# Patient Record
Sex: FEMALE | Race: WHITE | NOT HISPANIC OR LATINO | Employment: FULL TIME | URBAN - METROPOLITAN AREA
[De-identification: names, ages, dates, MRNs, and addresses within clinical notes are randomized per-mention and may not be internally consistent; named-entity substitution may affect disease eponyms.]

---

## 2017-07-20 ENCOUNTER — RADIANT APPOINTMENT (OUTPATIENT)
Dept: MAMMOGRAPHY | Facility: CLINIC | Age: 52
End: 2017-07-20
Attending: PHYSICIAN ASSISTANT
Payer: COMMERCIAL

## 2017-07-20 DIAGNOSIS — Z12.31 VISIT FOR SCREENING MAMMOGRAM: ICD-10-CM

## 2017-07-20 PROCEDURE — 77063 BREAST TOMOSYNTHESIS BI: CPT | Mod: TC

## 2017-07-20 PROCEDURE — G0202 SCR MAMMO BI INCL CAD: HCPCS | Mod: TC

## 2017-08-04 ENCOUNTER — HOSPITAL ENCOUNTER (OUTPATIENT)
Facility: CLINIC | Age: 52
Setting detail: SPECIMEN
Discharge: HOME | End: 2017-08-04
Attending: PHYSICIAN ASSISTANT | Admitting: PHYSICIAN ASSISTANT
Payer: COMMERCIAL

## 2017-08-04 ENCOUNTER — OFFICE VISIT (OUTPATIENT)
Dept: FAMILY MEDICINE | Facility: CLINIC | Age: 52
End: 2017-08-04
Payer: COMMERCIAL

## 2017-08-04 VITALS
WEIGHT: 216.4 LBS | HEIGHT: 63 IN | DIASTOLIC BLOOD PRESSURE: 74 MMHG | SYSTOLIC BLOOD PRESSURE: 109 MMHG | BODY MASS INDEX: 38.34 KG/M2 | TEMPERATURE: 99 F | OXYGEN SATURATION: 98 % | HEART RATE: 93 BPM

## 2017-08-04 DIAGNOSIS — Z11.51 SCREENING FOR HUMAN PAPILLOMAVIRUS: ICD-10-CM

## 2017-08-04 DIAGNOSIS — Z00.00 ENCOUNTER FOR ROUTINE ADULT HEALTH EXAMINATION WITHOUT ABNORMAL FINDINGS: Primary | ICD-10-CM

## 2017-08-04 DIAGNOSIS — Z83.49 FAMILY HISTORY OF HYPOTHYROIDISM: ICD-10-CM

## 2017-08-04 DIAGNOSIS — L29.9 ITCHING: ICD-10-CM

## 2017-08-04 DIAGNOSIS — Z11.59 ENCOUNTER FOR HEPATITIS C SCREENING TEST FOR LOW RISK PATIENT: ICD-10-CM

## 2017-08-04 DIAGNOSIS — Z12.4 SCREENING FOR MALIGNANT NEOPLASM OF CERVIX: ICD-10-CM

## 2017-08-04 LAB
ALBUMIN SERPL-MCNC: 4.1 G/DL (ref 3.4–5)
ALP SERPL-CCNC: 56 U/L (ref 40–150)
ALT SERPL W P-5'-P-CCNC: 31 U/L (ref 0–50)
ANION GAP SERPL CALCULATED.3IONS-SCNC: 10 MMOL/L (ref 3–14)
AST SERPL W P-5'-P-CCNC: 22 U/L (ref 0–45)
BASOPHILS # BLD AUTO: 0.1 10E9/L (ref 0–0.2)
BASOPHILS NFR BLD AUTO: 1.1 %
BILIRUB SERPL-MCNC: 0.3 MG/DL (ref 0.2–1.3)
BUN SERPL-MCNC: 23 MG/DL (ref 7–30)
CALCIUM SERPL-MCNC: 9.3 MG/DL (ref 8.5–10.1)
CHLORIDE SERPL-SCNC: 104 MMOL/L (ref 94–109)
CO2 SERPL-SCNC: 25 MMOL/L (ref 20–32)
CREAT SERPL-MCNC: 1 MG/DL (ref 0.52–1.04)
DIFFERENTIAL METHOD BLD: NORMAL
EOSINOPHIL # BLD AUTO: 0.2 10E9/L (ref 0–0.7)
EOSINOPHIL NFR BLD AUTO: 2.6 %
ERYTHROCYTE [DISTWIDTH] IN BLOOD BY AUTOMATED COUNT: 13.1 % (ref 10–15)
GFR SERPL CREATININE-BSD FRML MDRD: 58 ML/MIN/1.7M2
GLUCOSE SERPL-MCNC: 94 MG/DL (ref 70–99)
HCT VFR BLD AUTO: 42.7 % (ref 35–47)
HGB BLD-MCNC: 14.3 G/DL (ref 11.7–15.7)
LIPASE SERPL-CCNC: 225 U/L (ref 73–393)
LYMPHOCYTES # BLD AUTO: 2.5 10E9/L (ref 0.8–5.3)
LYMPHOCYTES NFR BLD AUTO: 30.9 %
MCH RBC QN AUTO: 31.3 PG (ref 26.5–33)
MCHC RBC AUTO-ENTMCNC: 33.5 G/DL (ref 31.5–36.5)
MCV RBC AUTO: 93 FL (ref 78–100)
MONOCYTES # BLD AUTO: 0.7 10E9/L (ref 0–1.3)
MONOCYTES NFR BLD AUTO: 9.1 %
NEUTROPHILS # BLD AUTO: 4.5 10E9/L (ref 1.6–8.3)
NEUTROPHILS NFR BLD AUTO: 56.3 %
PLATELET # BLD AUTO: 267 10E9/L (ref 150–450)
POTASSIUM SERPL-SCNC: 3.9 MMOL/L (ref 3.4–5.3)
PROT SERPL-MCNC: 7.8 G/DL (ref 6.8–8.8)
RBC # BLD AUTO: 4.57 10E12/L (ref 3.8–5.2)
SODIUM SERPL-SCNC: 139 MMOL/L (ref 133–144)
TSH SERPL DL<=0.005 MIU/L-ACNC: 2.07 MU/L (ref 0.4–4)
WBC # BLD AUTO: 7.9 10E9/L (ref 4–11)

## 2017-08-04 PROCEDURE — 87624 HPV HI-RISK TYP POOLED RSLT: CPT | Performed by: PHYSICIAN ASSISTANT

## 2017-08-04 PROCEDURE — G0145 SCR C/V CYTO,THINLAYER,RESCR: HCPCS | Performed by: PHYSICIAN ASSISTANT

## 2017-08-04 PROCEDURE — 99396 PREV VISIT EST AGE 40-64: CPT | Performed by: PHYSICIAN ASSISTANT

## 2017-08-04 PROCEDURE — 83690 ASSAY OF LIPASE: CPT | Performed by: PHYSICIAN ASSISTANT

## 2017-08-04 PROCEDURE — 99212 OFFICE O/P EST SF 10 MIN: CPT | Mod: 25 | Performed by: PHYSICIAN ASSISTANT

## 2017-08-04 PROCEDURE — 86803 HEPATITIS C AB TEST: CPT | Performed by: PHYSICIAN ASSISTANT

## 2017-08-04 PROCEDURE — 80050 GENERAL HEALTH PANEL: CPT | Performed by: PHYSICIAN ASSISTANT

## 2017-08-04 PROCEDURE — 82785 ASSAY OF IGE: CPT | Performed by: PHYSICIAN ASSISTANT

## 2017-08-04 PROCEDURE — 36415 COLL VENOUS BLD VENIPUNCTURE: CPT | Performed by: PHYSICIAN ASSISTANT

## 2017-08-04 NOTE — MR AVS SNAPSHOT
After Visit Summary   8/4/2017    Paulette Flores    MRN: [id number]           Patient Information     Date Of Birth          1965        Visit Information        Provider Department      8/4/2017 12:00 PM Laura Sarah PA-C [facility]        Today's Diagnoses     Screening for malignant neoplasm of cervix    -  1    Screening for human papillomavirus        Encounter for hepatitis C screening test for low risk patient        Family history of hypothyroidism        Itching          Care Instructions      Preventive Health Recommendations  Female Ages 50 - 64    Yearly exam: See your health care provider every year in order to  o Review health changes.   o Discuss preventive care.    o Review your medicines if your doctor has prescribed any.      Get a Pap test every three years (unless you have an abnormal result and your provider advises testing more often).    If you get Pap tests with HPV test, you only need to test every 5 years, unless you have an abnormal result.     You do not need a Pap test if your uterus was removed (hysterectomy) and you have not had cancer.    You should be tested each year for STDs (sexually transmitted diseases) if you're at risk.     Have a mammogram every 1 to 2 years.    Have a colonoscopy at age 50, or have a yearly FIT test (stool test). These exams screen for colon cancer.      Have a cholesterol test every 5 years, or more often if advised.    Have a diabetes test (fasting glucose) every three years. If you are at risk for diabetes, you should have this test more often.     If you are at risk for osteoporosis (brittle bone disease), think about having a bone density scan (DEXA).    Shots: Get a flu shot each year. Get a tetanus shot every 10 years.    Nutrition:     Eat at least 5 servings of fruits and vegetables each day.    Eat whole-grain bread, whole-wheat pasta and brown rice instead of white grains and rice.    Talk to your provider  "about Calcium and Vitamin D.     Lifestyle    Exercise at least 150 minutes a week (30 minutes a day, 5 days a week). This will help you control your weight and prevent disease.    Limit alcohol to one drink per day.    No smoking.     Wear sunscreen to prevent skin cancer.     See your dentist every six months for an exam and cleaning.    See your eye doctor every 1 to 2 years.            Follow-ups after your visit        Who to contact     Normal or non-critical lab and imaging results will be communicated to you by Roamzt, letter or phone within 4 business days after the clinic has received the results. If you do not hear from us within 7 days, please contact the clinic through Roamzt or phone. If you have a critical or abnormal lab result, we will notify you by phone as soon as possible.  Submit refill requests through uBeam or call your pharmacy and they will forward the refill request to us. Please allow 3 business days for your refill to be completed.          If you need to speak with a  for additional information , please call: [phone]             Additional Information About Your Visit        uBeam Information     uBeam lets you send messages to your doctor, view your test results, renew your prescriptions, schedule appointments and more. To sign up, go to [email or web address] . Click on \"Log in\" on the left side of the screen, which will take you to the Welcome page. Then click on \"Sign up Now\" on the right side of the page.     You will be asked to enter the access code listed below, as well as some personal information. Please follow the directions to create your username and password.     Your access code is: [email or web address]  Expires: 2017 12:28 PM     Your access code will  in 90 days. If you need help or a new code, please call your [place] clinic or [phone].        Care EveryWhere ID     This is your Care EveryWhere ID. This could be used by other " "organizations to access your [place] medical records  [id number]        Your Vitals Were     Pulse Temperature Height Pulse Oximetry BMI (Body Mass Index)       93 99  F (37.2  C) (Oral) 5' 3\" (1.6 m) 98% 38.33 kg/m2        Blood Pressure from Last 3 Encounters:   08/04/17 109/74   09/08/16 (!) 142/92   09/06/16 135/72    Weight from Last 3 Encounters:   08/04/17 216 lb 6.4 oz (98.2 kg)   09/08/16 190 lb (86.2 kg)   09/06/16 190 lb (86.2 kg)              We Performed the Following     CBC with platelets differential     Comprehensive metabolic panel     Hepatitis C Screen Reflex to HCV RNA Quant and Genotype     HPV High Risk Types DNA Cervical     IgE     Lipase     Pap imaged thin layer screen with HPV - recommended age 30 - 65 years (select HPV order below)     TSH with free T4 reflex        Primary Care Provider Office Phone # Fax #    Laura Sarah PA-C [phone]       [facility] [address] [facility] [address]        Equal Access to Services     [facility] : Hadii aad ku hadasho Soomaali, waaxda luqadaha, qaybta kaalmada adenorahyada, erma alvarez . So [facility] [phone].    ATENCIÓN: Si habla español, tiene a garcia disposición servicios gratuitos de asistencia lingüística. [facility] [phone].    We comply with applicable federal civil rights laws and [place] laws. We do not discriminate on the basis of race, color, national origin, age, disability sex, sexual orientation or gender identity.            Thank you!     Thank you for choosing [facility]  for your care. Our goal is always to provide you with excellent care. Hearing back from our patients is one way we can continue to improve our services. Please take a few minutes to complete the written survey that you may receive in the mail after your visit with us. Thank you!             Your Updated Medication List - Protect others around you: Learn how to safely use, store and " throw away your medicines at www.disposemymeds.org.      Notice  As of 8/4/2017 12:28 PM    You have not been prescribed any medications.

## 2017-08-04 NOTE — PROGRESS NOTES
SUBJECTIVE:   CC: Paulette Flores is an 52 year old woman who presents for preventive health visit.     Healthy Habits:    Do you get at least three servings of calcium containing foods daily (dairy, green leafy vegetables, etc.)? yes    Amount of exercise or daily activities, outside of work: daily     Problems taking medications regularly No    Medication side effects: No    Have you had an eye exam in the past two years? yes    Do you see a dentist twice per year? yes    Do you have sleep apnea, excessive snoring or daytime drowsiness?no      Other concerns:  Itching  All over   j2lliok off and on   No rash  Had this previously  Claritin has helped    Patient informed that anything we discuss that is not related to preventative medicine, may be billed for; patient verbalizes understanding.      Abuse: Current or Past(Physical, Sexual or Emotional)- No  Do you feel safe in your environment - Yes  Social History   Substance Use Topics     Smoking status: Current Some Day Smoker     Packs/day: 0.50     Years: 15.00     Types: Cigarettes     Smokeless tobacco: Never Used     Alcohol use No     The patient does not drink >3 drinks per day nor >7 drinks per week.    Reviewed orders with patient.  Reviewed health maintenance and updated orders accordingly - Yes  Labs reviewed in [facility]    Patient over age 50, mutual decision to screen reflected in health maintenance.      Pertinent mammograms are reviewed under the imaging tab.  History of abnormal Pap smear: NO - age 30- 65 PAP every 3 years recommended  Last 3 Pap Results: No results found for: PAP    Reviewed and updated as needed this visit by clinical staff  Allergies  Meds         Reviewed and updated as needed this visit by Provider        No past medical history on file.     ROS:  Other than what is noted in the HPI and PMH a complete review of systems is otherwise negative including: Constitutional, HEENT, endocrine, cardiovascular, respiratory, GI/,  "musculoskeletal, neuro, and psychiatric.     OBJECTIVE:   /74  Pulse 93  Temp 99  F (37.2  C) (Oral)  Ht 5' 3\" (1.6 m)  Wt 216 lb 6.4 oz (98.2 kg)  SpO2 98%  BMI 38.33 kg/m2  EXAM:  GENERAL: healthy, alert and no distress  EYES: Eyes grossly normal to inspection, PERRL and conjunctivae and sclerae normal  HENT: ear canals and TM's normal, nose and mouth without ulcers or lesions  NECK: no adenopathy, no asymmetry, masses, or scars and thyroid normal to palpation  RESP: lungs clear to auscultation - no rales, rhonchi or wheezes  BREAST: normal without masses, tenderness or nipple discharge and no palpable axillary masses or adenopathy  CV: regular rate and rhythm, normal S1 S2, no S3 or S4, no murmur, click or rub, no peripheral edema and peripheral pulses strong  ABDOMEN: soft, nontender, no hepatosplenomegaly, no masses and bowel sounds normal   (female): normal female external genitalia, normal urethral meatus, vaginal mucosa pink, moist, well rugated, and normal cervix  MS: no gross musculoskeletal defects noted, no edema  SKIN: no suspicious lesions or rashes  NEURO: Normal strength and tone, mentation intact and speech normal  PSYCH: mentation appears normal, affect normal/bright    ASSESSMENT/PLAN:       ICD-10-CM    1. Encounter for routine adult health examination without abnormal findings Z00.00    2. Screening for malignant neoplasm of cervix Z12.4 Pap imaged thin layer screen with HPV - recommended age 30 - 65 years (select HPV order below)   3. Screening for human papillomavirus Z11.51 HPV High Risk Types DNA Cervical   4. Encounter for hepatitis C screening test for low risk patient Z11.59 Hepatitis C Screen Reflex to HCV RNA Quant and Genotype   5. Family history of hypothyroidism Z83.49 TSH with free T4 reflex   6. Itching L29.9 CBC with platelets differential     Comprehensive metabolic panel     Lipase     IgE       Pap obtained today. Screening measures discussed.    Will obtain labs " "for her itching. No rash present today. Hepatic issue? Allergy? She will continue the antihistamine for now, could take this twice daily as well.      COUNSELING:   Reviewed preventive health counseling, as reflected in patient instructions       reports that she has been smoking Cigarettes.  She has a 7.50 pack-year smoking history. She has never used smokeless tobacco.    Estimated body mass index is 38.33 kg/(m^2) as calculated from the following:    Height as of this encounter: 5' 3\" (1.6 m).    Weight as of this encounter: 216 lb 6.4 oz (98.2 kg).       Counseling Resources:  ATP IV Guidelines  Pooled Cohorts Equation Calculator  Breast Cancer Risk Calculator  FRAX Risk Assessment  ICSI Preventive Guidelines  Dietary Guidelines for Americans, 2010  USDA's MyPlate  ASA Prophylaxis  Lung CA Screening    Laura Sarah PA-C  [facility] [place]  "

## 2017-08-04 NOTE — NURSING NOTE
"Chief Complaint   Patient presents with     Physical       Initial /74  Pulse 93  Temp 99  F (37.2  C) (Oral)  Ht 5' 3\" (1.6 m)  Wt 216 lb 6.4 oz (98.2 kg)  SpO2 98%  BMI 38.33 kg/m2 Estimated body mass index is 38.33 kg/(m^2) as calculated from the following:    Height as of this encounter: 5' 3\" (1.6 m).    Weight as of this encounter: 216 lb 6.4 oz (98.2 kg).  Medication Reconciliation: complete   Jennifer Carr MA      "

## 2017-08-07 LAB
HCV AB SERPL QL IA: NORMAL
IGE SERPL-ACNC: 6 KIU/L (ref 0–114)

## 2017-08-08 PROBLEM — R94.4 DECREASED GFR: Status: ACTIVE | Noted: 2017-08-08

## 2017-08-09 LAB
COPATH REPORT: NORMAL
PAP: NORMAL

## 2017-08-10 LAB
FINAL DIAGNOSIS: NORMAL
HPV HR 12 DNA CVX QL NAA+PROBE: NEGATIVE
HPV16 DNA SPEC QL NAA+PROBE: NEGATIVE
HPV18 DNA SPEC QL NAA+PROBE: NEGATIVE
SPECIMEN DESCRIPTION: NORMAL

## 2017-08-18 PROBLEM — Z12.4 CERVICAL CANCER SCREENING: Status: ACTIVE | Noted: 2017-08-18

## 2017-11-08 DIAGNOSIS — M25.579 PAIN IN JOINT, ANKLE AND FOOT, UNSPECIFIED LATERALITY: Primary | ICD-10-CM

## 2017-12-30 ENCOUNTER — TRANSFERRED RECORDS (OUTPATIENT)
Dept: HEALTH INFORMATION MANAGEMENT | Facility: CLINIC | Age: 52
End: 2017-12-30

## 2018-01-02 ENCOUNTER — TRANSFERRED RECORDS (OUTPATIENT)
Dept: HEALTH INFORMATION MANAGEMENT | Facility: CLINIC | Age: 53
End: 2018-01-02

## 2018-01-28 ENCOUNTER — OFFICE VISIT (OUTPATIENT)
Dept: URGENT CARE | Facility: URGENT CARE | Age: 53
End: 2018-01-28
Payer: COMMERCIAL

## 2018-01-28 VITALS
SYSTOLIC BLOOD PRESSURE: 127 MMHG | DIASTOLIC BLOOD PRESSURE: 77 MMHG | TEMPERATURE: 99.7 F | OXYGEN SATURATION: 97 % | HEART RATE: 97 BPM | WEIGHT: 217.5 LBS | BODY MASS INDEX: 38.53 KG/M2

## 2018-01-28 DIAGNOSIS — R05.9 COUGH: ICD-10-CM

## 2018-01-28 DIAGNOSIS — J01.90 ACUTE SINUSITIS WITH SYMPTOMS > 10 DAYS: Primary | ICD-10-CM

## 2018-01-28 PROCEDURE — 99214 OFFICE O/P EST MOD 30 MIN: CPT | Performed by: PHYSICIAN ASSISTANT

## 2018-01-28 RX ORDER — BENZONATATE 100 MG/1
100 CAPSULE ORAL 3 TIMES DAILY PRN
Qty: 18 CAPSULE | Refills: 0 | Status: SHIPPED | OUTPATIENT
Start: 2018-01-28 | End: 2018-02-14

## 2018-01-28 RX ORDER — FLUTICASONE PROPIONATE 50 MCG
2 SPRAY, SUSPENSION (ML) NASAL DAILY
Qty: 1 BOTTLE | Refills: 0 | Status: SHIPPED | OUTPATIENT
Start: 2018-01-28 | End: 2018-02-14

## 2018-01-28 RX ORDER — DOXYCYCLINE 100 MG/1
100 TABLET ORAL 2 TIMES DAILY
Qty: 20 TABLET | Refills: 0 | Status: SHIPPED | OUTPATIENT
Start: 2018-01-28 | End: 2018-02-07

## 2018-01-28 NOTE — PROGRESS NOTES
ASSESSMENT/PLAN:    ICD-10-CM    1. Acute sinusitis with symptoms > 10 days J01.90 doxycycline Monohydrate 100 MG TABS     fluticasone (FLONASE) 50 MCG/ACT spray   2. Cough R05 benzonatate (TESSALON) 100 MG capsule           Medical Decision Making:    Differential Diagnosis: recurrent sinusitis, sinusitis failing prior treatment, acute bronchitis, viral URI    Serious Comorbid Conditions: none    Symptoms consistent with bacterial sinusitis failing prior treatment with Augmentin at beginning of January. No signs of pneumonia or bronchitis on lung exam. Highly suspect cough is due to postnasal drip from sinusitis. We will trial treatment with doxycycline today. Also prescribed Flonase and recommended Mucinex BID.    At the end of the encounter, I discussed all available results, as well as the diagnosis and any associated medications. I discussed red flags for immediate return to clinic/ER, as well as indications for follow up. Refer to patient instructions below, which were all addressed with patient. Patient understood and agreed to plan. Patient was appropriate for discharge.      Patient Instructions   Sinusitis  You have a sinus infection.    Take Doxycycline twice daily with food x 10 days.    Take a probiotic while taking the antibiotic.    Take Mucinex twice daily. Use sinus rinses or Neti-pot, steamy showers, humidifiers.    Try Flonase 2 sprays in each nostril nightly at bedtime.    Take ibuprofen or Tylenol for sinus pain.    May take Tessalon Perles every 8 hours for cough.    Follow up with primary care provider if no improvement in 2 weeks, sooner if worsening or developing any new symptoms.                                    SUBJECTIVE:   Paulette Flores is a 52 year old female presenting with a chief complaint of   Chief Complaint   Patient presents with     Sick     cough, sinus pain and pressure x since end of december. was given augmentin January 2nd.      She started having sinus congestion 1  month ago. She was seen at [facility] and was sent home with supportive cares. She had a course of Augmentin beginning Jan 2. Symptoms never completely resolved and she continued to have post-nasal drip.  Then she developed a cough a week ago. She was seen at [facility] and again was prescribed supportive cares.  She describes cough as dry. It is worse when laying down. She does still have sinus pressure and left ear pain. She denies sore throat, fever. She does have chills, fatigue. No shortness of breath, chest pain, hemoptysis.   Many ill contacts with flu, etc.  She has been using Mucinex without any improvement.    ROS  See HPI, otherwise negative    No past medical history on file.  Current Outpatient Prescriptions   Medication Sig Dispense Refill     doxycycline Monohydrate 100 MG TABS Take 100 mg by mouth 2 times daily for 10 days 20 tablet 0     fluticasone (FLONASE) 50 MCG/ACT spray Spray 2 sprays into both nostrils daily 1 Bottle 0     benzonatate (TESSALON) 100 MG capsule Take 1 capsule (100 mg) by mouth 3 times daily as needed for cough 18 capsule 0     Social History   Substance Use Topics     Smoking status: Current Some Day Smoker     Packs/day: 0.50     Years: 15.00     Types: Cigarettes     Smokeless tobacco: Never Used     Alcohol use No        Smoking: yes  Travel: none      OBJECTIVE  /77  Pulse 97  Temp 99.7  F (37.6  C) (Tympanic)  Wt 217 lb 8 oz (98.7 kg)  SpO2 97%  BMI 38.53 kg/m2    General: alert, appears comfortable seated, NAD. Afebrile.  Skin: no suspicious lesions or rashes.  HEENT: Normocephalic.   Eyes: conjunctiva clear.   Ears: TMs pearly, translucent bilaterally.   Nose: No polyps seen. Mild erythema and edema of nasal mucosa. No rhinorrhea. ++ left maxillary sinus TTP.  Oropharynx: MMM.  + posterior pharyngeal erythema. + uvular erythema and 1 papule, without ulceration and no uvular edema. No tonsillar hypertrophy. Uvula midline.    Neck: supple, no  lymphadenopathy.  Respiratory: No distress. Equal inspiration to bilateral bases. No crackles wheeze, rhonchi, rales.   Cardiovascular: RRR. No murmurs, clicks, gallups, or rub.

## 2018-01-28 NOTE — MR AVS SNAPSHOT
After Visit Summary   1/28/2018    Paulette Flores    MRN: [id number]           Patient Information     Date Of Birth          1965        Visit Information        Provider Department      1/28/2018 9:05 AM Arleen Parra PA-C [facility]        Today's Diagnoses     Acute sinusitis with symptoms > 10 days    -  1    Cough          Care Instructions    Sinusitis  You have a sinus infection.    Take Doxycycline twice daily with food x 10 days.    Take a probiotic while taking the antibiotic.    Take Mucinex twice daily. Use sinus rinses or Neti-pot, steamy showers, humidifiers.    Try Flonase 2 sprays in each nostril nightly at bedtime.    Take ibuprofen or Tylenol for sinus pain.    May take Tessalon Perles every 8 hours for cough.    Follow up with primary care provider if no improvement in 2 weeks, sooner if worsening or developing any new symptoms.                Follow-ups after your visit        Follow-up notes from your care team     Return if symptoms worsen or fail to improve.      Who to contact     If you have questions or need follow up information about today's clinic visit or your schedule please contact [facility] directly at [phone].  Normal or non-critical lab and imaging results will be communicated to you by Transceptahart, letter or phone within 4 business days after the clinic has received the results. If you do not hear from us within 7 days, please contact the clinic through Transceptahart or phone. If you have a critical or abnormal lab result, we will notify you by phone as soon as possible.  Submit refill requests through Roomish or call your pharmacy and they will forward the refill request to us. Please allow 3 business days for your refill to be completed.          Additional Information About Your Visit        MyChart Information     Roomish gives you secure access to your electronic health record. If you see a primary care provider, you can  also send messages to your care team and make appointments. If you have questions, please call your primary care clinic.  If you do not have a primary care provider, please call [phone] and they will assist you.        Care EveryWhere ID     This is your Care EveryWhere ID. This could be used by other organizations to access your [place] medical records  [id number]        Your Vitals Were     Pulse Temperature Pulse Oximetry BMI (Body Mass Index)          97 99.7  F (37.6  C) (Tympanic) 97% 38.53 kg/m2         Blood Pressure from Last 3 Encounters:   01/28/18 127/77   08/04/17 109/74   09/08/16 (!) 142/92    Weight from Last 3 Encounters:   01/28/18 217 lb 8 oz (98.7 kg)   08/04/17 216 lb 6.4 oz (98.2 kg)   09/08/16 190 lb (86.2 kg)              Today, you had the following     No orders found for display         Today's Medication Changes          These changes are accurate as of 1/28/18  9:49 AM.  If you have any questions, ask your nurse or doctor.               Start taking these medicines.        Dose/Directions    benzonatate 100 MG capsule   Commonly known as:  TESSALON   Used for:  Cough   Started by:  Arleen Parra PA-C        Dose:  100 mg   Take 1 capsule (100 mg) by mouth 3 times daily as needed for cough   Quantity:  18 capsule   Refills:  0       doxycycline Monohydrate 100 MG Tabs   Used for:  Acute sinusitis with symptoms > 10 days   Started by:  Arleen Parra PA-C        Dose:  100 mg   Take 100 mg by mouth 2 times daily for 10 days   Quantity:  20 tablet   Refills:  0       fluticasone 50 MCG/ACT spray   Commonly known as:  FLONASE   Used for:  Acute sinusitis with symptoms > 10 days   Started by:  Arleen Parra PA-C        Dose:  2 spray   Spray 2 sprays into both nostrils daily   Quantity:  1 Bottle   Refills:  0            Where to get your medicines      These medications were sent to [facility]/pharmacy #[id number] - [place], [place] - [address] [place] [address] AT INTERSECTION  [address]  [address] [place] [address] [place], [place] [address]     Phone:  [phone]     benzonatate 100 MG capsule    doxycycline Monohydrate 100 MG Tabs    fluticasone 50 MCG/ACT spray                Primary Care Provider Office Phone # Fax #    Laura Sarah PA-C [phone]       [address]  [place] [address]        Equal Access to Services     [facility]: Hadii aad ku hadasho Soomaali, waaxda luqadaha, qaybta kaalmada adeegyada, waxay idiin hayaan adeeg kharash la'aan . So [facility] [phone].    ATENCIÓN: Si sharila espjohanne, tiene a garcia disposición servicios gratuitos de asistencia lingüística. [facility] [phone].    We comply with applicable federal civil rights laws and [place] laws. We do not discriminate on the basis of race, color, national origin, age, disability, sex, sexual orientation, or gender identity.            Thank you!     Thank you for [facility]  [facility]. Our goal is always to provide you with excellent care. Hearing back from our patients is one way we can continue to improve our services. Please take a few minutes to complete the written survey that you may receive in the mail after your visit with us. Thank you!             Your Updated Medication List - Protect others around you: Learn how to safely use, store and throw away your medicines at www.disposemymeds.org.          This list is accurate as of 1/28/18  9:49 AM.  Always use your most recent med list.                   Brand Name Dispense Instructions for use Diagnosis    benzonatate 100 MG capsule    TESSALON    18 capsule    Take 1 capsule (100 mg) by mouth 3 times daily as needed for cough    Cough       doxycycline Monohydrate 100 MG Tabs     20 tablet    Take 100 mg by mouth 2 times daily for 10 days    Acute sinusitis with symptoms > 10 days       fluticasone 50 MCG/ACT spray    FLONASE    1 Bottle    Spray 2 sprays into both nostrils daily    Acute sinusitis with  symptoms > 10 days

## 2018-01-28 NOTE — PATIENT INSTRUCTIONS
Sinusitis  You have a sinus infection.    Take Doxycycline twice daily with food x 10 days.    Take a probiotic while taking the antibiotic.    Take Mucinex twice daily. Use sinus rinses or Neti-pot, steamy showers, humidifiers.    Try Flonase 2 sprays in each nostril nightly at bedtime.    Take ibuprofen or Tylenol for sinus pain.    May take Tessalon Perles every 8 hours for cough.    Follow up with primary care provider if no improvement in 2 weeks, sooner if worsening or developing any new symptoms.

## 2018-01-30 ENCOUNTER — TELEPHONE (OUTPATIENT)
Dept: FAMILY MEDICINE | Facility: CLINIC | Age: 53
End: 2018-01-30

## 2018-01-30 NOTE — TELEPHONE ENCOUNTER
Spoke with patient she was started on Doxycyline by  on 1/28/18. (augmentin previously)  Rash started on Monday, redness and a little puffiness in left arm pit, no itch but area is sore. No fever  No change in size of rash with continued use of abx.  She has had 3 episodes of diarrhea today, stool was loose yesterday. Tinny taste in mouth  Does eat yogurt daily.  Patient asking if should stop meds? Try a different med?  She had been given Augmentin previous by [facility],   She is not sure if she should keep using the Flonase either?  Patient not sure what are the next steps she needs to be taking?  Ruth Gong RN

## 2018-01-30 NOTE — TELEPHONE ENCOUNTER
Sxs in her axilla sound more consistent with an early boil or infection. Doesn't sounds like an allergic reaction.  If she can tolerate the doxycycline I'd continue it for at least 7 days. A probiotic may help with GI symptoms. If not, we could switch to Augmentin.  Flonase best for chronic sxs rather than acute symptoms such as sinusitis. May not be of benefit for an acute sinus infection

## 2018-01-30 NOTE — TELEPHONE ENCOUNTER
Patient states she was put on doxycylciine and developed a rash and diarrhea and wonders if it is from that and if she should keep taking it.  Please call.    Thank you.

## 2018-01-30 NOTE — TELEPHONE ENCOUNTER
Patient notified and voiced understanding and agreement.  She will continue the doxycycline for now.  She knows to call if any questions or concerns.  Ruth Gong RN

## 2018-02-06 NOTE — PROGRESS NOTES
"  SUBJECTIVE:  Paulette Flores is a 52 year old female who presents with the following concerns;              Symptoms: cc Present Absent Comment   Fever/Chills   x    Fatigue  x  intermittent   Muscle Aches  x  Chest sore from coughing   Eye Irritation   x    Sneezing   x    Nasal Tony/Drg   x    Sinus Pressure/Pain  x     Loss of smell   x    Dental pain   x    Sore Throat   x    Swollen Glands   x    Ear Pain/Fullness  x  fullness   Cough  x     Wheeze   x    Chest Pain  x  tightness   Shortness of breath   x    Rash   x    Other         Symptom duration:  x2 months   Sympom severity:  moderate   Treatments tried:  Augmentin, benzonatate, mucinex, sudafed, doxycycline    Contacts:  none       Medications updated and reviewed.  Past, family and surgical history is updated and reviewed in the record.    ROS:  Other than noted above, general, HEENT, respiratory, cardiac and gastrointestinal systems are negative.    OBJECTIVE:  /57  Pulse 83  Temp 98.6  F (37  C) (Oral)  Ht 5' 3\" (1.6 m)  Wt 218 lb (98.9 kg)  SpO2 98%  BMI 38.62 kg/m2  GENERAL: Pleasant and interactive. No acute distress.  HEENT: Mild injection of conjunctiva. TMs clear.  Oropharynx moist and clear.   NECK: mild-mod TTP over the left mastoid  SKIN:  Only benign skin findings. No unusual rashes or suspicious skin lesions noted. Nails appear normal.    Assessment:    Encounter Diagnoses   Name Primary?     Chronic sinusitis, unspecified location Yes     Mastoiditis of left side      Plan:   Orders Placed This Encounter     CT Maxillofacial w/o Contrast     levofloxacin (LEVAQUIN) 750 MG tablet       Will start levofloxacin. She has an appt with ENT in 1 week. If still symptomatic by Monday she'll have a follow up sinus CT as well.    Supportive therapy also discussed. Follow up if symptoms fail to improve or worsen.      The patient was in agreement with the plan today and had no questions or concerns prior to leaving the clinic.     Laura " Hermelinda Sarah PA-C

## 2018-02-07 ENCOUNTER — OFFICE VISIT (OUTPATIENT)
Dept: FAMILY MEDICINE | Facility: CLINIC | Age: 53
End: 2018-02-07
Payer: COMMERCIAL

## 2018-02-07 VITALS
WEIGHT: 218 LBS | TEMPERATURE: 98.6 F | HEART RATE: 83 BPM | HEIGHT: 63 IN | DIASTOLIC BLOOD PRESSURE: 57 MMHG | OXYGEN SATURATION: 98 % | SYSTOLIC BLOOD PRESSURE: 116 MMHG | BODY MASS INDEX: 38.62 KG/M2

## 2018-02-07 DIAGNOSIS — H70.92 MASTOIDITIS OF LEFT SIDE: ICD-10-CM

## 2018-02-07 DIAGNOSIS — J32.9 CHRONIC SINUSITIS, UNSPECIFIED LOCATION: Primary | ICD-10-CM

## 2018-02-07 PROCEDURE — 99213 OFFICE O/P EST LOW 20 MIN: CPT | Performed by: PHYSICIAN ASSISTANT

## 2018-02-07 RX ORDER — LEVOFLOXACIN 750 MG/1
750 TABLET, FILM COATED ORAL DAILY
Qty: 10 TABLET | Refills: 0 | Status: SHIPPED | OUTPATIENT
Start: 2018-02-07 | End: 2018-02-17

## 2018-02-07 NOTE — MR AVS SNAPSHOT
After Visit Summary   2/7/2018    Pauletet Flores    MRN: [id number]           Patient Information     Date Of Birth          1965        Visit Information        Provider Department      2/7/2018 8:20 AM Laura Sarah PA-C [facility]        Today's Diagnoses     Chronic sinusitis, unspecified location    -  1       Follow-ups after your visit        Your next 10 appointments already scheduled     Feb 14, 2018  1:00 PM CST   Return Visit with Cynthia [facility]erjee MD   [facility]    [address] [address]   [phone]              Future tests that were ordered for you today     Open Future Orders        Priority Expected Expires Ordered    CT Maxillofacial w/o Contrast Routine 2/12/2018 2/7/2019 2/7/2018            Who to contact     Normal or non-critical lab and imaging results will be communicated to you by [employer], letter or phone within 4 business days after the clinic has received the results. If you do not hear from us within 7 days, please contact the clinic through [employer] or phone. If you have a critical or abnormal lab result, we will notify you by phone as soon as possible.  Submit refill requests through [employer] or call your pharmacy and they will forward the refill request to us. Please allow 3 business days for your refill to be completed.          If you need to speak with a  for additional information , please call: [phone]             Additional Information About Your Visit        [employer] Information     [employer] gives you secure access to your electronic health record. If you see a primary care provider, you can also send messages to your care team and make appointments. If you have questions, please call your primary care clinic.  If you do not have a primary care provider, please call [phone] and they will assist you.        Care EveryWhere ID     This is your Care  "EveryWhere ID. This could be used by other organizations to access your [facility] medical records  [id number]        Your Vitals Were     Pulse Temperature Height Pulse Oximetry BMI (Body Mass Index)       83 98.6  F (37  C) (Oral) 5' 3\" (1.6 m) 98% 38.62 kg/m2        Blood Pressure from Last 3 Encounters:   02/07/18 116/57   01/28/18 127/77   08/04/17 109/74    Weight from Last 3 Encounters:   02/07/18 218 lb (98.9 kg)   01/28/18 217 lb 8 oz (98.7 kg)   08/04/17 216 lb 6.4 oz (98.2 kg)                 Today's Medication Changes          These changes are accurate as of 2/7/18  8:47 AM.  If you have any questions, ask your nurse or doctor.               Start taking these medicines.        Dose/Directions    levofloxacin 750 MG tablet   Commonly known as:  LEVAQUIN   Used for:  Chronic sinusitis, unspecified location   Started by:  Laura Sarah PA-C        Dose:  750 mg   Take 1 tablet (750 mg) by mouth daily for 10 days   Quantity:  10 tablet   Refills:  0            Where to get your medicines      These medications were sent to [facility] [place] [address] [facility] [address] [facility] [place]  [address] [address]     Phone:  [phone]     levofloxacin 750 MG tablet                Primary Care Provider Office Phone # Fax #    Laura Sarah PA-C [phone] [address]  [place] [address]        Equal Access to Services     [facility]: Hadii bessy mccullough hadasho Soshoali, waaxda luqadaha, qaybta kaalmada adeegyada, erma hanna. So [facility] [phone].    ATENCIÓN: Si habla español, tiene a garcia disposición servicios gratuitos de asistencia lingüística. Llame al [phone].    We comply with applicable federal civil rights laws and [place] laws. We do not discriminate on the basis of race, color, national origin, age, disability, sex, sexual orientation, or gender identity.            Thank you!     Thank you for choosing " [facility] [place]  for your care. Our goal is always to provide you with excellent care. Hearing back from our patients is one way we can continue to improve our services. Please take a few minutes to complete the written survey that you may receive in the mail after your visit with us. Thank you!             Your Updated Medication List - Protect others around you: Learn how to safely use, store and throw away your medicines at www.disposemymeds.org.          This list is accurate as of 2/7/18  8:47 AM.  Always use your most recent med list.                   Brand Name Dispense Instructions for use Diagnosis    benzonatate 100 MG capsule    TESSALON    18 capsule    Take 1 capsule (100 mg) by mouth 3 times daily as needed for cough    Cough       doxycycline Monohydrate 100 MG Tabs     20 tablet    Take 100 mg by mouth 2 times daily for 10 days    Acute sinusitis with symptoms > 10 days       fluticasone 50 MCG/ACT spray    FLONASE    1 Bottle    Spray 2 sprays into both nostrils daily    Acute sinusitis with symptoms > 10 days       levofloxacin 750 MG tablet    LEVAQUIN    10 tablet    Take 1 tablet (750 mg) by mouth daily for 10 days    Chronic sinusitis, unspecified location

## 2018-02-12 ENCOUNTER — RADIANT APPOINTMENT (OUTPATIENT)
Dept: CT IMAGING | Facility: CLINIC | Age: 53
End: 2018-02-12
Attending: PHYSICIAN ASSISTANT
Payer: COMMERCIAL

## 2018-02-12 DIAGNOSIS — J32.9 CHRONIC SINUSITIS, UNSPECIFIED LOCATION: ICD-10-CM

## 2018-02-12 PROCEDURE — 70486 CT MAXILLOFACIAL W/O DYE: CPT | Mod: TC

## 2018-02-14 ENCOUNTER — OFFICE VISIT (OUTPATIENT)
Dept: OTOLARYNGOLOGY | Facility: CLINIC | Age: 53
End: 2018-02-14
Payer: COMMERCIAL

## 2018-02-14 VITALS
HEART RATE: 76 BPM | SYSTOLIC BLOOD PRESSURE: 111 MMHG | BODY MASS INDEX: 38.62 KG/M2 | DIASTOLIC BLOOD PRESSURE: 66 MMHG | WEIGHT: 218 LBS | TEMPERATURE: 97.7 F

## 2018-02-14 DIAGNOSIS — J32.0 CHRONIC MAXILLARY SINUSITIS: Primary | ICD-10-CM

## 2018-02-14 DIAGNOSIS — H69.92 DYSFUNCTION OF LEFT EUSTACHIAN TUBE: ICD-10-CM

## 2018-02-14 PROCEDURE — 99213 OFFICE O/P EST LOW 20 MIN: CPT | Performed by: OTOLARYNGOLOGY

## 2018-02-14 ASSESSMENT — PAIN SCALES - GENERAL: PAINLEVEL: NO PAIN (0)

## 2018-02-14 NOTE — NURSING NOTE
"Initial /66 (BP Location: Right arm, Patient Position: Chair, Cuff Size: Adult Large)  Pulse 76  Temp 97.7  F (36.5  C) (Oral)  Wt 98.9 kg (218 lb)  BMI 38.62 kg/m2 Estimated body mass index is 38.62 kg/(m^2) as calculated from the following:    Height as of 2/7/18: 1.6 m (5' 3\").    Weight as of this encounter: 98.9 kg (218 lb). .    Asuncion Bello LPN    "

## 2018-02-14 NOTE — LETTER
2/14/2018         RE: Paulette Flores  [address]  [place] [address]        Dear Colleague,    Thank you for referring your patient, Paulette Flores, to the [facility]. Please see a copy of my visit note below.    History of Present Illness - Paulette Flores is a 52 year old female I have seen in the past in follow-up of her left ear.  She had multiple otologic surgeries on the left by Dr. Mendoza.  Subsequently, her hearing has been poor.    Recently, she had a sinus infection.  She was recently treated with Levaquin and underwent a CT scan of the sinuses.  She does have a history of sinus surgery by Dr. Eisenberg several years ago.  She currently feels that her sinus symptoms are much improved.  She did have a similar episode about 6 weeks ago but did have an interval in between these 2 episodes where she felt healthy.  Today, she denies any sinus symptoms.  She does feel that her left ear is rather full and heavy.    Past Medical History -   Patient Active Problem List   Diagnosis     Conductive hearing loss in left ear     DDD (degenerative disc disease), lumbar     Decreased GFR     Cervical cancer screening       Current Medications -   Current Outpatient Prescriptions:      levofloxacin (LEVAQUIN) 750 MG tablet, Take 1 tablet (750 mg) by mouth daily for 10 days, Disp: 10 tablet, Rfl: 0    Allergies -   Allergies   Allergen Reactions     Percocet [Oxycodone-Acetaminophen] Difficulty breathing     Throat closed     Vicodin [Hydrocodone-Acetaminophen] Nausea and Vomiting       Social History -   Social History     Social History     Marital status:      Spouse name: N/A     Number of children: N/A     Years of education: N/A     Social History Main Topics     Smoking status: Current Some Day Smoker     Packs/day: 0.10     Years: 15.00     Types: Cigarettes     Smokeless tobacco: Never Used     Alcohol use No     Drug use: No     Sexual activity: Not Asked     Other Topics  Concern     None     Social History Narrative       Family History -   Family History   Problem Relation Age of Onset     Hypertension Mother      Thyroid Disease Mother      Hypertension Father      Thyroid Disease Sister        Review of Systems - As per HPI and PMHx, otherwise 7 system review of the head and neck negative. 10+ system review negative.    Exam:  /66 (BP Location: Right arm, Patient Position: Chair, Cuff Size: Adult Large)  Pulse 76  Temp 97.7  F (36.5  C) (Oral)  Wt 98.9 kg (218 lb)  BMI 38.62 kg/m2  General - The patient is well nourished and well developed, and appears to have good nutritional status.  Alert and oriented to person and place, answers questions and cooperates with examination appropriately.   Head and Face - Normocephalic and atraumatic, with no gross asymmetry noted of the contour of the facial features.  The facial nerve is intact, with strong symmetric movements.  Eyes - Extraocular movements intact.  Sclera were not icteric or injected, conjunctiva were pink and moist.  Ear- examination of the left ear demonstrates left middle ear effusion.  She is able to auto insufflate her ear which elevates tympanic membrane away from the incudostapedial joint.  She felt that her ear symptoms improved following this procedure.    Nose-nasal airway is patent bilaterally.  No thick mucus.  No polyps.    CT scan of sinus performed February 12, 2018 demonstrates pansinusitis with mucosal thickening but widely patent maxillary sinuses.  There is some evidence of left middle ear effusion.      A/P - Paulette Flores is a 52 year old female with improved symptoms of sinusitis.  Given her improvement and the lack of significant evidence of surgical disease on her CT of the sinuses, I recommended against pursuing revision sinus surgery.  She did have some worsening eustachian tube dysfunction on the left.  This was helped by auto insufflation.  Recommended she continue to perform this as  needed.  She will follow-up with me in approximately 9 months to continue to follow her ear.  She should return sooner if she has recurrent symptoms.  She was also advised to return promptly if there is any decrease in hearing on her right ear as this is her best hearing ear.      Dr. Cynthia Banerjee MD  Otolaryngology  [facility]      Again, thank you for allowing me to participate in the care of your patient.        Sincerely,        Cynthia Banerjee MD

## 2018-02-14 NOTE — PROGRESS NOTES
History of Present Illness - Paulette Flores is a 52 year old female I have seen in the past in follow-up of her left ear.  She had multiple otologic surgeries on the left by Dr. Mendoza.  Subsequently, her hearing has been poor.    Recently, she had a sinus infection.  She was recently treated with Levaquin and underwent a CT scan of the sinuses.  She does have a history of sinus surgery by Dr. Eisenberg several years ago.  She currently feels that her sinus symptoms are much improved.  She did have a similar episode about 6 weeks ago but did have an interval in between these 2 episodes where she felt healthy.  Today, she denies any sinus symptoms.  She does feel that her left ear is rather full and heavy.    Past Medical History -   Patient Active Problem List   Diagnosis     Conductive hearing loss in left ear     DDD (degenerative disc disease), lumbar     Decreased GFR     Cervical cancer screening       Current Medications -   Current Outpatient Prescriptions:      levofloxacin (LEVAQUIN) 750 MG tablet, Take 1 tablet (750 mg) by mouth daily for 10 days, Disp: 10 tablet, Rfl: 0    Allergies -   Allergies   Allergen Reactions     Percocet [Oxycodone-Acetaminophen] Difficulty breathing     Throat closed     Vicodin [Hydrocodone-Acetaminophen] Nausea and Vomiting       Social History -   Social History     Social History     Marital status:      Spouse name: N/A     Number of children: N/A     Years of education: N/A     Social History Main Topics     Smoking status: Current Some Day Smoker     Packs/day: 0.10     Years: 15.00     Types: Cigarettes     Smokeless tobacco: Never Used     Alcohol use No     Drug use: No     Sexual activity: Not Asked     Other Topics Concern     None     Social History Narrative       Family History -   Family History   Problem Relation Age of Onset     Hypertension Mother      Thyroid Disease Mother      Hypertension Father      Thyroid Disease Sister        Review of Systems  - As per HPI and PMHx, otherwise 7 system review of the head and neck negative. 10+ system review negative.    Exam:  /66 (BP Location: Right arm, Patient Position: Chair, Cuff Size: Adult Large)  Pulse 76  Temp 97.7  F (36.5  C) (Oral)  Wt 98.9 kg (218 lb)  BMI 38.62 kg/m2  General - The patient is well nourished and well developed, and appears to have good nutritional status.  Alert and oriented to person and place, answers questions and cooperates with examination appropriately.   Head and Face - Normocephalic and atraumatic, with no gross asymmetry noted of the contour of the facial features.  The facial nerve is intact, with strong symmetric movements.  Eyes - Extraocular movements intact.  Sclera were not icteric or injected, conjunctiva were pink and moist.  Ear- examination of the left ear demonstrates left middle ear effusion.  She is able to auto insufflate her ear which elevates tympanic membrane away from the incudostapedial joint.  She felt that her ear symptoms improved following this procedure.    Nose-nasal airway is patent bilaterally.  No thick mucus.  No polyps.    CT scan of sinus performed February 12, 2018 demonstrates pansinusitis with mucosal thickening but widely patent maxillary sinuses.  There is some evidence of left middle ear effusion.      A/P - Paulette Flores is a 52 year old female with improved symptoms of sinusitis.  Given her improvement and the lack of significant evidence of surgical disease on her CT of the sinuses, I recommended against pursuing revision sinus surgery.  She did have some worsening eustachian tube dysfunction on the left.  This was helped by auto insufflation.  Recommended she continue to perform this as needed.  She will follow-up with me in approximately 9 months to continue to follow her ear.  She should return sooner if she has recurrent symptoms.  She was also advised to return promptly if there is any decrease in hearing on her right ear as  this is her best hearing ear.      Dr. Cynthia Banerjee MD  Otolaryngology  [facility]

## 2018-02-14 NOTE — MR AVS SNAPSHOT
After Visit Summary   2/14/2018    Paulette Flores    MRN: [id number]           Patient Information     Date Of Birth          1965        Visit Information        Provider Department      2/14/2018 1:00 PM Cynthia Banerjee MD [facility]        Today's Diagnoses     Chronic maxillary sinusitis    -  1    Dysfunction of left eustachian tube          Care Instructions    Per physician's instructions            Follow-ups after your visit        Who to contact     If you have questions or need follow up information about today's clinic visit or your schedule please contact [facility] directly at [phone].  Normal or non-critical lab and imaging results will be communicated to you by MyChart, letter or phone within 4 business days after the clinic has received the results. If you do not hear from us within 7 days, please contact the clinic through [employer] or phone. If you have a critical or abnormal lab result, we will notify you by phone as soon as possible.  Submit refill requests through [employer] or call your pharmacy and they will forward the refill request to us. Please allow 3 business days for your refill to be completed.          Additional Information About Your Visit        MyChart Information     [employer] gives you secure access to your electronic health record. If you see a primary care provider, you can also send messages to your care team and make appointments. If you have questions, please call your primary care clinic.  If you do not have a primary care provider, please call [phone] and they will assist you.        Care EveryWhere ID     This is your Care EveryWhere ID. This could be used by other organizations to access your [place] medical records  [id number]        Your Vitals Were     Pulse Temperature BMI (Body Mass Index)             76 97.7  F (36.5  C) (Oral) 38.62 kg/m2          Blood Pressure from Last 3 Encounters:   02/14/18 111/66    02/07/18 116/57   01/28/18 127/77    Weight from Last 3 Encounters:   02/14/18 98.9 kg (218 lb)   02/07/18 98.9 kg (218 lb)   01/28/18 98.7 kg (217 lb 8 oz)              Today, you had the following     No orders found for display       Primary Care Provider Office Phone # Fax #    Laura Sarah PA-C [phone]       [address] [place] [address]        Equal Access to Services     [facility]: Hadii aad ku hadasho Soomaali, waaxda luqadaha, qaybta kaalmada adeegyada, waxay karishma hayteri alvarez . So [facility] [phone].    ATENCIÓN: Si habla español, tiene a garcia disposición servicios gratuitos de asistencia lingüística. [facility] [phone].    We comply with applicable federal civil rights laws and [place] laws. We do not discriminate on the basis of race, color, national origin, age, disability, sex, sexual orientation, or gender identity.            Thank you!     Thank you for choosing [facility]  for your care. Our goal is always to provide you with excellent care. Hearing back from our patients is one way we can continue to improve our services. Please take a few minutes to complete the written survey that you may receive in the mail after your visit with us. Thank you!             Your Updated Medication List - Protect others around you: Learn how to safely use, store and throw away your medicines at www.disposemymeds.org.          This list is accurate as of 2/14/18  4:59 PM.  Always use your most recent med list.                   Brand Name Dispense Instructions for use Diagnosis    levofloxacin 750 MG tablet    LEVAQUIN    10 tablet    Take 1 tablet (750 mg) by mouth daily for 10 days    Chronic sinusitis, unspecified location

## 2018-05-22 ENCOUNTER — OFFICE VISIT (OUTPATIENT)
Dept: OTOLARYNGOLOGY | Facility: CLINIC | Age: 53
End: 2018-05-22
Payer: COMMERCIAL

## 2018-05-22 VITALS
HEART RATE: 84 BPM | DIASTOLIC BLOOD PRESSURE: 82 MMHG | TEMPERATURE: 99.4 F | HEIGHT: 63 IN | BODY MASS INDEX: 38.45 KG/M2 | SYSTOLIC BLOOD PRESSURE: 142 MMHG | WEIGHT: 217 LBS

## 2018-05-22 DIAGNOSIS — J01.01 ACUTE RECURRENT MAXILLARY SINUSITIS: Primary | ICD-10-CM

## 2018-05-22 PROCEDURE — 99213 OFFICE O/P EST LOW 20 MIN: CPT | Performed by: OTOLARYNGOLOGY

## 2018-05-22 ASSESSMENT — PAIN SCALES - GENERAL: PAINLEVEL: MODERATE PAIN (4)

## 2018-05-22 NOTE — MR AVS SNAPSHOT
"              After Visit Summary   5/22/2018    Paulette Flores    MRN: [id number]           Patient Information     Date Of Birth          1965        Visit Information        Provider Department      5/22/2018 2:00 PM Cynthia Banerjee MD [facility]        Today's Diagnoses     Acute recurrent maxillary sinusitis    -  1      Care Instructions    Per Physician's instructions            Follow-ups after your visit        Who to contact     If you have questions or need follow up information about today's clinic visit or your schedule please contact [facility] directly at [phone].  Normal or non-critical lab and imaging results will be communicated to you by [employer], letter or phone within 4 business days after the clinic has received the results. If you do not hear from us within 7 days, please contact the clinic through [employer] or phone. If you have a critical or abnormal lab result, we will notify you by phone as soon as possible.  Submit refill requests through [employer] or call your pharmacy and they will forward the refill request to us. Please allow 3 business days for your refill to be completed.          Additional Information About Your Visit        MyChart Information     [employer] gives you secure access to your electronic health record. If you see a primary care provider, you can also send messages to your care team and make appointments. If you have questions, please call your primary care clinic.  If you do not have a primary care provider, please call [phone] and they will assist you.        Care EveryWhere ID     This is your Care EveryWhere ID. This could be used by other organizations to access your [place] medical records  [id number]        Your Vitals Were     Pulse Temperature Height BMI (Body Mass Index)          84 99.4  F (37.4  C) (Oral) 1.6 m (5' 3\") 38.44 kg/m2         Blood Pressure from Last 3 Encounters:   05/22/18 142/82   02/14/18 111/66 "   02/07/18 116/57    Weight from Last 3 Encounters:   05/22/18 98.4 kg (217 lb)   02/14/18 98.9 kg (218 lb)   02/07/18 98.9 kg (218 lb)              Today, you had the following     No orders found for display       Primary Care Provider Office Phone # Fax #    Laura Sarah PA-C [phone]       [address]  [place] [address]        Equal Access to Services     RENAE SALDANA : Hadii aad ku hadasho Soomaali, waaxda luqadaha, qaybta kaalmada adeegyada, waxay idiin hayaan adenorah kharagretchen alvarez . So [facility] [phone].    ATENCIÓN: Si habla español, tiene a garcia disposición servicios gratuitos de asistencia lingüística. [facility] [phone].    We comply with applicable federal civil rights laws and [place] laws. We do not discriminate on the basis of race, color, national origin, age, disability, sex, sexual orientation, or gender identity.            Thank you!     Thank you for choosing [facility]  for your care. Our goal is always to provide you with excellent care. Hearing back from our patients is one way we can continue to improve our services. Please take a few minutes to complete the written survey that you may receive in the mail after your visit with us. Thank you!             Your Updated Medication List - Protect others around you: Learn how to safely use, store and throw away your medicines at www.disposemymeds.org.      Notice  As of 5/22/2018  6:57 PM    You have not been prescribed any medications.

## 2018-05-22 NOTE — NURSING NOTE
"Initial /82 (BP Location: Right arm, Patient Position: Sitting, Cuff Size: Adult Regular)  Pulse 84  Temp 99.4  F (37.4  C) (Oral)  Ht 1.6 m (5' 3\")  Wt 98.4 kg (217 lb)  BMI 38.44 kg/m2 Estimated body mass index is 38.44 kg/(m^2) as calculated from the following:    Height as of this encounter: 1.6 m (5' 3\").    Weight as of this encounter: 98.4 kg (217 lb). .    Reina Christine CMA    "

## 2018-05-22 NOTE — PROGRESS NOTES
"History of Present Illness - Paulette Flores is a 53 year old female who presents today with ongoing concerns about sinus problems. Recent CT Sinus 2/12/18 demonstrated pansinusitis with mucosal thickening but widely patent maxillary sinuses.     Today she presents after 2 days of facial pressure, face discomfort, and left ear fullness.  She also feels that she has had a fever during this current episode.      Past Medical History -   Patient Active Problem List   Diagnosis     Conductive hearing loss in left ear     DDD (degenerative disc disease), lumbar     Decreased GFR     Cervical cancer screening       Current Medications -   None      Allergies -   Allergies   Allergen Reactions     Percocet [Oxycodone-Acetaminophen] Difficulty breathing     Throat closed     Vicodin [Hydrocodone-Acetaminophen] Nausea and Vomiting       Social History -   Social History     Social History     Marital status:      Spouse name: N/A     Number of children: N/A     Years of education: N/A     Social History Main Topics     Smoking status: Current Some Day Smoker     Packs/day: 0.10     Years: 15.00     Types: Cigarettes     Smokeless tobacco: Never Used     Alcohol use No     Drug use: No     Sexual activity: Not on file     Other Topics Concern     Not on file     Social History Narrative       Family History -   Family History   Problem Relation Age of Onset     Hypertension Mother      Thyroid Disease Mother      Hypertension Father      Thyroid Disease Sister        Review of Systems - As per HPI and PMHx, otherwise 7 system review of the head and neck negative. 10+ system review negative.    Exam:  /82 (BP Location: Right arm, Patient Position: Sitting, Cuff Size: Adult Regular)  Pulse 84  Temp 99.4  F (37.4  C) (Oral)  Ht 1.6 m (5' 3\")  Wt 98.4 kg (217 lb)  BMI 38.44 kg/m2  General - The patient is well nourished and well developed, and appears to have good nutritional status.  Alert and oriented to " person and place, answers questions and cooperates with examination appropriately.   Head and Face - Normocephalic and atraumatic, with no gross asymmetry noted of the contour of the facial features.  The facial nerve is intact, with strong symmetric movements.  Eyes - Extraocular movements intact.  Sclera were not icteric or injected, conjunctiva were pink and moist.  Nose-intranasal examination demonstrates normal-appearing nasal mucosa.  No nasal polyps.      A/P - Paulette Flores is a 53 year old female with a history of chronic sinusitis with current acute sinusitis episode.  I reassured her that this is likely viral at this stage.  I recommended treatment with nasal saline irrigations as well as Afrin.  He was also instructed on taking nonsteroidal anti-inflammatory drugs for inflammation control.  She was advised to drink plenty of fluids and attempt to get more than enough rest.  I advised her to call in 1 week if she continues to have symptoms and I would then start her on antibiotic therapy and then follow-up for endoscopic examination.      Dr. Cynthia Banerjee MD  Otolaryngology  [facility]

## 2018-05-22 NOTE — LETTER
5/22/2018         RE: Paulette Flores  [address]  [facility] [place] [address]        Dear Colleague,    Thank you for referring your patient, Paulette Flores, to the [facility]. Please see a copy of my visit note below.    History of Present Illness - Paulette Flores is a 53 year old female who presents today with ongoing concerns about sinus problems. Recent CT Sinus 2/12/18 demonstrated pansinusitis with mucosal thickening but widely patent maxillary sinuses.     Today she presents after 2 days of facial pressure, face discomfort, and left ear fullness.  She also feels that she has had a fever during this current episode.      Past Medical History -   Patient Active Problem List   Diagnosis     Conductive hearing loss in left ear     DDD (degenerative disc disease), lumbar     Decreased GFR     Cervical cancer screening       Current Medications -   None      Allergies -   Allergies   Allergen Reactions     Percocet [Oxycodone-Acetaminophen] Difficulty breathing     Throat closed     Vicodin [Hydrocodone-Acetaminophen] Nausea and Vomiting       Social History -   Social History     Social History     Marital status:      Spouse name: N/A     Number of children: N/A     Years of education: N/A     Social History Main Topics     Smoking status: Current Some Day Smoker     Packs/day: 0.10     Years: 15.00     Types: Cigarettes     Smokeless tobacco: Never Used     Alcohol use No     Drug use: No     Sexual activity: Not on file     Other Topics Concern     Not on file     Social History Narrative       Family History -   Family History   Problem Relation Age of Onset     Hypertension Mother      Thyroid Disease Mother      Hypertension Father      Thyroid Disease Sister        Review of Systems - As per HPI and PMHx, otherwise 7 system review of the head and neck negative. 10+ system review negative.    Exam:  /82 (BP Location: Right arm, Patient Position: Sitting, Cuff Size: Adult  "Regular)  Pulse 84  Temp 99.4  F (37.4  C) (Oral)  Ht 1.6 m (5' 3\")  Wt 98.4 kg (217 lb)  BMI 38.44 kg/m2  General - The patient is well nourished and well developed, and appears to have good nutritional status.  Alert and oriented to person and place, answers questions and cooperates with examination appropriately.   Head and Face - Normocephalic and atraumatic, with no gross asymmetry noted of the contour of the facial features.  The facial nerve is intact, with strong symmetric movements.  Eyes - Extraocular movements intact.  Sclera were not icteric or injected, conjunctiva were pink and moist.  Nose-intranasal examination demonstrates normal-appearing nasal mucosa.  No nasal polyps.      A/P - Paulette Flores is a 53 year old female with a history of chronic sinusitis with current acute sinusitis episode.  I reassured her that this is likely viral at this stage.  I recommended treatment with nasal saline irrigations as well as Afrin.  He was also instructed on taking nonsteroidal anti-inflammatory drugs for inflammation control.  She was advised to drink plenty of fluids and attempt to get more than enough rest.  I advised her to call in 1 week if she continues to have symptoms and I would then start her on antibiotic therapy and then follow-up for endoscopic examination.      Dr. Cynthia Banerjee MD  Otolaryngology  [facility]      Again, thank you for allowing me to participate in the care of your patient.        Sincerely,        Cynthia Banerjee MD    "

## 2018-07-23 ENCOUNTER — RADIANT APPOINTMENT (OUTPATIENT)
Dept: MAMMOGRAPHY | Facility: CLINIC | Age: 53
End: 2018-07-23
Attending: PHYSICIAN ASSISTANT
Payer: COMMERCIAL

## 2018-07-23 DIAGNOSIS — Z12.31 VISIT FOR SCREENING MAMMOGRAM: ICD-10-CM

## 2018-07-23 PROCEDURE — 77067 SCR MAMMO BI INCL CAD: CPT | Mod: TC

## 2018-07-23 PROCEDURE — 77063 BREAST TOMOSYNTHESIS BI: CPT | Mod: TC

## 2018-10-02 ENCOUNTER — RADIANT APPOINTMENT (OUTPATIENT)
Dept: GENERAL RADIOLOGY | Facility: CLINIC | Age: 53
End: 2018-10-02
Attending: INTERNAL MEDICINE
Payer: COMMERCIAL

## 2018-10-02 ENCOUNTER — OFFICE VISIT (OUTPATIENT)
Dept: INTERNAL MEDICINE | Facility: CLINIC | Age: 53
End: 2018-10-02
Payer: COMMERCIAL

## 2018-10-02 VITALS
SYSTOLIC BLOOD PRESSURE: 119 MMHG | OXYGEN SATURATION: 97 % | HEART RATE: 74 BPM | DIASTOLIC BLOOD PRESSURE: 79 MMHG | TEMPERATURE: 97.7 F | RESPIRATION RATE: 16 BRPM | BODY MASS INDEX: 35.07 KG/M2 | WEIGHT: 198 LBS

## 2018-10-02 DIAGNOSIS — Z23 NEED FOR VACCINATION: Primary | ICD-10-CM

## 2018-10-02 DIAGNOSIS — M54.6 LOWER THORACIC BACK PAIN: ICD-10-CM

## 2018-10-02 DIAGNOSIS — Z23 NEED FOR PROPHYLACTIC VACCINATION WITH TETANUS-DIPHTHERIA (TD): ICD-10-CM

## 2018-10-02 DIAGNOSIS — Z11.4 SCREENING FOR HIV (HUMAN IMMUNODEFICIENCY VIRUS): ICD-10-CM

## 2018-10-02 PROCEDURE — 90715 TDAP VACCINE 7 YRS/> IM: CPT | Performed by: INTERNAL MEDICINE

## 2018-10-02 PROCEDURE — 72120 X-RAY BEND ONLY L-S SPINE: CPT | Mod: FY

## 2018-10-02 PROCEDURE — 99214 OFFICE O/P EST MOD 30 MIN: CPT | Mod: 25 | Performed by: INTERNAL MEDICINE

## 2018-10-02 PROCEDURE — 90471 IMMUNIZATION ADMIN: CPT | Performed by: INTERNAL MEDICINE

## 2018-10-02 PROCEDURE — 72080 X-RAY EXAM THORACOLMB 2/> VW: CPT | Mod: FY

## 2018-10-02 RX ORDER — MULTIPLE VITAMINS W/ MINERALS TAB 9MG-400MCG
1 TAB ORAL DAILY
COMMUNITY
End: 2021-10-06

## 2018-10-02 NOTE — PROGRESS NOTES
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28    Grandchildren           Back Pain       Duration: x 3 months        Specific cause: none -- fell into a pothole in a parking lot 3 weeks ago but no other clear memory -- landed on right hip and left palm    Description:   Location of pain: middle of back center on spine and right hip  Character of pain: fullness -- feels like a binder around the spine, midline to right lower thoracic region -- couldn't wear a bra after rib/muscle injury 3/2018, reports 10 week recovery period  Pain radiation:none  New numbness or weakness in legs, not attributed to pain:  no     Intensity: pressure  In back, pain in hip    History:   Pain interferes with job: YES  History of back problems: injury in 8th grade, fell down concrete stairs, hurt rib March 2018 now quiescent  Any previous MRI or X-rays: Yes- at [place].  Date 2016  Sees a specialist for back pain:  No  Therapies tried without relief: chiropractor and Physical Therapy --     Alleviating factors:   Improved by: none      Precipitating factors:  Worsened by: Lifting, Bending, Standing (improves but worsens her hip), Sitting (worse), Lying Flat, Walking, Coughing and pressure in back always there    Accompanying Signs & Symptoms:  Risk of Fracture:  None  Risk of Cauda Equina:  None  Risk of Infection:  None  Risk of Cancer:  None  Risk of Ankylosing Spondylitis:  Onset at age <35, male, AND morning back stiffness. no     1. Need for vaccination    2. Screening for HIV (human immunodeficiency virus)    3. Need for prophylactic vaccination with tetanus-diphtheria (Td)        PMH: Updated and/or reviewed in chart.    PSH: Updated and/or reviewed in chart.    Family History: Updated and/or reviewed in chart.     ROS:  Constitutional, neuro, endocrine,  pulmonary, cardiac, gastrointestinal, genitourinary, musculoskeletal, integument and psychiatric systems are otherwise negative.    OBJECTIVE:                                                    /79  Pulse 74  Temp 97.7  F (36.5  C) (Tympanic)  Resp 16  Wt 198 lb (89.8 kg)  SpO2 97%  BMI 35.07 kg/m2    GEN: No acute distress  EYES: No conjunctival injection or icterus, EOMI grossly intact  RESP: Unlabored, regular  BACK: thoracic range of motion grossly within normal limits but not fully examined; significant lower thoracic paraspinous tension without tenderness to palpation; no gross abnormalities otherwise; only mild tenderness to palpation without bony prominence or compression tenderness  NEURO: Normal gait, MAEx4, lower extremity strength 5/5, light touch sensation grossly intact, DTRs 2+/symmetrical at patellae and biceps tendons  PSYCH: Normal mood and affect     ASSESSMENT/PLAN:                                                    Lower thoracic back pain  Likely musculoskeletal -- has significant tenderness and tension at posterior rib margin.  Discussed diagnostic uncertainty and obtaining films to rule-out compression fracture or gross malalignment that would benefit from specialty referral prior.  Taking PRN ibuprofen for pain.  Offered muscle relaxant, patient declined.  If worsening or lack of improvement in 6-8 weeks, consider thoracolumbar MRI.  - XR thoracolumbar and lumbar flex/ext  - PHYSICAL THERAPY REFERRAL; Future    1. Screening for HIV (human immunodeficiency virus)  - HIV Screening    2. Need for prophylactic vaccination with tetanus-diphtheria (Td)  -      ADMIN VACCINE, FIRST  - TDAP VACCINE (ADACEL) [66427.002]     Orders Placed This Encounter          ADMIN VACCINE, FIRST     TDAP VACCINE (ADACEL) [48629.002]     HIV Screening     multivitamin, therapeutic with minerals (MULTI-VITAMIN) TABS tablet          Rafi Morley MD

## 2018-10-02 NOTE — NURSING NOTE
Screening Questionnaire for Adult Immunization    Are you sick today?   No   Do you have allergies to medications, food, a vaccine component or latex?   No   Have you ever had a serious reaction after receiving a vaccination?   No   Do you have a long-term health problem with heart disease, lung disease, asthma, kidney disease, metabolic disease (e.g. diabetes), anemia, or other blood disorder?   No   Do you have cancer, leukemia, HIV/AIDS, or any other immune system problem?   No   In the past 3 months, have you taken medications that affect  your immune system, such as prednisone, other steroids, or anticancer drugs; drugs for the treatment of rheumatoid arthritis, Crohn s disease, or psoriasis; or have you had radiation treatments?   No   Have you had a seizure, or a brain or other nervous system problem?   No   During the past year, have you received a transfusion of blood or blood     products, or been given immune (gamma) globulin or antiviral drug?   No   For women: Are you pregnant or is there a chance you could become        pregnant during the next month?   No   Have you received any vaccinations in the past 4 weeks?   No     Immunization questionnaire answers were all negative.        Per orders of Dr. Morley, injection of TDAP given by Flakita Mayo. Patient instructed to remain in clinic for 15 minutes afterwards, and to report any adverse reaction to me immediately.       Screening performed by Flakita Mayo on 10/2/2018 at 1:26 PM.

## 2018-10-02 NOTE — MR AVS SNAPSHOT
After Visit Summary   10/2/2018    Paulette Flores    MRN: [id number]           Patient Information     Date Of Birth          1965        Visit Information        Provider Department      10/2/2018 12:30 PM Rafi Morley MD [facility] [place]        Today's Diagnoses     Need for vaccination    -  1    Screening for HIV (human immunodeficiency virus)        Need for prophylactic vaccination with tetanus-diphtheria (Td)        Lower thoracic back pain           Follow-ups after your visit        Additional Services     PHYSICAL THERAPY REFERRAL       If you have not heard from the scheduling office within 2 business days, please call [phone] for all locations, with the exception of [place], please call [phone] and [place], please call [phone].    Please be aware that coverage of these services is subject to the terms and limitations of your health insurance plan.  Call member services at your health plan with any benefit or coverage questions.                  Future tests that were ordered for you today     Open Future Orders        Priority Expected Expires Ordered    XR Lumbar Spine G/E 4 Views Routine 10/2/2018 10/2/2019 10/2/2018    XR Thoracic Spine G/E 4 Views Routine 10/2/2018 10/2/2019 10/2/2018    PHYSICAL THERAPY REFERRAL Routine  10/2/2019 10/2/2018            Who to contact     Normal or non-critical lab and imaging results will be communicated to you by MyChart, letter or phone within 4 business days after the clinic has received the results. If you do not hear from us within 7 days, please contact the clinic through MyChart or phone. If you have a critical or abnormal lab result, we will notify you by phone as soon as possible.  Submit refill requests through [employer] or call your pharmacy and they will forward the refill request to us. Please allow 3 business days for your refill to be completed.          If you need to speak with a  for  additional information , please call: [phone]             Additional Information About Your Visit        MyChart Information     [employer] gives you secure access to your electronic health record. If you see a primary care provider, you can also send messages to your care team and make appointments. If you have questions, please call your primary care clinic.  If you do not have a primary care provider, please call [phone] and they will assist you.        Care EveryWhere ID     This is your Care EveryWhere ID. This could be used by other organizations to access your [facility] medical records  [id number]        Your Vitals Were     Pulse Temperature Respirations Pulse Oximetry BMI (Body Mass Index)       74 97.7  F (36.5  C) (Tympanic) 16 97% 35.07 kg/m2        Blood Pressure from Last 3 Encounters:   10/02/18 119/79   05/22/18 142/82   02/14/18 111/66    Weight from Last 3 Encounters:   10/02/18 198 lb (89.8 kg)   05/22/18 217 lb (98.4 kg)   02/14/18 218 lb (98.9 kg)              We Performed the Following          ADMIN VACCINE, FIRST     HIV Screening     TDAP VACCINE (ADACEL) [85796.002]        Primary Care Provider Office Phone # Fax #    Laura Sarah PA-C [phone]       [address] [place] [address]        Equal Access to Services     [facility]: Hadii aad ku hadasho Soomaali, waaxda luqadaha, qaybta kaalmada adeegyada, erma alvarez . So [facility] [phone].    ATENCIÓN: Si habla español, tiene a garcia disposición servicios gratuitos de asistencia lingüística. Jefe al [phone].    We comply with applicable federal civil rights laws and [place] laws. We do not discriminate on the basis of race, color, national origin, age, disability, sex, sexual orientation, or gender identity.            Thank you!     Thank you for choosing [facility]  for your care. Our goal is always to provide you with excellent care. Hearing back from  our patients is one way we can continue to improve our services. Please take a few minutes to complete the written survey that you may receive in the mail after your visit with us. Thank you!             Your Updated Medication List - Protect others around you: Learn how to safely use, store and throw away your medicines at www.disposemymeds.org.          This list is accurate as of 10/2/18  1:13 PM.  Always use your most recent med list.                   Brand Name Dispense Instructions for use Diagnosis    Multi-vitamin Tabs tablet      Take 1 tablet by mouth daily

## 2018-10-04 ENCOUNTER — THERAPY VISIT (OUTPATIENT)
Dept: PHYSICAL THERAPY | Facility: CLINIC | Age: 53
End: 2018-10-04
Payer: COMMERCIAL

## 2018-10-04 DIAGNOSIS — M54.6 LOWER THORACIC BACK PAIN: ICD-10-CM

## 2018-10-04 PROCEDURE — 97110 THERAPEUTIC EXERCISES: CPT | Mod: GP | Performed by: PHYSICAL THERAPIST

## 2018-10-04 PROCEDURE — 97035 APP MDLTY 1+ULTRASOUND EA 15: CPT | Mod: GP | Performed by: PHYSICAL THERAPIST

## 2018-10-04 PROCEDURE — 97161 PT EVAL LOW COMPLEX 20 MIN: CPT | Mod: GP | Performed by: PHYSICAL THERAPIST

## 2018-10-04 NOTE — MR AVS SNAPSHOT
After Visit Summary   10/4/2018    Paulette Flores    MRN: [id number]           Patient Information     Date Of Birth          1965        Visit Information        Provider Department      10/4/2018 11:00 AM Tay Roman, PT [facility] [place]        Today's Diagnoses     Lower thoracic back pain           Follow-ups after your visit        Your next 10 appointments already scheduled     Oct 11, 2018 11:40 AM CDT   [facility] Spine with Tay Roman PT   [facility] [place] [facility] ([facility] [place])    [address]  [place] [address]   [phone]            Oct 18, 2018  8:20 AM CDT   [facility] Spine with Tay Roman PT   [facility] [place] [facility] ([facility] [place])    [address]  [place] [address] [phone]              Who to contact     If you have questions or need follow up information about today's clinic visit or your schedule please contact [facility] directly at [phone].  Normal or non-critical lab and imaging results will be communicated to you by Kashhart, letter or phone within 4 business days after the clinic has received the results. If you do not hear from us within 7 days, please contact the clinic through MyLorryt or phone. If you have a critical or abnormal lab result, we will notify you by phone as soon as possible.  Submit refill requests through [employer] or call your pharmacy and they will forward the refill request to us. Please allow 3 business days for your refill to be completed.          Additional Information About Your Visit        MyChart Information     [employer] gives you secure access to your electronic health record. If you see a primary care provider, you can also send messages to your care team and make appointments. If you have questions, please call your primary care clinic.  If you do not have a primary care provider,  please call [phone] and they will assist you.        Care EveryWhere ID     This is your Care EveryWhere ID. This could be used by other organizations to access your [place] medical records  [id number]         Blood Pressure from Last 3 Encounters:   10/02/18 119/79   05/22/18 142/82   02/14/18 111/66    Weight from Last 3 Encounters:   10/02/18 89.8 kg (198 lb)   05/22/18 98.4 kg (217 lb)   02/14/18 98.9 kg (218 lb)              We Performed the Following     HC PT EVAL, LOW COMPLEXITY     [facility] INITIAL EVAL REPORT     PHYSICAL THERAPY REFERRAL     THERAPEUTIC EXERCISES     ULTRASOUND THERAPY        Primary Care Provider Office Phone # Fax #    Laura Sarah PA-C [phone]       [address] [place] [address]        Equal Access to Services     [facility]: Hadii aad ku hadasho Soomaali, waaxda luqadaha, qaybta kaalmada adeegyada, waxay idiin hayaan blaine kharagretchen alvarez . So [facility] [phone].    ATENCIÓN: Si habla español, tiene a garcia disposición servicios gratuitos de asistencia lingüística. Llame al [phone].    We comply with applicable federal civil rights laws and [place] laws. We do not discriminate on the basis of race, color, national origin, age, disability, sex, sexual orientation, or gender identity.            Thank you!     Thank you for choosing [facility]  for your care. Our goal is always to provide you with excellent care. Hearing back from our patients is one way we can continue to improve our services. Please take a few minutes to complete the written survey that you may receive in the mail after your visit with us. Thank you!             Your Updated Medication List - Protect others around you: Learn how to safely use, store and throw away your medicines at www.disposemymeds.org.          This list is accurate as of 10/4/18 12:04 PM.  Always use your most recent med list.                   Brand Name Dispense  Instructions for use Diagnosis    Multi-vitamin Tabs tablet      Take 1 tablet by mouth daily

## 2018-10-04 NOTE — PROGRESS NOTES
"[facility] Initial Evaluation  Subjective:  Patient is a 53 year old female presenting with rehab back hpi.   Paulette Flores is a 53 year old female with a lumbar and thoracic condition.  Condition occurred with:  Insidious onset.  Condition occurred: at home.  This is a new and recurrent condition  PT ordered 10-4-18 for lower thoracic back pain..    Patient reports pain:  Lower thoracic spine.  Radiates to:  No radiation.  Pain is described as aching and is constant and reported as 6/10.  Associated symptoms:  Loss of strength. Pain is the same all the time.  Symptoms are exacerbated by bending, certain positions and sitting and relieved by activity/movement.  Since onset symptoms are unchanged.        General health as reported by patient is excellent.  Pertinent medical history includes:  Overweight and smoking.  Medical allergies: \"med's pain\"  Other surgeries include:  Other (, cholecystectomy).  Current medications:  None as reported by the patient.  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        Barriers include:  None as reported by the patient.    Red flags:  None as reported by the patient.                        Objective:        LUMBAR:    Posture: no gross asymmetries    Neurological:    Motor Deficit:  Myotomes L R   L1-2 (hip flexion) 4/5 4/5   L3 (knee extension) 5/5 5/5   L4 (ankle DF) 5/5 5/5   L5 (g. toe ext)     S1 (ankle PF or knee flex) 5/5 5/5   MMT hip abd 4+/5 bilaterally hip ext: 4/5 bilaterally    Sensory Deficit, Reflexes: normal light touch sensation    Dural Signs:   L R   Slump neg neg   SLR neg neg   Other:     AROM: (Major, Moderate, Minimal or Nil loss)  Movement Loss Earl Mod Min Nil Pain   Flexion    x +   Extension   x  +   Side Gliding L    x No pain   Side Gliding R    x No pain     Repeated movement testing:   (During: produces, abolishes, increases, decreases, no effect, centralizing, peripheralizing; After: better, worse, no " better, no worse, no effect, centralized, peripheralized)    Pre-test Symptoms Standing:    Symptoms During Symptoms After ROM increased ROM decreased No Effect   FIS x       Rep FIS x    No change   EIS x       Rep EIS x  x  better     If required Pre-test Symptoms:    Symptoms During Symptoms After ROM increased ROM decreased No Effect   SGIS - L     x   Rep SGIS - L     x   SGIS - R     x   Rep SGIS - R     x     Static Tests: spring testing negative  Other Tests: soft tissue tightness in B thoracolumbar erector spinae        System    Physical Exam    General     ROS    Assessment/Plan:    Patient is a 53 year old female with thoracic and lumbar complaints.    Patient has the following significant findings with corresponding treatment plan.                Diagnosis 1:  Thoraco-lumbar pain  Pain -  hot/cold therapy, US, electric stimulation, manual therapy, education and home program  Decreased ROM/flexibility - manual therapy, therapeutic exercise, therapeutic activity and home program  Decreased strength - therapeutic exercise, therapeutic activities and home program    Therapy Evaluation Codes:   1) History comprised of:   Personal factors that impact the plan of care:      None.    Comorbidity factors that impact the plan of care are:      None.     Medications impacting care: None.  2) Examination of Body Systems comprised of:   Body structures and functions that impact the plan of care:      Lumbar spine and Thoracic Spine.   Activity limitations that impact the plan of care are:      Sitting.  3) Clinical presentation characteristics are:   Stable/Uncomplicated.  4) Decision-Making    Low complexity using standardized patient assessment instrument and/or measureable assessment of functional outcome.  Cumulative Therapy Evaluation is: Low complexity.    Previous and current functional limitations:  (See Goal Flow Sheet for this information)    Short term and Long term goals: (See Goal Flow Sheet for this  information)     Communication ability:  Patient appears to be able to clearly communicate and understand verbal and written communication and follow directions correctly.  Treatment Explanation - The following has been discussed with the patient:   RX ordered/plan of care  Anticipated outcomes  Possible risks and side effects  This patient would benefit from PT intervention to resume normal activities.   Rehab potential is good.    Frequency:  1 X week, once daily  Duration:  for 8 weeks  Discharge Plan:  Achieve all LTG.  Independent in home treatment program.  Reach maximal therapeutic benefit.    Please refer to the daily flowsheet for treatment today, total treatment time and time spent performing 1:1 timed codes.

## 2018-11-05 DIAGNOSIS — M77.42 METATARSALGIA OF LEFT FOOT: Primary | ICD-10-CM

## 2018-12-04 ENCOUNTER — MEDICAL CORRESPONDENCE (OUTPATIENT)
Dept: HEALTH INFORMATION MANAGEMENT | Facility: CLINIC | Age: 53
End: 2018-12-04

## 2018-12-04 ENCOUNTER — OFFICE VISIT (OUTPATIENT)
Dept: OTOLARYNGOLOGY | Facility: CLINIC | Age: 53
End: 2018-12-04
Payer: COMMERCIAL

## 2018-12-04 VITALS
DIASTOLIC BLOOD PRESSURE: 68 MMHG | WEIGHT: 180 LBS | HEART RATE: 71 BPM | SYSTOLIC BLOOD PRESSURE: 103 MMHG | TEMPERATURE: 97.8 F | HEIGHT: 63 IN | BODY MASS INDEX: 31.89 KG/M2

## 2018-12-04 DIAGNOSIS — H61.23 BILATERAL IMPACTED CERUMEN: Primary | ICD-10-CM

## 2018-12-04 DIAGNOSIS — H90.3 BILATERAL SENSORINEURAL HEARING LOSS: ICD-10-CM

## 2018-12-04 DIAGNOSIS — H69.02 PATULOUS EUSTACHIAN TUBE OF LEFT EAR: ICD-10-CM

## 2018-12-04 PROCEDURE — 69210 REMOVE IMPACTED EAR WAX UNI: CPT | Performed by: OTOLARYNGOLOGY

## 2018-12-04 PROCEDURE — 99203 OFFICE O/P NEW LOW 30 MIN: CPT | Mod: 25 | Performed by: OTOLARYNGOLOGY

## 2018-12-04 ASSESSMENT — PAIN SCALES - GENERAL: PAINLEVEL: NO PAIN (0)

## 2018-12-04 NOTE — PROGRESS NOTES
History of Present Illness - Paulette Flores is a 53 year old female for ear cleaning. Patients reports a history of hearing loss on the left side and feels it is full and hearing is diminished on the left side currently.     Patient states she has lost weight recently with diet changes and at home exercising.      Patient has been using a Neti pot for nasal congestion that was talked about at a previous appointment. She states it has improved her nasal congestion greatly.    Past Medical History -   Patient Active Problem List   Diagnosis     Conductive hearing loss in left ear     DDD (degenerative disc disease), lumbar     Decreased GFR     Cervical cancer screening     Lower thoracic back pain       Current Medications -   Current Outpatient Prescriptions:      multivitamin, therapeutic with minerals (MULTI-VITAMIN) TABS tablet, Take 1 tablet by mouth daily, Disp: , Rfl:     Allergies -   Allergies   Allergen Reactions     Percocet [Oxycodone-Acetaminophen] Difficulty breathing     Throat closed     Vicodin [Hydrocodone-Acetaminophen] Nausea and Vomiting       Social History -   Social History     Social History     Marital status:      Spouse name: N/A     Number of children: N/A     Years of education: N/A     Social History Main Topics     Smoking status: Current Some Day Smoker     Packs/day: 0.10     Years: 15.00     Types: Cigarettes     Smokeless tobacco: Never Used      Comment: trying to quit/cut down     Alcohol use No     Drug use: No     Sexual activity: Not on file     Other Topics Concern     Not on file     Social History Narrative    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 10+  "system review negative.    Physical Exam  /68 (BP Location: Right arm, Patient Position: Sitting, Cuff Size: Adult Regular)  Pulse 71  Temp 97.8  F (36.6  C) (Oral)  Ht 1.6 m (5' 3\")  Wt 81.6 kg (180 lb)  BMI 31.89 kg/m2  General - The patient is well nourished and well developed, and appears to have good nutritional status.  Alert and oriented to person and place, answers questions and cooperates with examination appropriately.   Head and Face - Normocephalic and atraumatic, with no gross asymmetry noted of the contour of the facial features.  The facial nerve is intact, with strong symmetric movements.  Voice and Breathing - The patient was breathing comfortably without the use of accessory muscles. There was no wheezing, stridor, or stertor.  The patients voice was clear and strong, and had appropriate pitch and quality.  Ears - Bilateral pinna and EACs with normal appearing overlying skin. Tympanic membrane intact with good mobility on pneumatic otoscopy bilaterally. Bony landmarks of the ossicular chain are normal. The tympanic membranes are normal in appearance. No perforation, or masses. No fluid or purulence was seen in the external canal or the middle ear. Left ear canal is clear, and tympanic membrane is intact. Ear drum is mobile on auto insufflate. There is Left side posterior superior quadrant retraction.    Eyes - Extraocular movements intact.  Sclera were not icteric or injected, conjunctiva were pink and moist.  Mouth - Examination of the oral cavity showed pink, healthy oral mucosa. No lesions or ulcerations noted.  The tongue was mobile and midline, and the dentition were in good condition.    Throat - The walls of the oropharynx were smooth, pink, moist, symmetric, and had no lesions or ulcerations.  The tonsillar pillars and soft palate were symmetric.  The uvula was midline on elevation.  Neck - Normal midline excursion of the laryngotracheal complex during swallowing.  Full range of " motion on passive movement.  Palpation of the occipital, submental, submandibular, internal jugular chain, and supraclavicular nodes did not demonstrate any abnormal lymph nodes or masses.  The carotid pulse was palpable bilaterally.  Palpation of the thyroid was soft and smooth, with no nodules or goiter appreciated.  The trachea was mobile and midline.  Nose - External contour is symmetric, no gross deflection or scars.  Nasal mucosa is pink and moist with no abnormal mucus.  The septum was midline and non-obstructive, turbinates of normal size and position.  No polyps, masses, or purulence noted on examination.    Procedure: Cerumen Disimpaction  Diagnosis: Cerumen Impaction    Procedure and Findings  Ears - On examination of the ears, I found that the  ears were impacted with dense cerumen obscuring visualization of the right and left TM.  Therefore, I positioned the patient and I used the binocular surgical microscope to assist in visualization of the affected ear(s).  I began by using a cerumen loop to gently lift the edges of the cerumen mass away from the walls of the external canal.  Once I did this, I was able to suction away fragments of wax and debris using suction.  Once the mass was loose enough, the entire plug was pulled from the canal with microforceps.  The tympanic membrane was intact without sign of perforation or middle ear effusion and minimal ear canal trauma.           Assessment - Paulette Flores is a 53 year old female with patulous left eustachian tube. Otherwise her ears appear normal. I do not recommend PE tube placement for this. I recommend calling to schedule a hearing test to assess for any significant change in her hearing in the past few years. Cerumen disimpaction in clinic today. Follow up as needed.     I also educated the patient on BMI calculations and her weight loss goals.    I advised the patient to keep using the Neti pot as needed for nasal congestion.     Dr. Marie  MD Lavonne  Otolaryngology  [facility]    This document serves as a record of the services and decisions personally performed and made by Dr. Cynthia Banerjee MD. It was created on his behalf by Shabana Sanchez, a trained medical scribe. The creation of this document is based the provider's statements to the medical scribe.    Mitzi Sanchez 11:08 AM 12/4/2018     The information in this document, created by a scribe for me, accurately reflects the services I personally performed and the decisions made by me. I have reviewed and approved this document for accuracy.

## 2018-12-04 NOTE — NURSING NOTE
"Initial /68 (BP Location: Right arm, Patient Position: Sitting, Cuff Size: Adult Regular)  Pulse 71  Temp 97.8  F (36.6  C) (Oral)  Ht 1.6 m (5' 3\")  Wt 81.6 kg (180 lb)  BMI 31.89 kg/m2 Estimated body mass index is 31.89 kg/(m^2) as calculated from the following:    Height as of this encounter: 1.6 m (5' 3\").    Weight as of this encounter: 81.6 kg (180 lb). .    Reina Christine CMA    "

## 2018-12-04 NOTE — MR AVS SNAPSHOT
After Visit Summary   12/4/2018    Paulette Flores    MRN: [id number]           Patient Information     Date Of Birth          1965        Visit Information        Provider Department      12/4/2018 11:00 AM Cynthia Banerjee MD [facility]        Today's Diagnoses     Bilateral impacted cerumen    -  1    Bilateral sensorineural hearing loss        Patulous eustachian tube of left ear          Care Instructions    Per Physician's instructions            Follow-ups after your visit        Additional Services     AUDIOLOGY ADULT REFERRAL       Your provider has referred you to: [facility]: [facility] [phone]   [email or web address]    Treatment:  Evaluation & Treatment  Specialty Testing:  Audiogram w/Tymps and Reflexes    Please be aware that coverage of these services is subject to the terms and limitations of your health insurance plan.  Call member services at your health plan with any benefit or coverage questions.      Please bring the following to your appointment:  >>   Any x-rays, CTs or MRIs which have been performed.  Contact the facility where they were done to arrange for  prior to your scheduled appointment.  Any new CT, MRI or other procedures ordered by your specialist must be performed at a [facility] facility or coordinated by your clinic's referral office.   >>   List of current medications  >>   This referral request   >>   Any documents/labs given to you for this referral                  Who to contact     If you have questions or need follow up information about today's clinic visit or your schedule please contact [facility] directly at [phone].  Normal or non-critical lab and imaging results will be communicated to you by MyChart, letter or phone within 4 business days after the clinic has received the results. If you do not hear from us within 7 days, please contact the clinic through [employer] or  "phone. If you have a critical or abnormal lab result, we will notify you by phone as soon as possible.  Submit refill requests through [employer] or call your pharmacy and they will forward the refill request to us. Please allow 3 business days for your refill to be completed.          Additional Information About Your Visit        [employer] Information     [employer] gives you secure access to your electronic health record. If you see a primary care provider, you can also send messages to your care team and make appointments. If you have questions, please call your primary care clinic.  If you do not have a primary care provider, please call [phone] and they will assist you.        Care EveryWhere ID     This is your Care EveryWhere ID. This could be used by other organizations to access your [facility] medical records  [id number]        Your Vitals Were     Pulse Temperature Height BMI (Body Mass Index)          71 97.8  F (36.6  C) (Oral) 1.6 m (5' 3\") 31.89 kg/m2         Blood Pressure from Last 3 Encounters:   12/04/18 103/68   10/02/18 119/79   05/22/18 142/82    Weight from Last 3 Encounters:   12/04/18 81.6 kg (180 lb)   10/02/18 89.8 kg (198 lb)   05/22/18 98.4 kg (217 lb)              We Performed the Following     AUDIOLOGY ADULT REFERRAL     Remove Cortney        Primary Care Provider Office Phone # Fax #    Laura Sarah PA-C [phone]       [id number]  [place] [address]        Equal Access to Services     [facility] AH: Hadii aad ku hadasho Soomaali, waaxda luqadaha, qaybta kaalmada adeegyada, waxay idiin hayteri alvarez . So [facility] [phone].    ATENCIÓN: Si habla español, tiene a garcia disposición servicios gratuitos de asistencia lingüística. Llame al [phone].    We comply with applicable federal civil rights laws and [place] laws. We do not discriminate on the basis of race, color, national origin, age, disability, sex, sexual orientation, or gender " identity.            Thank you!     Thank you for choosing [facility]  for your care. Our goal is always to provide you with excellent care. Hearing back from our patients is one way we can continue to improve our services. Please take a few minutes to complete the written survey that you may receive in the mail after your visit with us. Thank you!             Your Updated Medication List - Protect others around you: Learn how to safely use, store and throw away your medicines at www.disposemymeds.org.          This list is accurate as of 12/4/18  2:31 PM.  Always use your most recent med list.                   Brand Name Dispense Instructions for use Diagnosis    Multi-vitamin tablet      Take 1 tablet by mouth daily

## 2018-12-04 NOTE — LETTER
12/4/2018         RE: Paulette Flores  [address]  [place] [address]        Dear Colleague,    Thank you for referring your patient, Paulette Flores, to the [facility]. Please see a copy of my visit note below.        History of Present Illness - Paulette Flores is a 53 year old female for ear cleaning. Patients reports a history of hearing loss on the left side and feels it is full and hearing is diminished on the left side currently.     Patient states she has lost weight recently with diet changes and at home exercising.      Patient has been using a Neti pot for nasal congestion that was talked about at a previous appointment. She states it has improved her nasal congestion greatly.    Past Medical History -   Patient Active Problem List   Diagnosis     Conductive hearing loss in left ear     DDD (degenerative disc disease), lumbar     Decreased GFR     Cervical cancer screening     Lower thoracic back pain       Current Medications -   Current Outpatient Prescriptions:      multivitamin, therapeutic with minerals (MULTI-VITAMIN) TABS tablet, Take 1 tablet by mouth daily, Disp: , Rfl:     Allergies -   Allergies   Allergen Reactions     Percocet [Oxycodone-Acetaminophen] Difficulty breathing     Throat closed     Vicodin [Hydrocodone-Acetaminophen] Nausea and Vomiting       Social History -   Social History     Social History     Marital status:      Spouse name: N/A     Number of children: N/A     Years of education: N/A     Social History Main Topics     Smoking status: Current Some Day Smoker     Packs/day: 0.10     Years: 15.00     Types: Cigarettes     Smokeless tobacco: Never Used      Comment: trying to quit/cut down     Alcohol use No     Drug use: No     Sexual activity: Not on file     Other Topics Concern     Not on file     Social History Narrative    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 10+ system review negative.    Physical Exam  /68 (BP Location: Right arm, Patient Position: Sitting, Cuff Size: Adult Regular)  Pulse 71  Temp 97.8  F (36.6  C) (Oral)  Ht 1.6 m (5' 3\")  Wt 81.6 kg (180 lb)  BMI 31.89 kg/m2  General - The patient is well nourished and well developed, and appears to have good nutritional status.  Alert and oriented to person and place, answers questions and cooperates with examination appropriately.   Head and Face - Normocephalic and atraumatic, with no gross asymmetry noted of the contour of the facial features.  The facial nerve is intact, with strong symmetric movements.  Voice and Breathing - The patient was breathing comfortably without the use of accessory muscles. There was no wheezing, stridor, or stertor.  The patients voice was clear and strong, and had appropriate pitch and quality.  Ears - Bilateral pinna and EACs with normal appearing overlying skin. Tympanic membrane intact with good mobility on pneumatic otoscopy bilaterally. Bony landmarks of the ossicular chain are normal. The tympanic membranes are normal in appearance. No perforation, or masses. No fluid or purulence was seen in the external canal or the middle ear. Left ear canal is clear, and tympanic membrane is intact. Ear drum is mobile on auto insufflate. There is Left side posterior superior quadrant retraction.    Eyes - Extraocular movements intact.  Sclera were not icteric or injected, conjunctiva were pink and moist.  Mouth - Examination of the oral cavity showed pink, healthy oral mucosa. No lesions or ulcerations noted.  The tongue was mobile and midline, and the dentition were in good condition.    Throat - The walls of the oropharynx were smooth, " pink, moist, symmetric, and had no lesions or ulcerations.  The tonsillar pillars and soft palate were symmetric.  The uvula was midline on elevation.  Neck - Normal midline excursion of the laryngotracheal complex during swallowing.  Full range of motion on passive movement.  Palpation of the occipital, submental, submandibular, internal jugular chain, and supraclavicular nodes did not demonstrate any abnormal lymph nodes or masses.  The carotid pulse was palpable bilaterally.  Palpation of the thyroid was soft and smooth, with no nodules or goiter appreciated.  The trachea was mobile and midline.  Nose - External contour is symmetric, no gross deflection or scars.  Nasal mucosa is pink and moist with no abnormal mucus.  The septum was midline and non-obstructive, turbinates of normal size and position.  No polyps, masses, or purulence noted on examination.    Procedure: Cerumen Disimpaction  Diagnosis: Cerumen Impaction    Procedure and Findings  Ears - On examination of the ears, I found that the  ears were impacted with dense cerumen obscuring visualization of the right and left TM.  Therefore, I positioned the patient and I used the binocular surgical microscope to assist in visualization of the affected ear(s).  I began by using a cerumen loop to gently lift the edges of the cerumen mass away from the walls of the external canal.  Once I did this, I was able to suction away fragments of wax and debris using suction.  Once the mass was loose enough, the entire plug was pulled from the canal with microforceps.  The tympanic membrane was intact without sign of perforation or middle ear effusion and minimal ear canal trauma.           Assessment - Paulette Flores is a 53 year old female with patulous left eustachian tube. Otherwise her ears appear normal. I do not recommend PE tube placement for this. I recommend calling to schedule a hearing test to assess for any significant change in her hearing in the past few  years. Cerumen disimpaction in clinic today. Follow up as needed.     I also educated the patient on BMI calculations and her weight loss goals.    I advised the patient to keep using the Neti pot as needed for nasal congestion.     Dr. Cynthia Banerjee MD  Otolaryngology  [facility]    This document serves as a record of the services and decisions personally performed and made by Dr. Cynthia Banerjee MD. It was created on his behalf by Shabana Sanchez, a trained medical scribe. The creation of this document is based the provider's statements to the medical scribe.    Mitzi Sanchez 11:08 AM 12/4/2018     The information in this document, created by a scribe for me, accurately reflects the services I personally performed and the decisions made by me. I have reviewed and approved this document for accuracy.        Again, thank you for allowing me to participate in the care of your patient.        Sincerely,        Cynthia Banerjee MD

## 2018-12-10 ENCOUNTER — TELEPHONE (OUTPATIENT)
Dept: ORTHOPEDICS | Facility: CLINIC | Age: 53
End: 2018-12-10

## 2018-12-10 NOTE — TELEPHONE ENCOUNTER
[facility] Call Center    Phone Message    May a detailed message be left on voicemail: yes    Reason for Call: Order(s): Other:   Reason for requested: Confirmation of order - Orthotics. Re-sending from Marco A with correct diagnosis code. Dr. Carlisle to sign and fax back.  Date needed: soon as possible  Provider name: Dr. Carlisle      Action Taken: Message routed to:  [facility] ([facility] Ortho

## 2018-12-12 ENCOUNTER — MEDICAL CORRESPONDENCE (OUTPATIENT)
Dept: HEALTH INFORMATION MANAGEMENT | Facility: CLINIC | Age: 53
End: 2018-12-12

## 2018-12-14 NOTE — TELEPHONE ENCOUNTER
RN called and left voice message with [facility]UV[facility] ORthotics.  I am not sure of what other diagnosis you are wanting for the Orthotics.  Metatarsalgia is the diagnosis.  Please either call with more information on this or fax us something.

## 2018-12-19 ENCOUNTER — TELEPHONE (OUTPATIENT)
Dept: FAMILY MEDICINE | Facility: CLINIC | Age: 53
End: 2018-12-19

## 2018-12-19 DIAGNOSIS — M21.619 BUNION: Primary | ICD-10-CM

## 2018-12-19 NOTE — TELEPHONE ENCOUNTER
Consent & CAR in chart. Cytarabine and patient verified at bedside by two chemo certified RNs, Corey & Malia. Positive blood noted to left chest port. Cytarabine 1940mg in sodium chloride 0.9% 250 mL IVPB administered to left chest port  over 2 hours at 125ml/hr. Chemotherapy precautions maintained & patient educated. Frequent vital signs done as ordered. Will continue to monitor.    Patient is calling see specialist for foot at [facility] and would like recommendations to seeing someone else to look at foot. Please call to advise. Thank you.

## 2018-12-20 ENCOUNTER — TELEPHONE (OUTPATIENT)
Dept: ORTHOPEDICS | Facility: CLINIC | Age: 53
End: 2018-12-20

## 2018-12-20 NOTE — TELEPHONE ENCOUNTER
Patient confirmed PCP has never seen patient for this.  Patient does not want to drive to [place] anymore to see Ortho MD she has been seeing for years. Patient has Tailors Bunion and would like to finally have surgery.     RN advised to call Ortho MD (Dr. Carlisle) and have them referl a colleague closer to home for patient.     Patient asking if PCP knows of any foot specialist in the [facility]?    Ruth Stewart RN, BSN, PHN

## 2018-12-20 NOTE — TELEPHONE ENCOUNTER
Called and notified patient.    Phone number given for ortho .  Patient verbalized understanding and agrees with plan.       Laxmi uQiroz, RN, BSN

## 2018-12-20 NOTE — TELEPHONE ENCOUNTER
RN called and left a voice message for Paulette.  Dr. Carlisle is currently out of the office, he will return on 01-08-19.  I am not sure of someone in the [place] area that he would refer you to.  So I will check with him upon his return and get back with you.  Feel free make an appointment with him to discuss surgery also.

## 2018-12-20 NOTE — TELEPHONE ENCOUNTER
[facility] Call Center    Phone Message    May a detailed message be left on voicemail: yes    Reason for Call: Other: Previous pt of Dr Carlisle from about 8 years ago, pt is tired of getting inserts every year and is wanting to discuss foot surgery, pt would like to speak to Dr Carlisle to recommend someone near the [place] area, please call to discuss     Action Taken: Message routed to:  [facility] Ortho Clinic

## 2018-12-20 NOTE — TELEPHONE ENCOUNTER
I'm not sure what she's talking about. No foot diagnoses on her problem list and don't think I've seen her for a foot problem?

## 2018-12-20 NOTE — TELEPHONE ENCOUNTER
Called patient and notified of Laura's recommendation.    Patient stating she wants to see an Orthopedic doctor, not a podiatrist.    PCP please advise.    Laxmi Quiroz, RN, BSN

## 2019-01-08 ENCOUNTER — TELEPHONE (OUTPATIENT)
Dept: ORTHOPEDICS | Facility: CLINIC | Age: 54
End: 2019-01-08

## 2019-01-09 NOTE — TELEPHONE ENCOUNTER
RN called and spoke with Paulette  She is asking for a recommendation in the [facility] area.   Dr. Carlisle has no recommendations. Patient states that she has already made an appt with Dr. Carlisle for  02-5-19, We will see her then.

## 2019-08-08 NOTE — TELEPHONE ENCOUNTER
RECORDS RECEIVED FROM: Bunions on both feet, per pt. Last seen in 2014,    DATE RECEIVED: 09/10/19   NOTES STATUS DETAILS   OFFICE NOTE from referring provider n/a    OFFICE NOTE from other specialist Internal 10/07/14 Dr. Carlisle   DISCHARGE SUMMARY from hospital n/a    DISCHARGE REPORT from the ER n/a    OPERATIVE REPORT n/a    MEDICATION LIST n/a    IMPLANT RECORD/STICKER n/a    LABS     CBC/DIFF n/a 2017   CULTURES n/a    INJECTIONS DONE IN RADIOLOGY n/a    MRI n/a    CT SCAN n/a    XRAYS (IMAGES & REPORTS) Internal 2014   TUMOR     PATHOLOGY  Slides & report n/a

## 2019-08-09 ENCOUNTER — HOSPITAL ENCOUNTER (OUTPATIENT)
Dept: MAMMOGRAPHY | Facility: CLINIC | Age: 54
Discharge: HOME | End: 2019-08-09
Attending: PHYSICIAN ASSISTANT | Admitting: PHYSICIAN ASSISTANT
Payer: COMMERCIAL

## 2019-08-09 DIAGNOSIS — Z12.31 VISIT FOR SCREENING MAMMOGRAM: ICD-10-CM

## 2019-08-09 PROCEDURE — 77067 SCR MAMMO BI INCL CAD: CPT

## 2019-08-20 PROBLEM — M54.6 LOWER THORACIC BACK PAIN: Status: RESOLVED | Noted: 2018-10-04 | Resolved: 2019-08-20

## 2019-08-20 NOTE — PROGRESS NOTES
Pt was seen for initial evaluation and treatment session for low back pain on 10/4/18 and did not return to PT for further followup appointments. Current status is unknown and plan to discharge PT services.

## 2019-09-06 DIAGNOSIS — M79.672 BILATERAL FOOT PAIN: Primary | ICD-10-CM

## 2019-09-06 DIAGNOSIS — M79.671 BILATERAL FOOT PAIN: Primary | ICD-10-CM

## 2019-09-10 ENCOUNTER — ANCILLARY PROCEDURE (OUTPATIENT)
Dept: GENERAL RADIOLOGY | Facility: CLINIC | Age: 54
End: 2019-09-10
Attending: ORTHOPAEDIC SURGERY
Payer: COMMERCIAL

## 2019-09-10 ENCOUNTER — PRE VISIT (OUTPATIENT)
Dept: ORTHOPEDICS | Facility: CLINIC | Age: 54
End: 2019-09-10

## 2019-09-10 ENCOUNTER — OFFICE VISIT (OUTPATIENT)
Dept: ORTHOPEDICS | Facility: CLINIC | Age: 54
End: 2019-09-10
Payer: COMMERCIAL

## 2019-09-10 VITALS — WEIGHT: 170 LBS | HEIGHT: 62 IN | BODY MASS INDEX: 31.28 KG/M2

## 2019-09-10 DIAGNOSIS — M79.671 BILATERAL FOOT PAIN: ICD-10-CM

## 2019-09-10 DIAGNOSIS — M77.42 METATARSALGIA OF LEFT FOOT: Primary | ICD-10-CM

## 2019-09-10 DIAGNOSIS — M77.41 METATARSALGIA OF BOTH FEET: ICD-10-CM

## 2019-09-10 DIAGNOSIS — M77.42 METATARSALGIA OF BOTH FEET: ICD-10-CM

## 2019-09-10 DIAGNOSIS — M79.672 BILATERAL FOOT PAIN: ICD-10-CM

## 2019-09-10 ASSESSMENT — MIFFLIN-ST. JEOR: SCORE: 1318.33

## 2019-09-10 NOTE — PROGRESS NOTES
CHIEF COMPLAINT:  Bilateral foot metatarsalgia, left worse than right.      HISTORY OF PRESENT ILLNESS:  Ms. Flores presents today for further followup.  The last time she was evaluated was 10/07/2014.  The patient reports to present today to understand if there is any advancement that we have done from a surgical point of view with regards to dealing with metatarsalgia.  Continues using custom orthotics on a yearly basis.  Unfortunately, she continues having a callus on the plantar aspect of the fourth metatarsal head on the left foot which is quite painful to her.      On today's visit, we reviewed her past medical and surgical history, current medications and drug allergies.        PHYSICAL EXAMINATION:  On today's visit, she presents as a very pleasant female in no apparent distress with a height of 5 feet 2 inches and a weight of 170 pounds.  Denies to have any constitutional symptoms.      On today's visit, she presents with full range of motion of bilateral ankles, hindfoot and midfoot joints.  CMS intact.  Skin intact.  Presents with minimum pain with palpation of the lesser metatarsal heads on the right foot.  There is a painful callus located along the plantar aspect of the fourth metatarsal head on the left foot.  There is minimal pain with palpation of the second and third metatarsals.      ASSESSMENT:  Left foot metatarsalgia.      PLAN:  I discussed with the patient that at this point my recommendation is to proceed with a new updated custom orthotics and maybe to change orthotists as I believe that if she continues having a callus that she is not getting enough correction.      We discussed also that we do not have any updates with regards to how to deal with this pathology from a surgical point of view.      All questions were answered.  The patient was pleased with the discussion.  A new prescription for custom orthotics was given to the patient with a metatarsal pad bilaterally and she will proceed  also with an evaluation by one of our best orthotists at [facility] in [place].      The patient will follow up on p.r.n. basis.  All questions were answered.      TT 30 minutes, CT 20 minutes.

## 2019-09-10 NOTE — LETTER
9/10/2019       RE: Paulette Flores  [address]  [place] [id number]     Dear Colleague,    Thank you for referring your patient, Paulette Flores, to the [facility] at [facility]. Please see a copy of my visit note below.    CHIEF COMPLAINT:  Bilateral foot metatarsalgia, left worse than right.      HISTORY OF PRESENT ILLNESS:  Ms. Flores presents today for further followup.  The last time she was evaluated was 10/07/2014.  The patient reports to present today to understand if there is any advancement that we have done from a surgical point of view with regards to dealing with metatarsalgia.  Continues using custom orthotics on a yearly basis.  Unfortunately, she continues having a callus on the plantar aspect of the fourth metatarsal head on the left foot which is quite painful to her.      On today's visit, we reviewed her past medical and surgical history, current medications and drug allergies.        PHYSICAL EXAMINATION:  On today's visit, she presents as a very pleasant female in no apparent distress with a height of 5 feet 2 inches and a weight of 170 pounds.  Denies to have any constitutional symptoms.      On today's visit, she presents with full range of motion of bilateral ankles, hindfoot and midfoot joints.  CMS intact.  Skin intact.  Presents with minimum pain with palpation of the lesser metatarsal heads on the right foot.  There is a painful callus located along the plantar aspect of the fourth metatarsal head on the left foot.  There is minimal pain with palpation of the second and third metatarsals.      ASSESSMENT:  Left foot metatarsalgia.      PLAN:  I discussed with the patient that at this point my recommendation is to proceed with a new updated custom orthotics and maybe to change orthotists as I believe that if she continues having a callus that she is not getting enough correction.      We discussed also that we do not have  any updates with regards to how to deal with this pathology from a surgical point of view.      All questions were answered.  The patient was pleased with the discussion.  A new prescription for custom orthotics was given to the patient with a metatarsal pad bilaterally and she will proceed also with an evaluation by one of our best orthotists at [facility] in [place].      The patient will follow up on p.r.n. basis.  All questions were answered.      TT 30 minutes, CT 20 minutes.         Again, thank you for allowing me to participate in the care of your patient.      Sincerely,    Roni Carlisle MD

## 2019-09-10 NOTE — NURSING NOTE
"Reason For Visit:   Chief Complaint   Patient presents with     Consult     Left foot metarsalgia diagnosed 10/7/2014. Left foot pain would like an update on surgery process        Ht 1.581 m (5' 2.25\")   Wt 77.1 kg (170 lb)   BMI 30.84 kg/m      Pain Assessment  Patient Currently in Pain: Yes  0-10 Pain Scale: 6  Primary Pain Location: Foot    Sneha Alfaro ATC    "

## 2019-11-04 ENCOUNTER — HEALTH MAINTENANCE LETTER (OUTPATIENT)
Age: 54
End: 2019-11-04

## 2019-11-19 DIAGNOSIS — Z13.220 LIPID SCREENING: ICD-10-CM

## 2019-11-19 DIAGNOSIS — Z11.4 ENCOUNTER FOR SCREENING FOR HIV: ICD-10-CM

## 2019-11-19 DIAGNOSIS — Z13.1 SCREENING FOR DIABETES MELLITUS: ICD-10-CM

## 2019-11-19 LAB
CHOLEST SERPL-MCNC: 188 MG/DL
GLUCOSE SERPL-MCNC: 93 MG/DL (ref 70–99)
HDLC SERPL-MCNC: 65 MG/DL
LDLC SERPL CALC-MCNC: 114 MG/DL
NONHDLC SERPL-MCNC: 123 MG/DL
TRIGL SERPL-MCNC: 45 MG/DL

## 2019-11-19 PROCEDURE — 82947 ASSAY GLUCOSE BLOOD QUANT: CPT | Performed by: PHYSICIAN ASSISTANT

## 2019-11-19 PROCEDURE — 80061 LIPID PANEL: CPT | Performed by: PHYSICIAN ASSISTANT

## 2019-11-19 PROCEDURE — 87389 HIV-1 AG W/HIV-1&-2 AB AG IA: CPT | Performed by: PHYSICIAN ASSISTANT

## 2019-11-19 PROCEDURE — 36415 COLL VENOUS BLD VENIPUNCTURE: CPT | Performed by: PHYSICIAN ASSISTANT

## 2019-11-20 LAB — HIV 1+2 AB+HIV1 P24 AG SERPL QL IA: NONREACTIVE

## 2019-11-25 ENCOUNTER — OFFICE VISIT (OUTPATIENT)
Dept: FAMILY MEDICINE | Facility: CLINIC | Age: 54
End: 2019-11-25
Payer: COMMERCIAL

## 2019-11-25 VITALS
BODY MASS INDEX: 32.01 KG/M2 | SYSTOLIC BLOOD PRESSURE: 114 MMHG | HEART RATE: 75 BPM | WEIGHT: 176.4 LBS | TEMPERATURE: 97.1 F | DIASTOLIC BLOOD PRESSURE: 74 MMHG | OXYGEN SATURATION: 95 % | RESPIRATION RATE: 24 BRPM

## 2019-11-25 DIAGNOSIS — Z00.00 ENCOUNTER FOR ROUTINE ADULT HEALTH EXAMINATION WITHOUT ABNORMAL FINDINGS: Primary | ICD-10-CM

## 2019-11-25 PROBLEM — Z12.4 CERVICAL CANCER SCREENING: Status: RESOLVED | Noted: 2017-08-18 | Resolved: 2019-11-25

## 2019-11-25 PROCEDURE — 99396 PREV VISIT EST AGE 40-64: CPT | Performed by: PHYSICIAN ASSISTANT

## 2019-11-25 NOTE — PROGRESS NOTES
SUBJECTIVE:   CC: Paulette Flores is an 54 year old woman who presents for preventive health visit.     Healthy Habits:    Do you get at least three servings of calcium containing foods daily (dairy, green leafy vegetables, etc.)? yes    Amount of exercise or daily activities, outside of work: 7 day(s) per week    Problems taking medications regularly No    Medication side effects: No    Have you had an eye exam in the past two years? yes    Do you see a dentist twice per year? yes    Do you have sleep apnea, excessive snoring or daytime drowsiness?no      PROBLEMS TO ADD ON...  Patient informed that anything we discuss that is not related to preventative medicine, may be billed for; patient verbalizes understanding.    Fasting labs done 11/19/2019  -------------------------------------    Today's PHQ-2 Score:   PHQ-2 ( 1999 [employer]) 11/25/2019 9/10/2019   Q1: Little interest or pleasure in doing things 0 0   Q2: Feeling down, depressed or hopeless 0 0   PHQ-2 Score 0 0   Q1: Little interest or pleasure in doing things - Not at all   Q2: Feeling down, depressed or hopeless - Not at all   PHQ-2 Score - 0       Abuse: Current or Past(Physical, Sexual or Emotional)- No  Do you feel safe in your environment? Yes    Have you ever done Advance Care Planning? (For example, a Health Directive, POLST, or a discussion with a medical provider or your loved ones about your wishes): No, advance care planning information given to patient to review.  Patient plans to discuss their wishes with loved ones or provider.      Social History     Tobacco Use     Smoking status: Current Some Day Smoker     Packs/day: 0.10     Years: 16.00     Pack years: 1.60     Types: Cigarettes     Smokeless tobacco: Never Used     Tobacco comment: trying to quit/cut down   Substance Use Topics     Alcohol use: No     Alcohol/week: 0.0 standard drinks     If you drink alcohol do you typically have >3 drinks per day or >7 drinks per week? No                      Reviewed orders with patient.  Reviewed health maintenance and updated orders accordingly - Yes  Labs reviewed in EPIC    Mammogram Screening: Patient over age 50, mutual decision to screen reflected in health maintenance.    Pertinent mammograms are reviewed under the imaging tab.  History of abnormal Pap smear: NO - age 30-65 PAP every 5 years with negative HPV co-testing recommended  PAP / HPV Latest Ref Rng & Units 8/4/2017   PAP - NIL   HPV 16 DNA NEG Negative   HPV 18 DNA NEG Negative   OTHER HR HPV NEG Negative     Reviewed and updated as needed this visit by clinical staff  Tobacco  Allergies  Meds         Reviewed and updated as needed this visit by Provider        No past medical history on file.     ROS:  Other than what is noted in the HPI and PMH a complete review of systems is otherwise negative including: Constitutional, HEENT, endocrine, cardiovascular, respiratory, GI/, musculoskeletal, neuro, and psychiatric.     OBJECTIVE:   /74   Pulse 75   Temp 97.1  F (36.2  C) (Tympanic)   Resp 24   Wt 80 kg (176 lb 6.4 oz)   SpO2 95%   BMI 32.01 kg/m    EXAM:  GENERAL: healthy, alert and no distress  EYES: Eyes grossly normal to inspection, PERRL and conjunctivae and sclerae normal  HENT: ear canals and TM's normal, nose and mouth without ulcers or lesions  NECK: no adenopathy, no asymmetry, masses, or scars and thyroid normal to palpation  RESP: lungs clear to auscultation - no rales, rhonchi or wheezes  BREAST: normal without masses, tenderness or nipple discharge and no palpable axillary masses or adenopathy  CV: regular rate and rhythm, normal S1 S2, no S3 or S4, no murmur, click or rub, no peripheral edema and peripheral pulses strong  ABDOMEN: soft, nontender, no hepatosplenomegaly, no masses and bowel sounds normal  MS: no gross musculoskeletal defects noted, no edema  SKIN: no suspicious lesions or rashes  NEURO: Normal strength and tone, mentation intact and speech  "normal  PSYCH: mentation appears normal, affect normal/bright    ASSESSMENT/PLAN:       ICD-10-CM    1. Encounter for routine adult health examination without abnormal findings Z00.00        Screenings discussed, labs reviewed.       COUNSELING:   Reviewed preventive health counseling, as reflected in patient instructions    Estimated body mass index is 32.01 kg/m  as calculated from the following:    Height as of 9/10/19: 1.581 m (5' 2.25\").    Weight as of this encounter: 80 kg (176 lb 6.4 oz).     reports that she has been smoking cigarettes. She has a 1.60 pack-year smoking history. She has never used smokeless tobacco.    Counseling Resources:  ATP IV Guidelines  Pooled Cohorts Equation Calculator  Breast Cancer Risk Calculator  FRAX Risk Assessment  ICSI Preventive Guidelines  Dietary Guidelines for Americans, 2010  USDA's MyPlate  ASA Prophylaxis  Lung CA Screening    Laura Sarah PA-C  [facility]  "

## 2020-05-21 ENCOUNTER — VIRTUAL VISIT (OUTPATIENT)
Dept: DERMATOLOGY | Facility: CLINIC | Age: 55
End: 2020-05-21
Payer: COMMERCIAL

## 2020-05-21 DIAGNOSIS — L71.9 ACNE ROSACEA: Primary | ICD-10-CM

## 2020-05-21 DIAGNOSIS — L98.9 SKIN LESION: ICD-10-CM

## 2020-05-21 PROCEDURE — 99214 OFFICE O/P EST MOD 30 MIN: CPT | Mod: GT | Performed by: PHYSICIAN ASSISTANT

## 2020-05-21 RX ORDER — DOXYCYCLINE 100 MG/1
CAPSULE ORAL
Qty: 60 CAPSULE | Refills: 1 | Status: SHIPPED | OUTPATIENT
Start: 2020-05-21 | End: 2020-06-24

## 2020-05-21 RX ORDER — METRONIDAZOLE 7.5 MG/G
GEL TOPICAL
Qty: 45 G | Refills: 11 | Status: SHIPPED | OUTPATIENT
Start: 2020-05-21 | End: 2020-06-24

## 2020-05-21 NOTE — NURSING NOTE
Patient having issues with Rosacea. Is currently having a flare up. Was taking Doxycycline in the past and a cream called Metronidizole. Both of these seemed to help and then she did not have any symptoms until recently. Has questions about new medications/treatments available.     Rozina Baez LPN.................5/21/2020

## 2020-05-21 NOTE — PROGRESS NOTES
"Paulette Flores is a 55 year old female who is being evaluated via a billable video visit.      The patient has been notified of following:     \"This video visit will be conducted via a call between you and your physician/provider. We have found that certain health care needs can be provided without the need for an in-person physical exam.  This service lets us provide the care you need with a video conversation.  If a prescription is necessary we can send it directly to your pharmacy.  If lab work is needed we can place an order for that and you can then stop by our lab to have the test done at a later time.    Video visits are billed at different rates depending on your insurance coverage.  Please reach out to your insurance provider with any questions.    If during the course of the call the physician/provider feels a video visit is not appropriate, you will not be charged for this service.\"    Patient has given verbal consent for Video visit? Yes    How would you like to obtain your AVS? [facility]    Patient would like the video invitation sent by: Send to e-mail at: [email or web address]    Will anyone else be joining your video visit? No        Patient has given verbal consent for Video or telephone visit? Yes    Paulette Flores is a 55 year old year old female patient here today for evaluation and treatment of possible rosacea .  Had a severe case of rosacea approx 10 years ago which irmpoved with metrogel and doxycycline. Has been completely clear, but is noticing the redness and itching starting to return which the past 2 months. Used her old metrogel, but this did not help.   Patient has no other skin complaints today.  Remainder of the HPI, Meds, PMH, Allergies, FH, and SH was reviewed in chart.    Pertinent Hx:   rosacea  History reviewed. No pertinent past medical history.    Past Surgical History:   Procedure Laterality Date     COLONOSCOPY N/A 9/8/2016    Procedure: COLONOSCOPY;  Surgeon: Drake Evans, " MD;  Location: [facility]     ENT SURGERY  2000    has had 6 ear surgeries and 1 nose surgery        Family History   Problem Relation Age of Onset     Hypertension Mother      Thyroid Disease Mother      Hypertension Father      Thyroid Disease Sister        Social History     Socioeconomic History     Marital status:      Spouse name: Not on file     Number of children: Not on file     Years of education: Not on file     Highest education level: Not on file   Occupational History     Not on file   Social Needs     Financial resource strain: Not on file     Food insecurity     Worry: Not on file     Inability: Not on file     Transportation needs     Medical: Not on file     Non-medical: Not on file   Tobacco Use     Smoking status: Current Some Day Smoker     Packs/day: 0.10     Years: 16.00     Pack years: 1.60     Types: Cigarettes     Smokeless tobacco: Never Used     Tobacco comment: trying to quit/cut down   Substance and Sexual Activity     Alcohol use: No     Alcohol/week: 0.0 standard drinks     Drug use: No     Sexual activity: Not on file   Lifestyle     Physical activity     Days per week: Not on file     Minutes per session: Not on file     Stress: Not on file   Relationships     Social connections     Talks on phone: Not on file     Gets together: Not on file     Attends Worship service: Not on file     Active member of club or organization: Not on file     Attends meetings of clubs or organizations: Not on file     Relationship status: Not on file     Intimate partner violence     Fear of current or ex partner: Not on file     Emotionally abused: Not on file     Physically abused: Not on file     Forced sexual activity: Not on file   Other Topics Concern     Parent/sibling w/ CABG, MI or angioplasty before 65F 55M? 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              Review Of Systems  Skin: As above  Eyes: negative  Ears/Nose/Throat: negative  Respiratory: No shortness of breath, dyspnea on exertion, cough, or hemoptysis  Cardiovascular: negative  Gastrointestinal: negative  Genitourinary: negative  Musculoskeletal: negative  Neurologic: negative  Psychiatric: negative  Hematologic/Lymphatic/Immunologic: negative  Endocrine: negative      O:   Alert & Orientedx3, Mood & Affect wnl,    General appearance normal   Alert, oriented and in no acute distress     Photos: Video: redness around temple and mouth   Brown colored papule on the right forehead      Pulm: Breathing Normal, talking in normal sentences, no shortness of breath during conversation    Neuro/Psych: Orientation:Alert and Orientedx3 ; Mood/Affect:normal ; no anxiety or depression       A/P:  1. Acne Rosacea - advised on diagnosis and treatment options. Discussed chronic condition of unknown etiology. Discussed anti-inflammatories, sunscreen, PO and topical medications.   --Start metrogel BID  --Start doxycycline 100 mg BID x 2 months. Advised to take with food. Discussed risk of GI upset, esophagitis and photosensitivity.  2. Skin lesions; too difficult to assess via video. Will see these in the office at follow up visit.     BENIGN LESIONS DISCUSSED WITH PATIENT:  I discussed the specifics of tumor, prognosis, and genetics of benign lesions.  I explained that treatment of these lesions would be purely cosmetic and not medically neccessary.  I discussed with patient different removal options including excision, cautery and /or laser.      Nature and genetics of benign skin lesions dicussed with patient.  Signs and Symptoms of skin cancer discussed with patient.  ABCDEs of melanoma reviewed with patient.  Patient  encouraged to perform monthly skin exams.  UV precautions reviewed with patient.  Skin care regimen reviewed with patient: Eliminate harsh soaps, i.e. Dial, zest, irsih spring; Mild soaps such as Cetaphil or Dove sensitive skin, avoid hot or cold showers, aggressive use of emollients including vanicream, cetaphil or cerave discussed with patient.    Risks of non-melanoma skin cancer discussed with patient   Return to clinic 4 weeks    Teledermatology information:  - Location of patient: home  - Location of teledermatologist: [place]   - Reason teledermatology is appropriate: of National Emergency Regarding Coronavirus disease (COVID 19) Outbreak  - The patient's condition can safely be assessed using telemedicine: yes  - Method of transmission: store and forward teledermatology  - Image quality and interpretability: limited   - Physician has received verbal consent for a Video/Photos Visit from the patient? Yes  - In-person dermatology visit recommendation: yes - for physician visit  - Date of images:   - Length of call: 17 minutes  - Date of report: 05/21/20

## 2020-06-18 ENCOUNTER — OFFICE VISIT (OUTPATIENT)
Dept: DERMATOLOGY | Facility: CLINIC | Age: 55
End: 2020-06-18
Payer: COMMERCIAL

## 2020-06-18 VITALS — DIASTOLIC BLOOD PRESSURE: 62 MMHG | SYSTOLIC BLOOD PRESSURE: 107 MMHG | OXYGEN SATURATION: 100 % | HEART RATE: 72 BPM

## 2020-06-18 DIAGNOSIS — I78.1 TELANGIECTASIA: ICD-10-CM

## 2020-06-18 DIAGNOSIS — D18.01 ANGIOMA OF SKIN: ICD-10-CM

## 2020-06-18 DIAGNOSIS — L71.9 ROSACEA: Primary | ICD-10-CM

## 2020-06-18 PROCEDURE — 99213 OFFICE O/P EST LOW 20 MIN: CPT | Performed by: PHYSICIAN ASSISTANT

## 2020-06-18 NOTE — PROGRESS NOTES
HPI:   Chief complaints: Paulette Flores is a 55 year old female who presents for recheck of rosacea. She notes much improvement with metrogel. She stopped the doxycycline due to sun sensitivity and it was too difficult for her to keep covered during the warm weather.   Additional concern: red spot on the upper cheek and forehead present for over 1 year. Not itchy nor painful.   Also would like to discuss treatment for thin lips.   Condition present for:  years.   Previous treatments include: as above    Review Of Systems  Eyes: negative  Ears/Nose/Throat: negative  Respiratory: No shortness of breath, dyspnea on exertion, cough, or hemoptysis  Cardiovascular: negative  Gastrointestinal: negative  Genitourinary: negative  Musculoskeletal: negative  Neurologic: negative  Psychiatric: negative  Skin: positive for lesions, rosacea      PHYSICAL EXAM:    /62 (BP Location: Right arm, Patient Position: Sitting, Cuff Size: Adult Large)   Pulse 72   SpO2 100%   Skin exam performed as follows: Type 2 skin. Mood appropriate  Alert and Oriented X 3. Well developed, well nourished in no distress.  General appearance: Normal  Head including face: Normal  Eyes: conjunctiva and lids: Normal  Mouth: Lips, teeth, gums: Normal  Neck: Normal  Chest-breast/axillae: Normal  Back: Normal  Spleen and liver: Normal  Cardiovascular: Exam of peripheral vascular system by observation for swelling, varicosities, edema: Normal  Genitalia: groin, buttocks: Normal  Extremities: digits/nails (clubbing): Normal  Eccrine and Apocrine glands: Normal  Right upper extremity: Normal  Left upper extremity: Normal  Right lower extremity: Normal  Left lower extremity: Normal  Skin: Scalp and body hair: See below    1. Skin clear  2. Vascular papule ont he right upper cheek 2 mm in size; fine telangiectasias on nose and cheeks      ASSESSMENT/PLAN:     1. Acne rosacea - doing well on metrogel only; stopped doxycycline due to sun sensitivity. Advised  to continue this indefinitely.   2. Angioma on the right upper cheek along with telangiectasias on the nose - discussed IPL laser for treatment if these. Advised that the angioma will likely need multiple treatments. Advised on cosmetic cost of $250 for treatment of angioma and nose; if the angioma needs subsequent treatments discussed cost of $100 per treatment.   3. Discussed dermal filler for lips; she is bothered that her lips are becoming thinner, both on the top and the bottom. Advised on need for 1-2 syringes at a cosmetic charge of $600-1200.         Follow-up: yearly/PRN sooner  CC:   Scribed By: Inocencia Davis, MS, PA-C

## 2020-06-18 NOTE — NURSING NOTE
"Initial /62 (BP Location: Right arm, Patient Position: Sitting, Cuff Size: Adult Large)   Pulse 72   SpO2 100%  Estimated body mass index is 32.01 kg/m  as calculated from the following:    Height as of 9/10/19: 1.581 m (5' 2.25\").    Weight as of 11/25/19: 80 kg (176 lb 6.4 oz). .      "

## 2020-06-18 NOTE — LETTER
6/18/2020         RE: Paulette Flores  [address]  [place] [address]        Dear Colleague,    Thank you for referring your patient, Paulette Flores, to the [facility]. Please see a copy of my visit note below.    HPI:   Chief complaints: Paulette Flores is a 55 year old female who presents for recheck of rosacea. She notes much improvement with metrogel. She stopped the doxycycline due to sun sensitivity and it was too difficult for her to keep covered during the warm weather.   Additional concern: red spot on the upper cheek and forehead present for over 1 year. Not itchy nor painful.   Also would like to discuss treatment for thin lips.   Condition present for:  years.   Previous treatments include: as above    Review Of Systems  Eyes: negative  Ears/Nose/Throat: negative  Respiratory: No shortness of breath, dyspnea on exertion, cough, or hemoptysis  Cardiovascular: negative  Gastrointestinal: negative  Genitourinary: negative  Musculoskeletal: negative  Neurologic: negative  Psychiatric: negative  Skin: positive for lesions, rosacea      PHYSICAL EXAM:    /62 (BP Location: Right arm, Patient Position: Sitting, Cuff Size: Adult Large)   Pulse 72   SpO2 100%   Skin exam performed as follows: Type 2 skin. Mood appropriate  Alert and Oriented X 3. Well developed, well nourished in no distress.  General appearance: Normal  Head including face: Normal  Eyes: conjunctiva and lids: Normal  Mouth: Lips, teeth, gums: Normal  Neck: Normal  Chest-breast/axillae: Normal  Back: Normal  Spleen and liver: Normal  Cardiovascular: Exam of peripheral vascular system by observation for swelling, varicosities, edema: Normal  Genitalia: groin, buttocks: Normal  Extremities: digits/nails (clubbing): Normal  Eccrine and Apocrine glands: Normal  Right upper extremity: Normal  Left upper extremity: Normal  Right lower extremity: Normal  Left lower extremity: Normal  Skin: Scalp and body hair: See  below    1. Skin clear  2. Vascular papule ont he right upper cheek 2 mm in size; fine telangiectasias on nose and cheeks      ASSESSMENT/PLAN:     1. Acne rosacea - doing well on metrogel only; stopped doxycycline due to sun sensitivity. Advised to continue this indefinitely.   2. Angioma on the right upper cheek along with telangiectasias on the nose - discussed IPL laser for treatment if these. Advised that the angioma will likely need multiple treatments. Advised on cosmetic cost of $250 for treatment of angioma and nose; if the angioma needs subsequent treatments discussed cost of $100 per treatment.   3. Discussed dermal filler for lips; she is bothered that her lips are becoming thinner, both on the top and the bottom. Advised on need for 1-2 syringes at a cosmetic charge of $600-1200.         Follow-up: yearly/PRN sooner  CC:   Scribed By: Inocencia Davis, MS, BEV        Again, thank you for allowing me to participate in the care of your patient.        Sincerely,        Inocencia Davis PA-C

## 2020-06-23 NOTE — PROGRESS NOTES
Chief Complaint   Patient presents with     Ent Problem     Check ears/hearing/check right nostril- issue when she had COVID testing done- ?swollen     History of Present Illness   Paulette Flores is a 55 year old female who presents for nose and sinus evaluation. The patient has a history of chronic sinusitis.  She is previously undergone endoscopic sinus surgery.  Overall her nose is been doing very well.  She denies any significant nasal obstruction, nasal congestion, rhinorrhea, postnasal drainage.  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  Initially when she was tested, they had trouble with the swab in the right nose and it was sore for a little while after the swab.  She feels like it slowly improving.  She not use any medications in her nose currently.    The patient underwent a sinus CT on 2/12/2018.  My review of the sinus CT shows pan-sinonasal mucosal thickening without air-fluid levels.  There is evidence of previous maxillary antrostomies and ethmoidectomies.  Patient has a persistent fairly significant rightward anterior superior septal deviation.    From an ear standpoint, the patient reports stable hearing in the left ear.  She is had multiple surgeries for what sounds like was patulous eustachian tube initially.  She did developed middle ear pathology and what sounds like retraction or tympanic membrane perforation.  She states has had 6 surgeries in her ear.  The most recent surgery was over 15 years ago.  Patient denies any otalgia, otorrhea, bloody otorrhea, dizziness, tinnitus, vertigo.      Past Medical History  Patient Active Problem List   Diagnosis     Conductive hearing loss in left ear     DDD (degenerative disc disease), lumbar     Decreased GFR     Current Medications     Current Outpatient Medications:      multivitamin, therapeutic with minerals (MULTI-VITAMIN) TABS tablet, Take 1 tablet by mouth daily, Disp: , Rfl:      Allergies  Allergies   Allergen Reactions     Percocet [Oxycodone-Acetaminophen] Difficulty breathing     Throat closed     Vicodin [Hydrocodone-Acetaminophen] Nausea and Vomiting       Social History   Social History     Socioeconomic History     Marital status:      Spouse name: Not on file     Number of children: Not on file     Years of education: Not on file     Highest education level: Not on file   Occupational History     Not on file   Social Needs     Financial resource strain: Not on file     Food insecurity     Worry: Not on file     Inability: Not on file     Transportation needs     Medical: Not on file     Non-medical: Not on file   Tobacco Use     Smoking status: Current Some Day Smoker     Packs/day: 0.10     Years: 16.00     Pack years: 1.60     Types: Cigarettes     Smokeless tobacco: Never Used     Tobacco comment: trying to quit/cut down   Substance and Sexual Activity     Alcohol use: No     Alcohol/week: 0.0 standard drinks     Drug use: No     Sexual activity: Not on file   Lifestyle     Physical activity     Days per week: Not on file     Minutes per session: Not on file     Stress: Not on file   Relationships     Social connections     Talks on phone: Not on file     Gets together: Not on file     Attends Denominational service: Not on file     Active member of club or organization: Not on file     Attends meetings of clubs or organizations: Not on file     Relationship status: Not on file     Intimate partner violence     Fear of current or ex partner: Not on file     Emotionally abused: Not on file     Physically abused: Not on file     Forced sexual activity: Not on file   Other Topics Concern     Parent/sibling w/ CABG, MI or angioplasty before 65F 55M? 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    Physical Exam  /80 (BP Location: Right arm, Patient Position: Sitting, Cuff Size: Adult Regular)   Pulse 64   Temp 97.5  F (36.4  C) (Tympanic)   Ht 1.581 m (5' 2.25\")   Wt 77.1 kg (170 lb)   BMI 30.84 kg/m    GENERAL: Patient is a pleasant, cooperative 55 year old female in no acute distress.  HEAD: Normocephalic, atraumatic.  Hair and scalp are normal.  EYES: Pupils are equal, round, reactive to light and accommodation.  Extraocular movements are intact.  The sclera nonicteric without injection.  The extraocular structures are normal.  EARS: See below.   NOSE: Nares are patent.  Nasal mucosa is pink and moist.  Very slight right anterior superior nasal septal deviation.  No nasal cavity mass, polyps, or mucopurulence on anterior rhinoscopy..  NEUROLOGIC: Cranial nerves II through XII are grossly intact.  Voice is strong.  Patient is House-Brackmann I/VI bilaterally.  CARDIOVASCULAR: Extremities are warm and well-perfused.  No significant peripheral edema.  RESPIRATORY: Patient has nonlabored breathing without cough, wheeze, stridor.  PSYCHIATRIC: Patient is alert and oriented.  Mood and affect appear normal.  SKIN: Warm and dry.  No scalp, face, or neck lesions noted.    Physical Exam and Procedure    EARS: Normal shape and symmetry.  No tenderness when palpating the mastoid or tragal areas bilaterally.  The ears were then examined under the otic binocular microscope to perform cerumen removal.  Otoscopic exam on the right reveals a moderate amount of cerumen.  The cerumen was removed with a curette and alligator forceps.  The right tympanic membrane was round, intact without evidence of effusion.  No retraction, granulation, drainage, or evidence of cholesteatoma.      Attention was turned to the left ear.  Otoscopic exam on the left reveals a moderate " amount of cerumen.  The cerumen was removed with a curette and alligator forceps.  The left tympanic membrane was round, intact without evidence of effusion.  There definitely is some tympanosclerosis and scarring of the tympanic membrane.  There is no significant retraction.  No granulation, drainage, or evidence of cholesteatoma.      Procedure: Flexible Nasal Endoscopy  Indication: History of endoscopic sinus surgery    To best visualize the sinonasal anatomy and due to the chief complaint and HPI, I proceeded with flexible fiberoptic nasal endoscopy.  The bilateral nasal cavities were anesthetized and decongested with a mixture of lidocaine and neosynephrine.  The bilateral nasal cavities were then examined using flexible fiberoptic nasal endoscope.  The right nasal cavity was without masses, polyps, or mucopurulence.  The patient has a widely patent right maxillary antrostomy with some slight mucoid drainage.  The right middle turbinate and middle meatus was normal in appearance.  The sphenoethmoid recess was clear.  The left nasal cavity was without masses, polyps, or mucopurulence.  The patient had a widely patent left maxillary antrostomy with a slight amount of mucoid drainage.  The left middle turbinate and middle meatus was normal in appearance.  The sphenoethmoid recess was clear.  The sinonasal mucosa appeared normal.  The nasal septum deviates slightly to the right anteriorly and superiorly.  The nasopharynx had a normal appearance with normal Eustachian tube openings and fossa of Rosenmuller bilaterally. Minimal adenoid tissue.  The scope was removed.  The patient tolerated the procedure well.    Audiogram  The patient underwent an audiogram performed today.  My review of the audiogram shows normal hearing in the right ear and mild sloping to severe sensorineural hearing loss in the left ear.  Pure-tone average is 11 dB on the right and 33 dB on the left.  Speech reception threshhold is 10 dB on the  right and 30 dB on the left.  The patient had 96% word recognition on the right and 92% word recognition on the left.  The patient had a type A tympanogram on the right and a type A tympanogram on the left.     Assessment and Plan     ICD-10-CM    1. Conductive hearing loss of left ear with unrestricted hearing of right ear  H90.12 NASAL ENDOSCOPY, DIAGNOSTIC     Microscopy, Binocular     AUDIOLOGY ADULT REFERRAL   2. History of ear surgery  Z98.890 NASAL ENDOSCOPY, DIAGNOSTIC     Microscopy, Binocular     AUDIOLOGY ADULT REFERRAL   3. History of endoscopic sinus surgery  Z98.890 NASAL ENDOSCOPY, DIAGNOSTIC     Microscopy, Binocular     AUDIOLOGY ADULT REFERRAL     It was my pleasure seeing Paulette Flores today in clinic.  The patient presents today for ear and sinus follow-up.  From an ear standpoint, her ear is very stable and her hearing is stable.  We would recommend observation.  We briefly discussed amplification for the left ear, for which she would be a candidate.  I would recommend we recheck her hearing in 1 year with a repeat ear examination.  She has problems with ear infection or ear drainage in the interim, she knows to contact me.    From a nose and sinus standpoint, overall her symptoms appear to be well controlled.  She has no significant signs of polyposis or sinus inflammation on endoscopic examination.  I think that the nasal swab injury has resolved as there is no signs of significant problem on endoscopic exam.  I would recommend observation at this point in time.  If she is having sinus issues, I be happy to see her back at any point time.    James Villanueva MD  Department of Otolarygology-Head and Neck Surgery  [facility]

## 2020-06-24 ENCOUNTER — OFFICE VISIT (OUTPATIENT)
Dept: OTOLARYNGOLOGY | Facility: CLINIC | Age: 55
End: 2020-06-24
Payer: COMMERCIAL

## 2020-06-24 ENCOUNTER — OFFICE VISIT (OUTPATIENT)
Dept: AUDIOLOGY | Facility: CLINIC | Age: 55
End: 2020-06-24
Payer: COMMERCIAL

## 2020-06-24 VITALS
HEART RATE: 64 BPM | HEIGHT: 62 IN | SYSTOLIC BLOOD PRESSURE: 134 MMHG | WEIGHT: 170 LBS | TEMPERATURE: 97.5 F | BODY MASS INDEX: 31.28 KG/M2 | DIASTOLIC BLOOD PRESSURE: 80 MMHG

## 2020-06-24 DIAGNOSIS — Z98.890 HISTORY OF EAR SURGERY: ICD-10-CM

## 2020-06-24 DIAGNOSIS — H90.12 CONDUCTIVE HEARING LOSS IN LEFT EAR: Primary | ICD-10-CM

## 2020-06-24 DIAGNOSIS — Z98.890 HISTORY OF ENDOSCOPIC SINUS SURGERY: ICD-10-CM

## 2020-06-24 DIAGNOSIS — H90.12 CONDUCTIVE HEARING LOSS OF LEFT EAR WITH UNRESTRICTED HEARING OF RIGHT EAR: Primary | ICD-10-CM

## 2020-06-24 PROCEDURE — 99213 OFFICE O/P EST LOW 20 MIN: CPT | Mod: 25 | Performed by: OTOLARYNGOLOGY

## 2020-06-24 PROCEDURE — 99207 ZZC NO CHARGE LOS: CPT | Performed by: AUDIOLOGIST

## 2020-06-24 PROCEDURE — 92504 EAR MICROSCOPY EXAMINATION: CPT | Performed by: OTOLARYNGOLOGY

## 2020-06-24 PROCEDURE — 92550 TYMPANOMETRY & REFLEX THRESH: CPT | Performed by: AUDIOLOGIST

## 2020-06-24 PROCEDURE — 92557 COMPREHENSIVE HEARING TEST: CPT | Performed by: AUDIOLOGIST

## 2020-06-24 PROCEDURE — 31231 NASAL ENDOSCOPY DX: CPT | Performed by: OTOLARYNGOLOGY

## 2020-06-24 ASSESSMENT — MIFFLIN-ST. JEOR: SCORE: 1313.33

## 2020-06-24 NOTE — PROGRESS NOTES
This laryngoscopy scope was used on this patient.    Flexible    [employer] Flexible #[id number] Adult  Reina Christine CMA

## 2020-06-24 NOTE — LETTER
6/24/2020         RE: Paulette Florse  [address]  [place] [address]        Dear Colleague,    Thank you for referring your patient, Paulette Flores, to the [facility]. Please see a copy of my visit note below.    Chief Complaint   Patient presents with     Ent Problem     Check ears/hearing/check right nostril- issue when she had COVID testing done- ?swollen     History of Present Illness   Paulette Flores is a 55 year old female who presents for nose and sinus evaluation. The patient has a history of chronic sinusitis.  She is previously undergone endoscopic sinus surgery.  Overall her nose is been doing very well.  She denies any significant nasal obstruction, nasal congestion, rhinorrhea, postnasal drainage.  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  Initially when she was tested, they had trouble with the swab in the right nose and it was sore for a little while after the swab.  She feels like it slowly improving.  She not use any medications in her nose currently.    The patient underwent a sinus CT on 2/12/2018.  My review of the sinus CT shows pan-sinonasal mucosal thickening without air-fluid levels.  There is evidence of previous maxillary antrostomies and ethmoidectomies.  Patient has a persistent fairly significant rightward anterior superior septal deviation.    From an ear standpoint, the patient reports stable hearing in the left ear.  She is had multiple surgeries for what sounds like was patulous eustachian tube initially.  She did developed middle ear pathology and what sounds like retraction or tympanic membrane perforation.  She states has had 6 surgeries in her ear.  The most recent surgery was over 15 years ago.  Patient denies any otalgia, otorrhea, bloody otorrhea, dizziness, tinnitus, vertigo.      Past Medical History  Patient Active Problem List   Diagnosis     Conductive hearing loss in left ear     DDD (degenerative  disc disease), lumbar     Decreased GFR     Current Medications     Current Outpatient Medications:      multivitamin, therapeutic with minerals (MULTI-VITAMIN) TABS tablet, Take 1 tablet by mouth daily, Disp: , Rfl:     Allergies  Allergies   Allergen Reactions     Percocet [Oxycodone-Acetaminophen] Difficulty breathing     Throat closed     Vicodin [Hydrocodone-Acetaminophen] Nausea and Vomiting       Social History   Social History     Socioeconomic History     Marital status:      Spouse name: Not on file     Number of children: Not on file     Years of education: Not on file     Highest education level: Not on file   Occupational History     Not on file   Social Needs     Financial resource strain: Not on file     Food insecurity     Worry: Not on file     Inability: Not on file     Transportation needs     Medical: Not on file     Non-medical: Not on file   Tobacco Use     Smoking status: Current Some Day Smoker     Packs/day: 0.10     Years: 16.00     Pack years: 1.60     Types: Cigarettes     Smokeless tobacco: Never Used     Tobacco comment: trying to quit/cut down   Substance and Sexual Activity     Alcohol use: No     Alcohol/week: 0.0 standard drinks     Drug use: No     Sexual activity: Not on file   Lifestyle     Physical activity     Days per week: Not on file     Minutes per session: Not on file     Stress: Not on file   Relationships     Social connections     Talks on phone: Not on file     Gets together: Not on file     Attends Spiritism service: Not on file     Active member of club or organization: Not on file     Attends meetings of clubs or organizations: Not on file     Relationship status: Not on file     Intimate partner violence     Fear of current or ex partner: Not on file     Emotionally abused: Not on file     Physically abused: Not on file     Forced sexual activity: Not on file   Other Topics Concern     Parent/sibling w/ CABG, MI or angioplasty before 65F 55M? 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    Physical Exam  /80 (BP Location: Right arm, Patient Position: Sitting, Cuff Size: Adult Regular)   Pulse 64   Temp 97.5  F (36.4  C) (Tympanic)   Ht 1.581 m (5' 2.25\")   Wt 77.1 kg (170 lb)   BMI 30.84 kg/m    GENERAL: Patient is a pleasant, cooperative 55 year old female in no acute distress.  HEAD: Normocephalic, atraumatic.  Hair and scalp are normal.  EYES: Pupils are equal, round, reactive to light and accommodation.  Extraocular movements are intact.  The sclera nonicteric without injection.  The extraocular structures are normal.  EARS: See below.   NOSE: Nares are patent.  Nasal mucosa is pink and moist.  Very slight right anterior superior nasal septal deviation.  No nasal cavity mass, polyps, or mucopurulence on anterior rhinoscopy..  NEUROLOGIC: Cranial nerves II through XII are grossly intact.  Voice is strong.  Patient is House-Brackmann I/VI bilaterally.  CARDIOVASCULAR: Extremities are warm and well-perfused.  No significant peripheral edema.  RESPIRATORY: Patient has nonlabored breathing without cough, wheeze, stridor.  PSYCHIATRIC: Patient is alert and oriented.  Mood and affect appear normal.  SKIN: Warm and dry.  No scalp, face, or neck lesions noted.    Physical Exam and Procedure    EARS: Normal shape and symmetry.  No tenderness when palpating the mastoid or tragal areas bilaterally.  The ears were then examined under the otic binocular microscope to perform cerumen removal.  Otoscopic exam on the right reveals a moderate amount of cerumen.  The cerumen was removed with a curette and alligator forceps.  The right " tympanic membrane was round, intact without evidence of effusion.  No retraction, granulation, drainage, or evidence of cholesteatoma.      Attention was turned to the left ear.  Otoscopic exam on the left reveals a moderate amount of cerumen.  The cerumen was removed with a curette and alligator forceps.  The left tympanic membrane was round, intact without evidence of effusion.  There definitely is some tympanosclerosis and scarring of the tympanic membrane.  There is no significant retraction.  No granulation, drainage, or evidence of cholesteatoma.      Procedure: Flexible Nasal Endoscopy  Indication: History of endoscopic sinus surgery    To best visualize the sinonasal anatomy and due to the chief complaint and HPI, I proceeded with flexible fiberoptic nasal endoscopy.  The bilateral nasal cavities were anesthetized and decongested with a mixture of lidocaine and neosynephrine.  The bilateral nasal cavities were then examined using flexible fiberoptic nasal endoscope.  The right nasal cavity was without masses, polyps, or mucopurulence.  The patient has a widely patent right maxillary antrostomy with some slight mucoid drainage.  The right middle turbinate and middle meatus was normal in appearance.  The sphenoethmoid recess was clear.  The left nasal cavity was without masses, polyps, or mucopurulence.  The patient had a widely patent left maxillary antrostomy with a slight amount of mucoid drainage.  The left middle turbinate and middle meatus was normal in appearance.  The sphenoethmoid recess was clear.  The sinonasal mucosa appeared normal.  The nasal septum deviates slightly to the right anteriorly and superiorly.  The nasopharynx had a normal appearance with normal Eustachian tube openings and fossa of Rosenmuller bilaterally. Minimal adenoid tissue.  The scope was removed.  The patient tolerated the procedure well.    Audiogram  The patient underwent an audiogram performed today.  My review of the  audiogram shows normal hearing in the right ear and mild sloping to severe sensorineural hearing loss in the left ear.  Pure-tone average is 11 dB on the right and 33 dB on the left.  Speech reception threshhold is 10 dB on the right and 30 dB on the left.  The patient had 96% word recognition on the right and 92% word recognition on the left.  The patient had a type A tympanogram on the right and a type A tympanogram on the left.     Assessment and Plan     ICD-10-CM    1. Conductive hearing loss of left ear with unrestricted hearing of right ear  H90.12 NASAL ENDOSCOPY, DIAGNOSTIC     Microscopy, Binocular     AUDIOLOGY ADULT REFERRAL   2. History of ear surgery  Z98.890 NASAL ENDOSCOPY, DIAGNOSTIC     Microscopy, Binocular     AUDIOLOGY ADULT REFERRAL   3. History of endoscopic sinus surgery  Z98.890 NASAL ENDOSCOPY, DIAGNOSTIC     Microscopy, Binocular     AUDIOLOGY ADULT REFERRAL     It was my pleasure seeing Paulette Flores today in clinic.  The patient presents today for ear and sinus follow-up.  From an ear standpoint, her ear is very stable and her hearing is stable.  We would recommend observation.  We briefly discussed amplification for the left ear, for which she would be a candidate.  I would recommend we recheck her hearing in 1 year with a repeat ear examination.  She has problems with ear infection or ear drainage in the interim, she knows to contact me.    From a nose and sinus standpoint, overall her symptoms appear to be well controlled.  She has no significant signs of polyposis or sinus inflammation on endoscopic examination.  I think that the nasal swab injury has resolved as there is no signs of significant problem on endoscopic exam.  I would recommend observation at this point in time.  If she is having sinus issues, I be happy to see her back at any point time.    James Villanueva MD  Department of Otolarygology-Head and Neck Surgery  [facility]       Again, thank you for allowing me to  participate in the care of your patient.        Sincerely,        James Villanueva MD

## 2020-06-24 NOTE — NURSING NOTE
"Initial /80 (BP Location: Right arm, Patient Position: Sitting, Cuff Size: Adult Regular)   Pulse 64   Temp 97.5  F (36.4  C) (Tympanic)   Ht 1.581 m (5' 2.25\")   Wt 77.1 kg (170 lb)   BMI 30.84 kg/m   Estimated body mass index is 30.84 kg/m  as calculated from the following:    Height as of this encounter: 1.581 m (5' 2.25\").    Weight as of this encounter: 77.1 kg (170 lb). .    Reina Christine CMA    "

## 2020-06-24 NOTE — PROGRESS NOTES
AUDIOLOGY REPORT    SUBJECTIVE:  Paulette Flores is a 55 year old female who was seen at [facility] for an audiologic evaluation, referred by Dr. Villanueva.  The patient has been seen previously in this clinic for assessment and results indicated a mild left conductive hearing loss. The patient reports she is here today for a routine ear and hearing evaluation. She has a history of numerous left otologic surgeries. She reports no noted problems with the left ear for many years. The patient denies bilateral otalgia and bilateral drainage.     OBJECTIVE:    Otoscopic exam indicates partial obstruction with cerumen in the right ear      Pure Tone Thresholds assessed using conventional audiometry with good  reliability from 250-8000 Hz bilaterally using insert earphones and circumaural headphones     RIGHT:  normal and mild high tone hearing loss    LEFT:    mild, moderate and severe conductive hearing loss    Tympanogram:    RIGHT: normal eardrum mobility    LEFT:   normal eardrum mobility    Reflexes (reported by stimulus ear 1000 Hz):     RIGHT: Ipsilateral is present    RIGHT: Contralateral is absent    LEFT: Ipsilateral is absent    LEFT: Contralateral is absent    Speech Reception Threshold:    RIGHT: 10 dB HL    LEFT:   30 dB HL  Word Recognition Score:     RIGHT: 96% at 50 dB HL using NU-6 recorded word list.    LEFT:   92% at 70 dB HL using NU-6 recorded word list.      ASSESSMENT:   Mild high tone dip in hearing (1171-8062 Hz) in the right ear with a mild to moderate to severe conductive hearing loss in the left ear.     Today s results were discussed with the patient in detail.     PLAN: It is recommended that the patient be seen by Dr. Villanueva for medical evaluation of their ears and hearing evaluation.  Patient was counseled regarding hearing loss and impact on communication.  Please call this clinic with questions regarding these results or recommendations.        Nidia Landers M.A.  F-AAA  Clinical audiologist [place] [facility] [id number]  6/24/2020

## 2020-06-24 NOTE — PATIENT INSTRUCTIONS
Per physician instructions      If you have questions or concerns on any instructions given to you by your provider today or if you need to schedule an appointment, you can reach us at [phone].

## 2020-07-13 ENCOUNTER — TELEPHONE (OUTPATIENT)
Dept: FAMILY MEDICINE | Facility: CLINIC | Age: 55
End: 2020-07-13

## 2020-07-13 NOTE — LETTER
[facility]  [address] [place] [address]  Phone: [phone]    July 14, 2020        Paulette Flores  [address]  [place] [address]          To whom it may concern:    RE: Paulette J Sandra    Patient has chronic pain and spasms of the right fifth digit related to a hyperabduction injury in 2008. Using an ergonomic mouse helps prevent/alleviate pain. Therefore, I recommend she continue using this type of mouse.     Please contact me for questions or concerns.      Sincerely,        Laura Sarah PA-C

## 2020-07-13 NOTE — TELEPHONE ENCOUNTER
Patient would like doctors note stating she needs an ergonomic mouse for work due to a work related injury 15 years ago. States her mouse went out and she is needing a note for a new mouse.     Please fax to her work at [phone]    Trina Sellers TC/Pt Rep

## 2020-07-13 NOTE — TELEPHONE ENCOUNTER
See 1/11/2008 OV from [facility] provider    DOI: 1/11/2008   Mechanism of Injury: bathroom stall door at work caught her right little finger causing a hyperabduction injury.  Dx: PAIN IN LIMB (primary encounter diagnosis)      Spoke with patient, states she types a lot at work and has spasms in right pinky finger. She uses ergonomic mouse to keep hand up higher which helps with the pain and spasms.

## 2020-07-14 NOTE — TELEPHONE ENCOUNTER
Patient requested letter to be faxed on Thursday as that is when she works again. Letter on TC desk.

## 2020-07-17 NOTE — TELEPHONE ENCOUNTER
Per patient, she did not receive fax. Please re-fax [phone].     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     Trina Sellers TC/Pt Rep

## 2020-08-06 ENCOUNTER — OFFICE VISIT (OUTPATIENT)
Dept: DERMATOLOGY | Facility: CLINIC | Age: 55
End: 2020-08-06
Payer: COMMERCIAL

## 2020-08-06 VITALS — DIASTOLIC BLOOD PRESSURE: 76 MMHG | HEART RATE: 76 BPM | SYSTOLIC BLOOD PRESSURE: 122 MMHG | OXYGEN SATURATION: 96 %

## 2020-08-06 DIAGNOSIS — Z41.1 ELECTIVE PROCEDURE FOR UNACCEPTABLE COSMETIC APPEARANCE: Primary | ICD-10-CM

## 2020-08-06 PROCEDURE — 96999 UNLISTED SPEC DERM SVC/PX: CPT | Performed by: PHYSICIAN ASSISTANT

## 2020-08-06 NOTE — NURSING NOTE
Chief Complaint   Patient presents with     Rosacea     ipl       Vitals:    08/06/20 1233   BP: 122/76   Pulse: 76   SpO2: 96%     Wt Readings from Last 1 Encounters:   06/24/20 77.1 kg (170 lb)       Rozina Baez LPN.................8/6/2020

## 2020-08-06 NOTE — PATIENT INSTRUCTIONS
You have been treated with IPL. The treated area is very delicate and should be treated gently. Many patients develop immediate redness and swelling of the treated area which feels like a sunburn. Occasionally, blisters and crusts may form. Please read and follow these instructions.     Quick warm showers are recommended. If areas are treated other than the facial area, hot baths are not advised for 24 hours.    Cold compresses (ice packs) should be applied immediately after treatment and may reduce postoperative pain and minimize swelling.    Wound care: wash the treated area with a mild soap twice daily. Emollients such as aquaphor, Aloe Vera gel or petrolatum ointments can be soothing when applied 2-3 times daily until healing is complete. If blistering occurs, a topical antibiotic ointment applied twice daily is helpful. A bandage is not required, however, applying a non-stick bandage or band-aid may help protect a particular area from being irritated by clothing or jewelry.    If crusts or scabs develop, be careful not to scratch or pick at the treated area. Allow them to fall off on their own.    Analgesics such as acetaminophen or ibuprofen may be taken if necessary to reduce discomfort. Additional application of ice or cool compresses in the first 24-36 hours may also be helpful.    Make-up may be applied on the next day unless blistering or crusts developed. Crusts usually disappear within 1 week. A good mineral, non-comedogenic makeup such as Ramandeep Iredale is recommended.    Any degree of suntan will make the laser treatment less effective and increase your chance of having adverse effects such as blisters and scarring. It is absolutely necessary that you protect the area with a sunscreen that blocks UVA and UVB rays (SPF 15-30) when going outdoors (especially if you require future treatments). This should be done 4 weeks before and after treatment.

## 2020-08-06 NOTE — PROGRESS NOTES
HPI:   Chief complaints: Paulette Flores is a 55 year old female who presents for treatment of nasal telangiectasias and angioma on the cheek with IPL      Review Of Systems  Eyes: negative  Ears/Nose/Throat: negative  Respiratory: No shortness of breath, dyspnea on exertion, cough, or hemoptysis  Cardiovascular: negative  Gastrointestinal: negative  Genitourinary: negative  Musculoskeletal: negative  Neurologic: negative  Psychiatric: negative  Skin: positive for vessels      PHYSICAL EXAM:    /76   Pulse 76   SpO2 96%   Skin exam performed as follows: Type 2 skin. Mood appropriate  Alert and Oriented X 3. Well developed, well nourished in no distress.  General appearance: Normal  Head including face: Normal  Eyes: conjunctiva and lids: Normal  Mouth: Lips, teeth, gums: Normal  Neck: Normal  Chest-breast/axillae: Normal  Back: Normal  Spleen and liver: Normal  Cardiovascular: Exam of peripheral vascular system by observation for swelling, varicosities, edema: Normal  Genitalia: groin, buttocks: Normal  Extremities: digits/nails (clubbing): Normal  Eccrine and Apocrine glands: Normal  Right upper extremity: Normal  Left upper extremity: Normal  Right lower extremity: Normal  Left lower extremity: Normal  Skin: Scalp and body hair: See below    1. Telangiectasias around ala bilaterally; angioma on the right cheek    ASSESSMENT/PLAN:     1. Facial telangiectasias and angioma - IPL laser performed to face. 560nm at 18-22 mJ.  Approximately 5 passes for angioma; 1-2 for nose. Patient tolerated well with no complications. Warned to be diligent with SPF for at least the next 2 months to avoid dyspigmentation. Allow 3-4 weeks for full effect; follow up in any residual areas. $250 cosmetic charge.   2. Paulette Flores to follow up with Primary Care provider regarding elevated blood pressure.        Follow-up: 1 month  CC:   Scribed By: Inocencia Davis, MS, PAREGAN

## 2020-11-22 ENCOUNTER — HEALTH MAINTENANCE LETTER (OUTPATIENT)
Age: 55
End: 2020-11-22

## 2020-12-21 ENCOUNTER — TELEPHONE (OUTPATIENT)
Dept: ORTHOPEDICS | Facility: CLINIC | Age: 55
End: 2020-12-21

## 2020-12-21 DIAGNOSIS — M77.41 METATARSALGIA OF BOTH FEET: Primary | ICD-10-CM

## 2020-12-21 DIAGNOSIS — M77.42 METATARSALGIA OF BOTH FEET: Primary | ICD-10-CM

## 2020-12-21 NOTE — TELEPHONE ENCOUNTER
Paulette was called back by RN and voicemail was left that her updated orthotic order has been entered, bilateral custom orthotics with metatarsal pads for bilateral metatarsalgia.  Awa Healy RN

## 2020-12-21 NOTE — TELEPHONE ENCOUNTER
[facility] Call [facility]    Phone Message    May a detailed message be left on voicemail: yes     Reason for Call: Order(s): Other:   Reason for requested: needs new orthotics  Date needed: today  Provider name: Dr. Carlisle    Orthotics dept told her she needs new order.      Action Taken: Message routed to:  [facility]o    Travel Screening: Not Applicable

## 2020-12-28 ENCOUNTER — OFFICE VISIT (OUTPATIENT)
Dept: FAMILY MEDICINE | Facility: CLINIC | Age: 55
End: 2020-12-28
Payer: COMMERCIAL

## 2020-12-28 ENCOUNTER — NURSE TRIAGE (OUTPATIENT)
Dept: NURSING | Facility: CLINIC | Age: 55
End: 2020-12-28

## 2020-12-28 ENCOUNTER — ANCILLARY PROCEDURE (OUTPATIENT)
Dept: GENERAL RADIOLOGY | Facility: CLINIC | Age: 55
End: 2020-12-28
Attending: PHYSICIAN ASSISTANT
Payer: COMMERCIAL

## 2020-12-28 VITALS
DIASTOLIC BLOOD PRESSURE: 74 MMHG | SYSTOLIC BLOOD PRESSURE: 129 MMHG | RESPIRATION RATE: 20 BRPM | HEART RATE: 74 BPM | BODY MASS INDEX: 36.47 KG/M2 | TEMPERATURE: 97.6 F | WEIGHT: 201 LBS | OXYGEN SATURATION: 99 %

## 2020-12-28 DIAGNOSIS — M54.50 ACUTE LEFT-SIDED LOW BACK PAIN WITHOUT SCIATICA: ICD-10-CM
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
## 2020-12-28 DIAGNOSIS — R10.9 LEFT FLANK PAIN: Primary | ICD-10-CM

## 2020-12-28 DIAGNOSIS — R10.9 LEFT FLANK PAIN: ICD-10-CM

## 2020-12-28 LAB
ALBUMIN UR-MCNC: NEGATIVE MG/DL
APPEARANCE UR: CLEAR
BILIRUB UR QL STRIP: NEGATIVE
COLOR UR AUTO: YELLOW
CREAT SERPL-MCNC: 0.96 MG/DL (ref 0.52–1.04)
GFR SERPL CREATININE-BSD FRML MDRD: 66 ML/MIN/{1.73_M2}
GLUCOSE UR STRIP-MCNC: NEGATIVE MG/DL
HGB BLD-MCNC: 14.4 G/DL (ref 11.7–15.7)
HGB UR QL STRIP: NEGATIVE
KETONES UR STRIP-MCNC: NEGATIVE MG/DL
LEUKOCYTE ESTERASE UR QL STRIP: NEGATIVE
NITRATE UR QL: NEGATIVE
PH UR STRIP: 5 PH (ref 5–7)
SOURCE: NORMAL
SP GR UR STRIP: 1.01 (ref 1–1.03)
UROBILINOGEN UR STRIP-ACNC: 0.2 EU/DL (ref 0.2–1)

## 2020-12-28 PROCEDURE — 71101 X-RAY EXAM UNILAT RIBS/CHEST: CPT | Mod: LT | Performed by: RADIOLOGY

## 2020-12-28 PROCEDURE — 81003 URINALYSIS AUTO W/O SCOPE: CPT | Performed by: PHYSICIAN ASSISTANT

## 2020-12-28 PROCEDURE — 73502 X-RAY EXAM HIP UNI 2-3 VIEWS: CPT | Performed by: RADIOLOGY

## 2020-12-28 PROCEDURE — 82565 ASSAY OF CREATININE: CPT | Performed by: PHYSICIAN ASSISTANT

## 2020-12-28 PROCEDURE — 36415 COLL VENOUS BLD VENIPUNCTURE: CPT | Performed by: PHYSICIAN ASSISTANT

## 2020-12-28 PROCEDURE — 85018 HEMOGLOBIN: CPT | Performed by: PHYSICIAN ASSISTANT

## 2020-12-28 PROCEDURE — 99213 OFFICE O/P EST LOW 20 MIN: CPT | Performed by: PHYSICIAN ASSISTANT

## 2020-12-28 NOTE — PROGRESS NOTES
Subjective     Paulette Flores is a 55 year old female who presents to clinic today for the following health issues:    HPI         Back Pain  Shoveled 5 days ago and fell sitting down afterwards. Hit her left back on brick retaining wall.  Constipated since, but responding to laxatives. No other injuries. Breathing worsens pain, but no sob.   Onset/Duration: 5 days  Description:   Location of pain: over kidney area  Character of pain: burning, intermittent  Pain radiation: none  New numbness or weakness in legs, not attributed to pain: no   Intensity: moderate  Progression of Symptoms: improving  History:   Specific cause: fell  Pain interferes with job:  no   History of back problems: no prior back problems  Any previous MRI or X-rays: None  Sees a specialist for back pain: No  Alleviating factors:   Improved by: cold pack, Ibu, Tylenol    Precipitating factors:  Worsened by: Bending and Lying Flat  Therapies tried and outcome: Ibu, Tylenol and cold pack    Accompanying Signs & Symptoms:  Risk of Fracture: Recent history of trauma or blunt force  Risk of Cauda Equina: Unexplained bowel or bladder changes  Risk of Infection: None  Risk of Cancer: None  Risk of Ankylosing Spondylitis: Onset at age <35, male, AND morning back stiffness no    Review of Systems   Constitutional, HEENT, cardiovascular, pulmonary, gi and gu systems are negative, except as otherwise noted.      Objective    /74   Pulse 74   Temp 97.6  F (36.4  C) (Tympanic)   Resp 20   Wt 91.2 kg (201 lb)   SpO2 99%   BMI 36.47 kg/m    Body mass index is 36.47 kg/m .  Physical Exam  Vitals signs and nursing note reviewed.   Constitutional:       General: She is not in acute distress.     Appearance: She is not ill-appearing or diaphoretic.   HENT:      Head: Normocephalic and atraumatic.      Mouth/Throat:      Mouth: Mucous membranes are moist.   Eyes:      Conjunctiva/sclera: Conjunctivae normal.   Cardiovascular:      Rate and Rhythm:  Normal rate and regular rhythm.      Heart sounds: Normal heart sounds. No murmur. No friction rub. No gallop.    Pulmonary:      Effort: Pulmonary effort is normal. No respiratory distress.      Breath sounds: Normal breath sounds. No stridor. No wheezing, rhonchi or rales.   Abdominal:      General: Bowel sounds are normal. There is no distension.      Palpations: Abdomen is soft. There is no mass.      Tenderness: There is no abdominal tenderness. There is no guarding or rebound.      Hernia: No hernia is present.   Musculoskeletal:      Comments: Midline spinal tenderness in the left SI and posterior pelvic rim as well as the left lower flank.  No overlying signs of trauma or infection.    Skin:     General: Skin is warm and dry.   Neurological:      General: No focal deficit present.      Mental Status: She is alert. Mental status is at baseline.      Comments: Able to toe lift, heel walk and knee bend.   Psychiatric:         Mood and Affect: Mood normal.         Behavior: Behavior normal.          Results for orders placed or performed in visit on 12/28/20   *UA reflex to Microscopic and Culture ([place] [facility] (except [place] and [place])     Status: None    Specimen: Midstream Urine   Result Value Ref Range    Color Urine Yellow     Appearance Urine Clear     Glucose Urine Negative NEG^Negative mg/dL    Bilirubin Urine Negative NEG^Negative    Ketones Urine Negative NEG^Negative mg/dL    Specific Gravity Urine 1.010 1.003 - 1.035    Blood Urine Negative NEG^Negative    pH Urine 5.0 5.0 - 7.0 pH    Protein Albumin Urine Negative NEG^Negative mg/dL    Urobilinogen Urine 0.2 0.2 - 1.0 EU/dL    Nitrite Urine Negative NEG^Negative    Leukocyte Esterase Urine Negative NEG^Negative    Source Midstream Urine      Hgb, Cr and XR rad reads pending.    Assessment & Plan   Problem List Items Addressed This Visit     None      Visit Diagnoses     Left flank pain    -  Primary    Relevant Orders    XR Ribs  & Chest Left G/E 3 Views    Hemoglobin (Completed)    Creatinine (Completed)    *UA reflex to Microscopic and Culture ([facility] (except [place] and [place]) (Completed)    [facility] REFERRAL    Fall, initial encounter        Relevant Orders    XR Ribs & Chest Left G/E 3 Views    Hemoglobin (Completed)    Creatinine (Completed)    *UA reflex to Microscopic and Culture ([facility] (except [place] and [place]) (Completed)    XR Pelvis and Hip Left 1 View    [facility] REFERRAL    Acute left-sided low back pain without sciatica        Relevant Orders    XR Pelvis and Hip Left 1 View    [facility] REFERRAL          Paulette Flores is a 55 year old female with no significant PMH presents c/o left flank and low back pain status post mechanical fall. DDx sprain/strain/fracture/contusion/dislocation/others. XR is negative for fracture or pneumothorax per my read with rad read pending. No hematuria, doubt kidney contusion or other internal organ blunt trauma, though Hgb and CR pending. Impression is likely contusion. Will tx with analgesics otc, RICE/heat and primary care FU in 2 weeks if not improving. PT ordered as well.     Complete history and physical exam as above. AF with normal VS.    DDx and Dx discussed with and explained to the pt to their satisfaction.  All questions were answered at this time. Pt expressed understanding of and agreement with this dx, tx, and plan. No further workup warranted and standard medication warnings given. I have given the patient a list of pertinent indications for re-evaluation. Will go to the Emergency Department if symptoms worsen or new concerning symptoms arise. Patient left in no apparent distress.     See Patient Instructions  Return in about 1 month (around 1/28/2021) for ER evaluation if not improving or anytime if worse.    MUKUL Javier  [facility]  [place]

## 2020-12-28 NOTE — PATIENT INSTRUCTIONS
Kera Flores,    Thank you for allowing [facility] to manage your care.    I ordered some blood work, please go to the laboratory to get your laboratory studies.    I ordered some xrays, please go to our radiology department to get your xrays.    For your pain, please use Ibuprofen 400mg four times daily with food. Between ibuprofen doses, you may use Tylenol 650mg.     Max acetaminophen (Tylenol) 4,000mg/24 hours  Max ibuprofen 3,200mg/24 hours    I made a physical therapy referral, they will be calling in approximately 1 week to set up your appointment.  If you do not hear from them, please call the specialty number on your after visit summary.     Please allow 1-2 business days for our office to contact you in regards to your laboratory/radiological studies.  If not done so, I encourage you to login into [employer] ([email or web address] to review your results as well.     If you have any questions or concerns, please feel free to call us at [phone]    Sincerely,    Alan Mena PA-C    Did you know?      You can schedule a video visit for follow-up appointments as well as future appointments for certain conditions.  Please see the below link.     [email or web address]    If you have not already done so,  I encourage you to sign up for [email or web address] ([email or web address]).  This will allow you to review your results, securely communicate with a provider, and schedule virtual visits as well.      Patient Education     Back Contusion  You have a contusion to your back. A contusion is also called a bruise. There is swelling and some bleeding under the skin. The skin may be purplish. You may have muscle aching and stiffness in the area of the bruise. There are no broken bones.  Contusions heal on their own, without further treatment. However, pain and skin discoloration may take weeks to months to go away.   Home care    Rest. Avoid heavy  lifting, strenuous exertion, or any activity that causes pain.    Ice the area to reduce pain and swelling. Put ice cubes in a plastic bag or use a cold pack. (Wrap the cold source in a thin towel. Don't place it directly on your skin.) Ice the injured area for 20 minutes every 1 to 2 hours the first day. Continue with ice packs 3 to 4 times a day for the next 2 days, then as needed for the relief of pain and swelling.    Take any prescribed pain medicine. If none was prescribed, take acetaminophen, ibuprofen, or naproxen to control pain, unless you have other medical conditions that prevent taking these medicines. If you are unsure about medicines, ask your healthcare provider before you leave the hospital.  Follow-up care  Follow up with your healthcare provider, or as directed. Call if you are not better in 1 to 2 weeks.  When to seek medical advice  Call your healthcare provider for any of the following:    New or worsening pain    Increased swelling around the bruise    Pain spreads to one or both legs    Weakness or numbness in one or both legs     Loss of bowel or bladder control    Numbness in the groin or genital area    Fever of 100.4 F (38 C) or higher, or as directed by your healthcare provider  Kareem last reviewed this educational content on 7/1/2017 2000-2020 [employer] [place]. [address] [place] [address], [place] [address]. All rights reserved. This information is not intended as a substitute for professional medical care. Always follow your healthcare professional's instructions.

## 2020-12-28 NOTE — TELEPHONE ENCOUNTER
Accident on [address].  Fell into a retaining wall. She slipped off the swing under their deck.   Point of rock went into left kidney area.   Burning pain continues.   Pain rated as 9 out of ten.  No visible blood in urine.  She hasn't wanted to go to the hospital for this so has waited until today to get a clinic appointment.  Protocol instructed ER.  Patient preferred trying to get a clinic appointment. Understands she must be seen so if no appointments available she will go to an ER.  I transfered Paulette to scheduling.    COVID 19 Nurse Triage Plan/Patient Instructions    Please be aware that novel coronavirus (COVID-19) may be circulating in the community. If you develop symptoms such as fever, cough, or SOB or if you have concerns about the presence of another infection including coronavirus (COVID-19), please contact your health care provider or visit [email or web address].     Disposition/Instructions    In-Person Visit with provider recommended. Reference Visit Selection Guide.    Thank you for taking steps to prevent the spread of this virus.  o Limit your contact with others.  o Wear a simple mask to cover your cough.  o Wash your hands well and often.    Resources    [facility]: About COVID-19: [email or web address]    CDC: What to Do If You're Sick: www.cdc.gov/coronavirus/2019-ncov/about/steps-when-sick.html    CDC: Ending Home Isolation: www.cdc.gov/coronavirus/2019-ncov/hcp/disposition-in-home-patients.html     CDC: Caring for Someone: www.cdc.gov/coronavirus/2019-ncov/if-you-are-sick/care-for-someone.html     [facility]: Interim Guidance for Hospital Discharge to Home: www.health.state.[place]/diseases/coronavirus/hcp/hospdischarge.pdf    [facility] clinical trials (COVID-19 research studies): [email or web address]     Below are the COVID-19 hotlines at the [facility] ([facility]). Interpreters are available.   o For health questions: Call  558.561.2500 or 1-346.398.5428 (7 a.m. to 7 p.m.)  o For questions about schools and childcare: Call 780-310-3215 or 1-128.774.9357 (7 a.m. to 7 p.m.)     Reason for Disposition    [1] SEVERE PAIN in kidney area (flank) AND [2] follows direct blow to that site    Additional Information    Negative: Dangerous mechanism of injury (e.g., MVA, contact sports, trampoline, diving, fall > 10 feet or 3 meters)  (Exception: back pain began > 1 hour after injury)    Negative: [1] Weakness (i.e., paralysis, loss of muscle strength) of the leg(s) or foot AND [2] sudden onset after back injury    Negative: [1] Numbness (i.e., loss of sensation) of the leg(s) or foot AND [2] sudden onset after back injury    Negative: [1] Major bleeding (e.g., actively dripping or spurting) AND [2] can't be stopped    Negative: Bullet wound, knife wound, or other penetrating object    Negative: Shock suspected (e.g., cold/pale/clammy skin, too weak to stand, low BP, rapid pulse)    Negative: Sounds like a life-threatening emergency to the triager    Negative: [1] Injuries at more than 1 site AND [2] unsure which guideline to use    Negative: Injury to the neck    Negative: Injury to the tailbone    Negative: Back pain not from an injury    Negative: Back pain from overuse (work, exercise, gardening) OR from twisting, lifting, or bending injury    Protocols used: BACK INJURY-JAMILA-ZAYDA STOCK RN [facility]

## 2021-01-15 ENCOUNTER — HEALTH MAINTENANCE LETTER (OUTPATIENT)
Age: 56
End: 2021-01-15

## 2021-06-17 ENCOUNTER — TELEPHONE (OUTPATIENT)
Dept: FAMILY MEDICINE | Facility: CLINIC | Age: 56
End: 2021-06-17

## 2021-06-17 DIAGNOSIS — Z12.31 ENCOUNTER FOR SCREENING MAMMOGRAM FOR BREAST CANCER: Primary | ICD-10-CM

## 2021-06-17 NOTE — TELEPHONE ENCOUNTER
Patient cold calling, says she is scheduled for an early AM physical with Laura Sarah on 6/30/21; hoping she can get her mammogram done the same day as she will be off work.   Needs an order.    She denies any breast pain, lumps, or discharge.    Routed to PCP to address request.  I did advise patient she can call to try to schedule that tomorrow if she does not hear back from us,  will look for order.    Meri Fritz RN  [facility]

## 2021-06-18 NOTE — TELEPHONE ENCOUNTER
Navneet Sheridan CMA contacted Paulette on 06/18/21 and left a message. If patient calls back please schedule appointment for screening mammo.    A2B message sent

## 2021-06-29 NOTE — PROGRESS NOTES
SUBJECTIVE:   CC: Paulette Flores is an 56 year old woman who presents for preventive health visit.       Patient has been advised of split billing requirements and indicates understanding: Yes  Healthy Habits:    Do you get at least three servings of calcium containing foods daily (dairy, green leafy vegetables, etc.)? yes    Amount of exercise or daily activities, outside of work: none    Problems taking medications regularly No    Medication side effects: No    Have you had an eye exam in the past two years? yes    Do you see a dentist twice per year? yes    Do you have sleep apnea, excessive snoring or daytime drowsiness?no    The 10-year ASCVD risk score (Juliocesar GILL Jr., et al., 2013) is: 3.9%    Values used to calculate the score:      Age: 56 years      Sex: Female      Is Non- : No      Diabetic: No      Tobacco smoker: Yes      Systolic Blood Pressure: 113 mmHg      Is BP treated: No      HDL Cholesterol: 65 mg/dL      Total Cholesterol: 188 mg/dL     PROBLEMS TO ADD ON...  none    Today's PHQ-2 Score:   PHQ-2 ( 1999 [employer]) 6/30/2021 12/28/2020   Q1: Little interest or pleasure in doing things 0 0   Q2: Feeling down, depressed or hopeless 0 0   PHQ-2 Score 0 0   Q1: Little interest or pleasure in doing things - -   Q2: Feeling down, depressed or hopeless - -   PHQ-2 Score - -       Abuse: Current or Past(Physical, Sexual or Emotional)- No  Do you feel safe in your environment? Yes        Social History     Tobacco Use     Smoking status: Light Tobacco Smoker     Packs/day: 0.10     Years: 16.00     Pack years: 1.60     Types: Cigarettes     Smokeless tobacco: Never Used     Tobacco comment: trying to quit/cut down   Substance Use Topics     Alcohol use: No     Alcohol/week: 0.0 standard drinks     If you drink alcohol do you typically have >3 drinks per day or >7 drinks per week? No                     Reviewed orders with patient.  Reviewed health maintenance and updated orders  "accordingly - Yes  Lab work is in process    FHS-7: No flowsheet data found.    Mammogram Screening: Recommended mammography every 1-2 years with patient discussion and risk factor consideration  Pertinent mammograms are reviewed under the imaging tab.    Pertinent mammograms are reviewed under the imaging tab.  History of abnormal Pap smear: NO - age 30-65 PAP every 5 years with negative HPV co-testing recommended  PAP / HPV Latest Ref Rng & Units 8/4/2017   PAP - NIL   HPV 16 DNA NEG Negative   HPV 18 DNA NEG Negative   OTHER HR HPV NEG Negative     Reviewed and updated as needed this visit by clinical staff  Tobacco  Allergies  Meds              Reviewed and updated as needed this visit by Provider                No past medical history on file.     ROS:  Other than what is noted in the HPI and PMH a complete review of systems is otherwise negative including: Constitutional, HEENT, endocrine, cardiovascular, respiratory, GI/, musculoskeletal, neuro, and psychiatric.     OBJECTIVE:   /62   Pulse 78   Temp 98  F (36.7  C) (Tympanic)   Resp 14   Ht 1.588 m (5' 2.5\")   Wt 90.9 kg (200 lb 8 oz)   LMP  (LMP Unknown)   SpO2 97%   Breastfeeding No   BMI 36.09 kg/m    EXAM:  GENERAL: healthy, alert and no distress  EYES: Eyes grossly normal to inspection, PERRL and conjunctivae and sclerae normal  HENT: ear canals and TM's normal, nose and mouth without ulcers or lesions  NECK: no adenopathy, no asymmetry, masses, or scars and thyroid normal to palpation  RESP: lungs clear to auscultation - no rales, rhonchi or wheezes  BREAST: normal without masses, tenderness or nipple discharge and no palpable axillary masses or adenopathy  CV: regular rates and rhythm, normal S1 S2, no S3 or S4 and no murmur, click or rub  ABDOMEN: soft, nontender, no hepatosplenomegaly, no masses and bowel sounds normal  MS: no gross musculoskeletal defects noted, no edema  SKIN: no suspicious lesions or rashes  NEURO: Normal " "strength and tone, mentation intact and speech normal  PSYCH: mentation appears normal, affect normal/bright    ASSESSMENT/PLAN:       ICD-10-CM    1. Routine general medical examination at a health care facility  Z00.00    2. Lipid screening  Z13.220 Lipid Profile (Chol, Trig, HDL, LDL calc)   3. Diabetes mellitus screening  Z13.1 Glucose   4. Family history of hypothyroidism  Z83.49 TSH with free T4 reflex       1-3) Screenings discussed    4) TSH ordered due to family history      Patient has been advised of split billing requirements and indicates understanding: Yes  COUNSELING:   Reviewed preventive health counseling, as reflected in patient instructions    Estimated body mass index is 36.09 kg/m  as calculated from the following:    Height as of this encounter: 1.588 m (5' 2.5\").    Weight as of this encounter: 90.9 kg (200 lb 8 oz).      She reports that she has been smoking cigarettes. She has a 1.60 pack-year smoking history. She has never used smokeless tobacco.  Tobacco Cessation Action Plan      Counseling Resources:  ATP IV Guidelines  Pooled Cohorts Equation Calculator  Breast Cancer Risk Calculator  BRCA-Related Cancer Risk Assessment: FHS-7 Tool  FRAX Risk Assessment  ICSI Preventive Guidelines  Dietary Guidelines for Americans, 2010  USDA's MyPlate  ASA Prophylaxis  Lung CA Screening    Laura Sarah PA-C  [facility]  "

## 2021-06-30 ENCOUNTER — OFFICE VISIT (OUTPATIENT)
Dept: FAMILY MEDICINE | Facility: CLINIC | Age: 56
End: 2021-06-30
Payer: COMMERCIAL

## 2021-06-30 ENCOUNTER — ANCILLARY PROCEDURE (OUTPATIENT)
Dept: MAMMOGRAPHY | Facility: CLINIC | Age: 56
End: 2021-06-30
Payer: COMMERCIAL

## 2021-06-30 VITALS
RESPIRATION RATE: 14 BRPM | HEART RATE: 78 BPM | TEMPERATURE: 98 F | WEIGHT: 200.5 LBS | DIASTOLIC BLOOD PRESSURE: 62 MMHG | BODY MASS INDEX: 35.53 KG/M2 | HEIGHT: 63 IN | SYSTOLIC BLOOD PRESSURE: 113 MMHG | OXYGEN SATURATION: 97 %

## 2021-06-30 DIAGNOSIS — Z13.220 LIPID SCREENING: ICD-10-CM

## 2021-06-30 DIAGNOSIS — Z83.49 FAMILY HISTORY OF HYPOTHYROIDISM: ICD-10-CM

## 2021-06-30 DIAGNOSIS — Z00.00 ROUTINE GENERAL MEDICAL EXAMINATION AT A HEALTH CARE FACILITY: Primary | ICD-10-CM

## 2021-06-30 DIAGNOSIS — Z12.31 ENCOUNTER FOR SCREENING MAMMOGRAM FOR BREAST CANCER: ICD-10-CM

## 2021-06-30 DIAGNOSIS — Z13.1 DIABETES MELLITUS SCREENING: ICD-10-CM

## 2021-06-30 LAB
CHOLEST SERPL-MCNC: 202 MG/DL
GLUCOSE SERPL-MCNC: 89 MG/DL (ref 70–99)
HDLC SERPL-MCNC: 64 MG/DL
LDLC SERPL CALC-MCNC: 124 MG/DL
NONHDLC SERPL-MCNC: 138 MG/DL
TRIGL SERPL-MCNC: 72 MG/DL
TSH SERPL DL<=0.005 MIU/L-ACNC: 1.76 MU/L (ref 0.4–4)

## 2021-06-30 PROCEDURE — 80061 LIPID PANEL: CPT | Performed by: PHYSICIAN ASSISTANT

## 2021-06-30 PROCEDURE — 82947 ASSAY GLUCOSE BLOOD QUANT: CPT | Performed by: PHYSICIAN ASSISTANT

## 2021-06-30 PROCEDURE — 99396 PREV VISIT EST AGE 40-64: CPT | Performed by: PHYSICIAN ASSISTANT

## 2021-06-30 PROCEDURE — 84443 ASSAY THYROID STIM HORMONE: CPT | Performed by: PHYSICIAN ASSISTANT

## 2021-06-30 PROCEDURE — 77067 SCR MAMMO BI INCL CAD: CPT | Mod: TC | Performed by: RADIOLOGY

## 2021-06-30 PROCEDURE — 36415 COLL VENOUS BLD VENIPUNCTURE: CPT | Performed by: PHYSICIAN ASSISTANT

## 2021-06-30 ASSESSMENT — MIFFLIN-ST. JEOR: SCORE: 1450.65

## 2021-07-29 ENCOUNTER — TELEPHONE (OUTPATIENT)
Dept: ORTHOPEDICS | Facility: CLINIC | Age: 56
End: 2021-07-29

## 2021-07-29 DIAGNOSIS — M77.42 METATARSALGIA OF BOTH FEET: Primary | ICD-10-CM

## 2021-07-29 DIAGNOSIS — M77.41 METATARSALGIA OF BOTH FEET: Primary | ICD-10-CM

## 2021-07-29 NOTE — TELEPHONE ENCOUNTER
I called Paulette back this morning and let her know that I placed a new order for orthotics.    .Sneha Alfaro, ATC

## 2021-07-29 NOTE — TELEPHONE ENCOUNTER
[facility] Call Center    Phone Message    May a detailed message be left on voicemail: yes     Reason for Call  Patient called asking for Referral for Inserts. Please call Patient . Action Taken: Message routed to:  [facility]): vitaly    Travel Screening: Not Applicable

## 2021-09-18 ENCOUNTER — HEALTH MAINTENANCE LETTER (OUTPATIENT)
Age: 56
End: 2021-09-18

## 2021-10-04 NOTE — PROGRESS NOTES
Chief Complaint   Patient presents with     Cerumen Impaction     Needs ears cleaned - getting some vertigo symptoms and waking her up at night when she turns a certain- hx of Vertigo- has done PT      History of Present Illness   Paulette Flores is a 56 year old female who presents today for ear evaluation.  I evaluated the patient on 6/24/2020 with ear and sinus concerns.  I cleaned her ears at that visit.  She presents today for repeat ear exam and ear cleaning. The patient denies significant hearing changes.  She has had multiple surgeries for what sounds like was patulous eustachian tube initially.  She did developed middle ear pathology and what sounds like retraction or tympanic membrane perforation.  She states has had 6 surgeries in her ear.  The most recent surgery was over 15 years ago.  Patient denies any otalgia, otorrhea, bloody otorrhea.  She is having issues with some more ear fullness on the left-hand side.  She is also had problems with positional vertigo in the past.  She has been having some dizziness when turning over in bed recently.  She is tried some home exercises but is still intermittently having symptoms.     The patient underwent an audiogram performed 6/24/2020.  My review of the audiogram showed normal hearing in the right ear and mild sloping to severe conductive hearing loss in the left ear.  Pure-tone average was 11 dB on the right and 33 dB on the left.  Speech reception threshold was 10 dB on the right and 30 dB on the left.  The patient had 96% word recognition on the right and 92% word recognition on the left.  The patient had a type A tympanogram on the right and a type A tympanogram on the left.       Past Medical History  Patient Active Problem List   Diagnosis     Conductive hearing loss in left ear     DDD (degenerative disc disease), lumbar     Decreased GFR     Current Medications  No current outpatient medications on file.    Allergies  Allergies   Allergen Reactions      Percocet [Oxycodone-Acetaminophen] Difficulty breathing     Throat closed     Vicodin [Hydrocodone-Acetaminophen] Nausea and Vomiting       Social History  Social History     Socioeconomic History     Marital status:      Spouse name: Not on file     Number of children: Not on file     Years of education: Not on file     Highest education level: Not on file   Occupational History     Not on file   Tobacco Use     Smoking status: Light Tobacco Smoker     Packs/day: 0.10     Years: 20.00     Pack years: 2.00     Types: Cigarettes     Smokeless tobacco: Never Used     Tobacco comment: trying to quit/cut down   Substance and Sexual Activity     Alcohol use: No     Alcohol/week: 0.0 standard drinks     Drug use: No     Sexual activity: Yes     Partners: Male   Other Topics Concern     Parent/sibling w/ CABG, MI or angioplasty before 65F 55M? 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28    Grandchildren     Social Determinants of Health     Financial Resource Strain:      Difficulty of Paying Living Expenses:    Food Insecurity:      Worried About Running Out of Food in the Last Year:      Ran Out of Food in the Last Year:    Transportation Needs:      Lack of Transportation (Medical):      Lack of Transportation (Non-Medical):    Physical Activity:      Days of Exercise per Week:      Minutes of Exercise per Session:    Stress:      Feeling of Stress :    Social Connections:      Frequency of Communication with Friends and Family:      Frequency of Social Gatherings with Friends and Family:      Attends [employer] Services:      Active Member of Clubs or Organizations:      Attends Club or Organization Meetings:      Marital Status:    Intimate Partner Violence:      Fear of Current or Ex-Partner:      Emotionally Abused:      Physically Abused:      Sexually Abused:        Family History  Family History   Problem Relation Age of Onset     Hypertension Mother       "Hypothyroidism Mother      Hypertension Father      Graves' disease Sister      Hypothyroidism Sister        Review of Systems  As per HPI and PMHx, otherwise 7 system review of the head and neck negative.    Physical Exam  /63 (BP Location: Right arm, Patient Position: Sitting, Cuff Size: Adult Large)   Pulse 67   Temp 98  F (36.7  C) (Tympanic)   Ht 1.588 m (5' 2.5\")   Wt 90.7 kg (200 lb)   LMP  (LMP Unknown)   BMI 36.00 kg/m    GENERAL: Patient is a pleasant, cooperative 56 year old female in no acute distress.  HEAD: Normocephalic, atraumatic.  Hair and scalp are normal.  EYES: Pupils are equal, round, reactive to light and accommodation.  Extraocular movements are intact.  The sclera nonicteric without injection.  The extraocular structures are normal.  EARS: See below.  NEUROLOGIC: Cranial nerves II through XII are grossly intact.  Voice is strong.  Facial nerve examination incomplete due to the patient wearing a mask.  CARDIOVASCULAR: Extremities are warm and well-perfused.  No significant peripheral edema.  RESPIRATORY: Patient has nonlabored breathing without cough, wheeze, stridor.  PSYCHIATRIC: Patient is alert and oriented.  Mood and affect appear normal.  SKIN: Warm and dry.  No scalp, face, or neck lesions noted.    Physical Exam and Procedure    EARS: Normal shape and symmetry.  No tenderness when palpating the mastoid or tragal areas bilaterally.  The ears were then examined under the otic binocular microscope to perform cerumen removal.  Otoscopic exam on the right reveals impacted cerumen down to the level of the tympanic membrane.  The cerumen was removed with a curette.  The right tympanic membrane was round, intact without evidence of effusion.  No retraction, granulation, drainage, or evidence of cholesteatoma.      Attention was turned to the left ear.  Otoscopic exam on the left reveals a minimal amount of cerumen.  The patient's left tympanic membrane is intact.  Her landmarks " are bit atypical given her previous ear surgeries.  There is a little bit of respiratory variation to her tympanic membrane.  There is no evidence of middle ear effusion, no retraction, granulation, drainage, or evidence of cholesteatoma.      Assessment and Plan     ICD-10-CM    1. Conductive hearing loss of left ear with unrestricted hearing of right ear  H90.12 Physical Therapy Referral   2. History of ear surgery  Z98.890 Physical Therapy Referral   3. History of endoscopic sinus surgery  Z98.890 Physical Therapy Referral   4. Benign positional vertigo, unspecified laterality  H81.10    5. Impacted cerumen of right ear  H61.21 Remove Cerumen     It was my pleasure seeing Paulette Flores today in clinic.  The patient had cerumen cleaned from both ears today in office.  We reviewed her audiogram from last year and she does have some mild conductive hearing loss in the left ear.  She is not tried a hearing aid previously.  I do think that some of her ear fullness is related to her hearing loss.  We discussed a trial of amplification.  She would need an updated audiogram.  The patient will consider this as an option.     She is also having symptoms that are consistent with positional vertigo.  She struggled with this in the past.  I placed referral to physical therapy for evaluation of positional vertigo.     James Villanueva MD  Department of Otolaryngology-Head and Neck Surgery  [facility]

## 2021-10-06 ENCOUNTER — OFFICE VISIT (OUTPATIENT)
Dept: OTOLARYNGOLOGY | Facility: CLINIC | Age: 56
End: 2021-10-06
Payer: COMMERCIAL

## 2021-10-06 VITALS
BODY MASS INDEX: 35.44 KG/M2 | HEART RATE: 67 BPM | HEIGHT: 63 IN | SYSTOLIC BLOOD PRESSURE: 123 MMHG | WEIGHT: 200 LBS | TEMPERATURE: 98 F | DIASTOLIC BLOOD PRESSURE: 63 MMHG

## 2021-10-06 DIAGNOSIS — H90.12 CONDUCTIVE HEARING LOSS OF LEFT EAR WITH UNRESTRICTED HEARING OF RIGHT EAR: Primary | ICD-10-CM

## 2021-10-06 DIAGNOSIS — H61.21 IMPACTED CERUMEN OF RIGHT EAR: ICD-10-CM

## 2021-10-06 DIAGNOSIS — Z98.890 HISTORY OF EAR SURGERY: ICD-10-CM

## 2021-10-06 DIAGNOSIS — H81.10 BENIGN POSITIONAL VERTIGO, UNSPECIFIED LATERALITY: ICD-10-CM

## 2021-10-06 DIAGNOSIS — Z98.890 HISTORY OF ENDOSCOPIC SINUS SURGERY: ICD-10-CM

## 2021-10-06 PROCEDURE — 69210 REMOVE IMPACTED EAR WAX UNI: CPT | Performed by: OTOLARYNGOLOGY

## 2021-10-06 PROCEDURE — 99213 OFFICE O/P EST LOW 20 MIN: CPT | Mod: 25 | Performed by: OTOLARYNGOLOGY

## 2021-10-06 ASSESSMENT — MIFFLIN-ST. JEOR: SCORE: 1448.38

## 2021-10-06 NOTE — LETTER
10/6/2021         RE: Paulette Flores  [address]  [place] [address]        Dear Colleague,    Thank you for referring your patient, Paulette Flores, to the [facility]. Please see a copy of my visit note below.    Chief Complaint   Patient presents with     Cerumen Impaction     Needs ears cleaned - getting some vertigo symptoms and waking her up at night when she turns a certain- hx of Vertigo- has done PT      History of Present Illness   Paulette Flores is a 56 year old female who presents today for ear evaluation.  I evaluated the patient on 6/24/2020 with ear and sinus concerns.  I cleaned her ears at that visit.  She presents today for repeat ear exam and ear cleaning. The patient denies significant hearing changes.  She has had multiple surgeries for what sounds like was patulous eustachian tube initially.  She did developed middle ear pathology and what sounds like retraction or tympanic membrane perforation.  She states has had 6 surgeries in her ear.  The most recent surgery was over 15 years ago.  Patient denies any otalgia, otorrhea, bloody otorrhea.  She is having issues with some more ear fullness on the left-hand side.  She is also had problems with positional vertigo in the past.  She has been having some dizziness when turning over in bed recently.  She is tried some home exercises but is still intermittently having symptoms.     The patient underwent an audiogram performed 6/24/2020.  My review of the audiogram showed normal hearing in the right ear and mild sloping to severe conductive hearing loss in the left ear.  Pure-tone average was 11 dB on the right and 33 dB on the left.  Speech reception threshold was 10 dB on the right and 30 dB on the left.  The patient had 96% word recognition on the right and 92% word recognition on the left.  The patient had a type A tympanogram on the right and a type A tympanogram on the left.       Past Medical History  Patient Active  Problem List   Diagnosis     Conductive hearing loss in left ear     DDD (degenerative disc disease), lumbar     Decreased GFR     Current Medications  No current outpatient medications on file.    Allergies  Allergies   Allergen Reactions     Percocet [Oxycodone-Acetaminophen] Difficulty breathing     Throat closed     Vicodin [Hydrocodone-Acetaminophen] Nausea and Vomiting       Social History  Social History     Socioeconomic History     Marital status:      Spouse name: Not on file     Number of children: Not on file     Years of education: Not on file     Highest education level: Not on file   Occupational History     Not on file   Tobacco Use     Smoking status: Light Tobacco Smoker     Packs/day: 0.10     Years: 20.00     Pack years: 2.00     Types: Cigarettes     Smokeless tobacco: Never Used     Tobacco comment: trying to quit/cut down   Substance and Sexual Activity     Alcohol use: No     Alcohol/week: 0.0 standard drinks     Drug use: No     Sexual activity: Yes     Partners: Male   Other Topics Concern     Parent/sibling w/ CABG, MI or angioplasty before 65F 55M? 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     Social Determinants of Health     Financial Resource Strain:      Difficulty of Paying Living Expenses:    Food Insecurity:      Worried About Running Out of Food in the Last Year:      Ran Out of Food in the Last Year:    Transportation Needs:      Lack of Transportation (Medical):      Lack of Transportation (Non-Medical):    Physical Activity:      Days of Exercise per Week:      Minutes of Exercise per Session:    Stress:      Feeling of Stress :    Social Connections:      Frequency of Communication with Friends and Family:      Frequency of Social Gatherings with Friends and Family:      Attends Religion Services:      Active Member of Clubs or Organizations:      Attends Club or Organization Meetings:      Marital  "Status:    Intimate Partner Violence:      Fear of Current or Ex-Partner:      Emotionally Abused:      Physically Abused:      Sexually Abused:        Family History  Family History   Problem Relation Age of Onset     Hypertension Mother      Hypothyroidism Mother      Hypertension Father      Graves' disease Sister      Hypothyroidism Sister        Review of Systems  As per HPI and PMHx, otherwise 7 system review of the head and neck negative.    Physical Exam  /63 (BP Location: Right arm, Patient Position: Sitting, Cuff Size: Adult Large)   Pulse 67   Temp 98  F (36.7  C) (Tympanic)   Ht 1.588 m (5' 2.5\")   Wt 90.7 kg (200 lb)   LMP  (LMP Unknown)   BMI 36.00 kg/m    GENERAL: Patient is a pleasant, cooperative 56 year old female in no acute distress.  HEAD: Normocephalic, atraumatic.  Hair and scalp are normal.  EYES: Pupils are equal, round, reactive to light and accommodation.  Extraocular movements are intact.  The sclera nonicteric without injection.  The extraocular structures are normal.  EARS: See below.  NEUROLOGIC: Cranial nerves II through XII are grossly intact.  Voice is strong.  Facial nerve examination incomplete due to the patient wearing a mask.  CARDIOVASCULAR: Extremities are warm and well-perfused.  No significant peripheral edema.  RESPIRATORY: Patient has nonlabored breathing without cough, wheeze, stridor.  PSYCHIATRIC: Patient is alert and oriented.  Mood and affect appear normal.  SKIN: Warm and dry.  No scalp, face, or neck lesions noted.    Physical Exam and Procedure    EARS: Normal shape and symmetry.  No tenderness when palpating the mastoid or tragal areas bilaterally.  The ears were then examined under the otic binocular microscope to perform cerumen removal.  Otoscopic exam on the right reveals impacted cerumen down to the level of the tympanic membrane.  The cerumen was removed with a curette.  The right tympanic membrane was round, intact without evidence of effusion. "  No retraction, granulation, drainage, or evidence of cholesteatoma.      Attention was turned to the left ear.  Otoscopic exam on the left reveals a minimal amount of cerumen.  The patient's left tympanic membrane is intact.  Her landmarks are bit atypical given her previous ear surgeries.  There is a little bit of respiratory variation to her tympanic membrane.  There is no evidence of middle ear effusion, no retraction, granulation, drainage, or evidence of cholesteatoma.      Assessment and Plan     ICD-10-CM    1. Conductive hearing loss of left ear with unrestricted hearing of right ear  H90.12 Physical Therapy Referral   2. History of ear surgery  Z98.890 Physical Therapy Referral   3. History of endoscopic sinus surgery  Z98.890 Physical Therapy Referral   4. Benign positional vertigo, unspecified laterality  H81.10    5. Impacted cerumen of right ear  H61.21 Remove Cerumen     It was my pleasure seeing Paulette Flores today in clinic.  The patient had cerumen cleaned from both ears today in office.  We reviewed her audiogram from last year and she does have some mild conductive hearing loss in the left ear.  She is not tried a hearing aid previously.  I do think that some of her ear fullness is related to her hearing loss.  We discussed a trial of amplification.  She would need an updated audiogram.  The patient will consider this as an option.     She is also having symptoms that are consistent with positional vertigo.  She struggled with this in the past.  I placed referral to physical therapy for evaluation of positional vertigo.     James Villanueva MD  Department of Otolaryngology-Head and Neck Surgery  [facility]         Again, thank you for allowing me to participate in the care of your patient.        Sincerely,        James Villanueva MD

## 2021-10-06 NOTE — NURSING NOTE
"Initial /63 (BP Location: Right arm, Patient Position: Sitting, Cuff Size: Adult Large)   Pulse 67   Temp 98  F (36.7  C) (Tympanic)   Ht 1.588 m (5' 2.5\")   Wt 90.7 kg (200 lb)   LMP  (LMP Unknown)   BMI 36.00 kg/m   Estimated body mass index is 36 kg/m  as calculated from the following:    Height as of this encounter: 1.588 m (5' 2.5\").    Weight as of this encounter: 90.7 kg (200 lb). .    Reina Christine CMA    "

## 2021-10-06 NOTE — PATIENT INSTRUCTIONS
Per physician instructions      If you have questions or concerns on any instructions given to you by your provider today or if you need to schedule an appointment, you can reach us at [phone].

## 2021-10-08 ENCOUNTER — ANCILLARY PROCEDURE (OUTPATIENT)
Dept: GENERAL RADIOLOGY | Facility: CLINIC | Age: 56
End: 2021-10-08
Attending: PODIATRIST
Payer: COMMERCIAL

## 2021-10-08 ENCOUNTER — OFFICE VISIT (OUTPATIENT)
Dept: PODIATRY | Facility: CLINIC | Age: 56
End: 2021-10-08
Payer: COMMERCIAL

## 2021-10-08 VITALS
WEIGHT: 200 LBS | DIASTOLIC BLOOD PRESSURE: 87 MMHG | HEART RATE: 83 BPM | HEIGHT: 63 IN | BODY MASS INDEX: 35.44 KG/M2 | SYSTOLIC BLOOD PRESSURE: 127 MMHG

## 2021-10-08 DIAGNOSIS — M21.622 TAILOR'S BUNIONETTE, LEFT: ICD-10-CM

## 2021-10-08 DIAGNOSIS — L84 CALLUS OF FOOT: Primary | ICD-10-CM

## 2021-10-08 DIAGNOSIS — M77.52 TENDINITIS OF LEFT FOOT: ICD-10-CM

## 2021-10-08 DIAGNOSIS — M79.672 LEFT FOOT PAIN: ICD-10-CM

## 2021-10-08 DIAGNOSIS — M79.89 MASS OF SOFT TISSUE OF FOOT: ICD-10-CM

## 2021-10-08 PROCEDURE — 99203 OFFICE O/P NEW LOW 30 MIN: CPT | Performed by: PODIATRIST

## 2021-10-08 PROCEDURE — 73630 X-RAY EXAM OF FOOT: CPT | Mod: LT | Performed by: RADIOLOGY

## 2021-10-08 ASSESSMENT — MIFFLIN-ST. JEOR: SCORE: 1448.38

## 2021-10-08 NOTE — NURSING NOTE
"Chief Complaint   Patient presents with     Consult     callus on the right, bunion on the left, XR today       Initial /87   Pulse 83   Ht 1.588 m (5' 2.5\")   Wt 90.7 kg (200 lb)   LMP  (LMP Unknown)   BMI 36.00 kg/m   Estimated body mass index is 36 kg/m  as calculated from the following:    Height as of this encounter: 1.588 m (5' 2.5\").    Weight as of this encounter: 90.7 kg (200 lb).  Medications and allergies reviewed.      Cata EMANUEL MA    "

## 2021-10-08 NOTE — PATIENT INSTRUCTIONS
Calluses of the Foot    I.  Calluses on the toes   A.  Tips of the toes    1.  Due to pressure on the tips of the toes when wearing shoes, sandals or barefoot.    2.  Related to hammertoe deformity of the toes.    3.  Treated with wearing soft cushioned toe caps or shoe liners to better offload the pressure to the tips of the toes    4.  Correcting the deformity of the toe is another option if cushions do not help   B.  Top of sides of the knuckles of the toes    1.  Due to pressure from adjacent toes or shoes on the knuckles of the toes.    2.  Can be related to hammertoe deformities    3.  Treated with wearing roomy shoes with soft top-cover material in order not to rub on the skin    4.  Silicone toe caps can also be used to protect rubbing from the knuckles of the adjacent toes.    5.  Correcting the deformity of the toe is another option if offloading measures do not work.  II. Calluses on the bottom of the foot   A.   Pressure points    1.  Caused by direct pressure overlying prominent bones such as metatarsal head on the balls of the foot.    2.  Can be related to either hammertoe deformities or fat pad atrophy    3.  Can be treated with additional cushion utilizing silicone shoe liners to better offload the pressure and supplement for fat pad atrophy.    4.  Surgical correction of hammertoe deformities is another option if offloading cushion treatment does not work.   B.   Shear forces    1.  Caused by sliding forces along the skin on the bottom of the forefoot including the great toe.    2.  This is not due to a rotational force as the foot pushes off against the ground.    3.  Treated with wearing a silicone shoe liner that can move with the skin reducing the amount of shear force causing the callus formation.   C.   IPK lesions or porokeratosis    1.  These are small hard callus lesions that are related to plugged sweat ducts.    2.  These ducts travel through the epidermis and dermis residing in the  subcutaneous tissue    3.  When the ducts get clogged, they can harden up resulting in callused skin around them.      4.  Treatment includes sharp excavation of the hyperkeratotic callus tissue core as far as can be tolerated, preferably without bleeding.    5.   Other treatment options   A.  Application of salicylic acid to soften the hyperkeratotic skin   B.  Cantharone which causes a blister in attempt to pull off the hyperkeratotic skin lesion.    Helpful products:    Soles  Silicone Shoe Inserts    Amazon: https://www.Wavemark/Soles-Silicone-Orthopedic-Comfortable-Hypoallergenic/dp/P09OSFX5GA/ref=sr_1_2?dchild=1&keywords=silicone+sole&dvp=0184741350&sr=8-2    Dr. Cadena's Gels Toe Caps      Sold at [facility]    Amazon: https://www.Wavemark/Benito-All-Mini-Size/dp/R90WYAJW91/ref=sr_1_2?crid=Q8ENATANXREZ&dchild=1&keywords=+myrons+all+gel+toe+cap&flc=9285823030&sprefix=.+Myron%27s+gels+toe%2Caps%2C171&sr=8-2      SMOKING CESSATION  What's in cigarette smoke? - Cigarette smoke contains over 4,000 chemicals. Nicotine is one of the main ingredients which is an insecticide/herbicide. It is poisonous to our nervous system, increases blood clotting risk, and decreases the body's defenses to fight off infection. Another chemical is Carbon Monoxide is an asphyxiating gas that permanently binds to blood cells and blocks the transport of oxygen. This leads to tissue death and decreases your metabolism. Tar is a chemical that coats your lungs and trachea which impairs new oxygen coming in and carbon dioxide getting out of your body.   How does smoking impact surgery? - Smoking is particularly hazardous with regards to surgery. Surgery puts stress on the body and a smoker's body is already under strain from these chemicals. Putting the two together, especially for an elective surgery, could be a recipe for disaster. Smoking before and after surgery increases your risk  of heart problems, slow wound healing, delayed bone healing, blood clots, wound infection and anesthesia complications.   What are the benefits of quitting? - In 20 minutes your blood pressure will drop back down to normal. In 8 hours the carbon monoxide (a toxic gas) levels in your blood stream will drop by half, and oxygen levels will return to normal. In 48 hours your chance of having a heart attack will have decreased. All nicotine will have left your body. Your sense of taste and smell will return to a normal level. In 72 hours your bronchial tubes will relax, and your energy levels will increase. In 2 weeks your circulation will increase, and it will continue to improve for the next 10 weeks.    Recommendations for elective surgery - Ideally, patients should quit smoking 8 weeks before and at least 2 weeks after elective surgery in order to avoid complications. Simply cutting back on the amount of cigarettes smoked per day does not offer any benefit or decrease the risk of poor wound healing, heart problems, and infection. Smokers should also start taking Vitamin C and B for two weeks before surgery and two weeks after surgery.    Ways to Stop Smokin. Nicotine patches, lozenges, or gum  2. Support Groups  3. Medications (see below)    List of Medications:  1. Varenicline Tartrate (CHANTIX) (may be discontinued by FDA as of 2021)  2. Bupropion HCL (WELLBUTRIN, ZYBAN) - note: make sure Wellbutrin is for smoking cessation and not other issues   3. Nicotine Patch (NICODERM)   4. Nicotine Inhaler (NICOTROL)   5. Nicotine Gum Nicotine Polacrilex   6. Nicotine Lozenge: Nicotine Polacrilex (COMMIT)   * [facility] offers a smoking support group as well!  Please visit: [email or web address]  If you are interested in these, ask about getting a prescription or talk to your primary care doctor about what may be the best way for you to quit.       Next Steps:      1. Support:  a. Wear supportive  shoes, sandals, boots and/or inserts that have a rigid supportive sole.    i. This will offload the majority of tension forces that travel through your feet every step you take.    1. Skechers Max Cushioning Elite/Premier   2. Skechers Relax Fit D'Lux Walker  3. Reebok Walk Ultra 7 DMX MAX   4. Hoka Bondi walking shoes  5. Superfeet inserts ([email or web address])  b. It is important that you also wear supportive shoe wear in the house to continue providing support to your feet.    c. You may always use a cushioned liner for your shoes if that makes your feet feel better.  2. Stretching  a. Calf stretching is essential to offload the tension forces that travel through your feet every step you take  b. Preferred calf stretch is the Runner's Stretch  i. Place one foot behind the other foot, flat against the ground (it is important to keep the heel on the ground).  The back leg is the one that will be stretched.  1. Start with the knee straight and lean your hips into the wall, counter or whatever you are leaning into - count to ten.  2. Next, bend the knee.  You should feel the stretch lower in the calf muscle - count to ten.  c. Repeat this stretch once an hour to start off with.  When symptoms subside, I recommend performing the stretch 3 times daily to prevent any future problems.                3. Tissue Massage  a. It is important that you physically loosen the inflammation tissue to help your body heal the injured tissue.  b. I recommend soaking your foot in warm water to increase the microcirculation to the soft tissues.  You may add Epson salt to the water if you prefer.  c. You may apply an over-the-counter muscle rub, such as Icy Hot roll-on, and deeply massage the injured tissue.  4. Reduce Inflammation  a. You can ice the injured tissue with an ice pack with a light cloth covering or soaking in ice water 20 minutes to reduce any acute inflammation, typically at the end of the day.  b. If tolerated, you may  take a Non-Steroidal Antiinflammatory medication (NSAID), such as Advil or Aleve, to help reduce the inflammation tissue.  This can help the overall healing of the injured tissue.  i. It is important to take food with any NSAID medication as the most common side effect is stomach irritation.  If you encounter any problems when taking NSAID, it is recommended that you stop taking the medication and notify your provider.    It is important to understand that most problems that develop in the foot and ankle are caused by excessive tension that cause microinjury to the soft tissues and inflammation in the foot and ankle.  By addressing the underlying causes with support and stretching as well as treating the current inflammatory conditions with tissue massage and anti-inflammatory treatments, most foot and ankle musculoskeletal conditions will resolve.  This may take time to heal.  However, if symptoms persist past 4 weeks you should return to the office for reevaluation to determine further treatment options.

## 2021-10-08 NOTE — LETTER
"    10/8/2021         RE: Paulette Flores  [address]  [place] [address]        Dear Colleague,    Thank you for referring your patient, Paulette Flores, to the [facility] [place]. Please see a copy of my visit note below.    PATIENT HISTORY:  Paulette Flores is a 56 year old female who presents to clinic as a self referral with a chief complaint of right foot callus and left foot bunion.  The patient is seen by themselves.  The patient relates the pain is primarily located around the lateral left side of foot and lateral right side of foot.  Reports insidious onset without acute precipitating event. that has been going on for 9 year(s).  The patient has previously tried inserts with little relief.  Denies any prior history of similar issues..  The patient is currently employed as administrative.  Any previous notes and studies that pertain to the patient's condition were reviewed.    Pertinent medical, surgical and family history was reviewed in Epic chart and include lumbar degenerative disc disease    Medications: No current outpatient medications on file.     Allergies:    Allergies   Allergen Reactions     Percocet [Oxycodone-Acetaminophen] Difficulty breathing     Throat closed     Vicodin [Hydrocodone-Acetaminophen] Nausea and Vomiting       Vitals: /87   Pulse 83   Ht 1.588 m (5' 2.5\")   Wt 90.7 kg (200 lb)   LMP  (LMP Unknown)   BMI 36.00 kg/m    BMI= Body mass index is 36 kg/m .    LOWER EXTREMITY PHYSICAL EXAM    Dermatologic: Skin is intact to right and left lower extremity without significant lesions, rash or abrasion.        Vascular: DP & PT pulses are intact & regular on the right and left.   CFT and skin temperature is normal to the right and left lower extremity.     Neurologic: Lower extremity sensation is intact to light touch.  No evidence of weakness in the right and left lower extremity.        Musculoskeletal: Patient is ambulatory without assistive " device or brace.  No gross ankle deformity noted.  No foot or ankle joint effusion is noted.  Noted pain on palpation on the plantar aspect of the fourth metatarsal head on the left.  No surrounding erythema noted.  Noted palpable soft tissue mass in the area.    Diagnostics:  Radiographs included three views of the left foot demonstrating  no cortical erosions or periosteal elevation.  All joint margins appear stable.  There is no apparent fracture or tumor formation noted.  There is no evidence of foreign body.  The images were independently reviewed by myself along with the patient explaining the findings.      ASSESSMENT / PLAN:     ICD-10-CM    1. Callus of foot  L84    2. Left foot pain  M79.672 XR Foot Left G/E 3 Views   3. Tailor's bunionette, left  M21.622 XR Foot Left G/E 3 Views   4. Mass of soft tissue of foot  M79.89 MR Foot Left w/o Contrast    left foot sub fourth metatarsal head   5. Tendinitis of left foot  M77.52 MR Foot Left w/o Contrast       I have explained to Paulette about the conditions.  We discussed the underlying contributing factors to the condition as well as treatment options along with expected length of recovery.  At this time, the patient was ordered an MRI of the left foot for further evaluation of soft tissue mass on the plantar aspect of the left foot.  The patient will return to the office in approximately 1 week after obtaining the MRI exam to go over the results and further treatment options.    Paulette verbalized agreement with and understanding of the rational for the diagnosis and treatment plan.  All questions were answered to best of my ability and the patient's satisfaction. The patient was advised to contact the clinic with any questions that may arise after the clinic visit.      Disclaimer: This note consists of symbols derived from keyboarding, dictation and/or voice recognition software. As a result, there may be errors in the script that have gone undetected. Please  consider this when interpreting information found in this chart.       NATALIE Crook D.P.M., F.JAMILA.C.F.A.S.        Again, thank you for allowing me to participate in the care of your patient.        Sincerely,        Rafi Crook DPM

## 2021-10-08 NOTE — PROGRESS NOTES
"PATIENT HISTORY:  Paulette Flores is a 56 year old female who presents to clinic as a self referral with a chief complaint of right foot callus and left foot bunion.  The patient is seen by themselves.  The patient relates the pain is primarily located around the lateral left side of foot and lateral right side of foot.  Reports insidious onset without acute precipitating event. that has been going on for 9 year(s).  The patient has previously tried inserts with little relief.  Denies any prior history of similar issues..  The patient is currently employed as administrative.  Any previous notes and studies that pertain to the patient's condition were reviewed.    Pertinent medical, surgical and family history was reviewed in Epic chart and include lumbar degenerative disc disease    Medications: No current outpatient medications on file.     Allergies:    Allergies   Allergen Reactions     Percocet [Oxycodone-Acetaminophen] Difficulty breathing     Throat closed     Vicodin [Hydrocodone-Acetaminophen] Nausea and Vomiting       Vitals: /87   Pulse 83   Ht 1.588 m (5' 2.5\")   Wt 90.7 kg (200 lb)   LMP  (LMP Unknown)   BMI 36.00 kg/m    BMI= Body mass index is 36 kg/m .    LOWER EXTREMITY PHYSICAL EXAM    Dermatologic: Skin is intact to right and left lower extremity without significant lesions, rash or abrasion.        Vascular: DP & PT pulses are intact & regular on the right and left.   CFT and skin temperature is normal to the right and left lower extremity.     Neurologic: Lower extremity sensation is intact to light touch.  No evidence of weakness in the right and left lower extremity.        Musculoskeletal: Patient is ambulatory without assistive device or brace.  No gross ankle deformity noted.  No foot or ankle joint effusion is noted.  Noted pain on palpation on the plantar aspect of the fourth metatarsal head on the left.  No surrounding erythema noted.  Noted palpable soft tissue mass in the " area.    Diagnostics:  Radiographs included three views of the left foot demonstrating  no cortical erosions or periosteal elevation.  All joint margins appear stable.  There is no apparent fracture or tumor formation noted.  There is no evidence of foreign body.  The images were independently reviewed by myself along with the patient explaining the findings.      ASSESSMENT / PLAN:     ICD-10-CM    1. Callus of foot  L84    2. Left foot pain  M79.672 XR Foot Left G/E 3 Views   3. Tailor's bunionette, left  M21.622 XR Foot Left G/E 3 Views   4. Mass of soft tissue of foot  M79.89 MR Foot Left w/o Contrast    left foot sub fourth metatarsal head   5. Tendinitis of left foot  M77.52 MR Foot Left w/o Contrast       I have explained to Paulette about the conditions.  We discussed the underlying contributing factors to the condition as well as treatment options along with expected length of recovery.  At this time, the patient was ordered an MRI of the left foot for further evaluation of soft tissue mass on the plantar aspect of the left foot.  The patient will return to the office in approximately 1 week after obtaining the MRI exam to go over the results and further treatment options.    Paulette verbalized agreement with and understanding of the rational for the diagnosis and treatment plan.  All questions were answered to best of my ability and the patient's satisfaction. The patient was advised to contact the clinic with any questions that may arise after the clinic visit.      Disclaimer: This note consists of symbols derived from keyboarding, dictation and/or voice recognition software. As a result, there may be errors in the script that have gone undetected. Please consider this when interpreting information found in this chart.       NATALIE Crook D.P.M., FROSE MARY.F.A.S.

## 2021-10-13 ENCOUNTER — HOSPITAL ENCOUNTER (OUTPATIENT)
Dept: PHYSICAL THERAPY | Facility: CLINIC | Age: 56
Setting detail: THERAPIES SERIES
End: 2021-10-13
Attending: OTOLARYNGOLOGY
Payer: COMMERCIAL

## 2021-10-13 DIAGNOSIS — H90.12 CONDUCTIVE HEARING LOSS OF LEFT EAR WITH UNRESTRICTED HEARING OF RIGHT EAR: ICD-10-CM

## 2021-10-13 DIAGNOSIS — Z98.890 HISTORY OF EAR SURGERY: ICD-10-CM

## 2021-10-13 DIAGNOSIS — Z98.890 HISTORY OF ENDOSCOPIC SINUS SURGERY: ICD-10-CM

## 2021-10-13 PROCEDURE — 97161 PT EVAL LOW COMPLEX 20 MIN: CPT | Mod: GP | Performed by: PHYSICAL THERAPIST

## 2021-10-13 PROCEDURE — 95992 CANALITH REPOSITIONING PROC: CPT | Mod: GP | Performed by: PHYSICAL THERAPIST

## 2021-10-13 NOTE — PROGRESS NOTES
"   10/13/21 0800   Quick Adds   Quick Adds Vestibular Eval   Type of Visit Initial OP PT Evaluation   General Information   Start of Care Date 10/13/21   Referring Physician James Villanueva   Orders Evaluate and Treat as Indicated   Order Date 10/06/21   Medical Diagnosis Conductive hearing loss of left ear with unrestricted hearing of right ear , history of ear surgery, History of endoscopic sinus surgery    Onset of illness/injury or Date of Surgery 09/13/21   Precautions/Limitations no known precautions/limitations   Surgical/Medical history reviewed Yes   Pertinent history of current problem (include personal factors and/or comorbidities that impact the POC) Has had a history of several surgeries for the ear with the most recent being 15 years ago. More recently has been having some dizziness with rolling in bed. This started about a month ago. [place] like the room was spinning. It was really bad for about a week and now it feels more she is \"off balance.\"  Not sure which way she rolls though \"thinks it's more to the left\" Only lasts a few seconds.    Patient/Family Goals Statement improve dizziness feeling    General Information Comments PMH: several L ear surgeries + 15 years ago    Fall Risk Screen   Fall screen completed by PT   Have you fallen 2 or more times in the past year? No   Have you fallen and had an injury in the past year? No   Is patient a fall risk? No   Abuse Screen (yes response referral indicated)   Feels Unsafe at Home or Work/School no   Feels Threatened by Someone no   Does Anyone Try to Keep You From Having Contact with Others or Doing Things Outside Your Home? no   Physical Signs of Abuse Present no   System Outcome Measures   Outcome Measures BPPV   Dizziness Handicap Inventory (score out of 100) A decrease in score by 17.18 or greater indicates a clinically significant change in symptoms. 16   Gait Special Tests   Gait Special Tests DYNAMIC GAIT INDEX   Gait Special Tests Dynamic Gait Index "   Score out of 24 12/12   Comments 4 item DGI    Balance Special Tests   Balance Special Tests Modified CTSIB Conditions   Balance Special Tests Modified CTSIB Conditions   Condition 1, seconds 30 Seconds   Condition 2, seconds 30 Seconds   Condition 4, seconds 5 Seconds   Condition 5, seconds 0 Seconds   Cervicogenic Screen   Neck ROM WNL but a little dizzy with  looking down    Vertebral Artery Test Normal   Oculomotor Exam   Smooth Pursuit Normal   Saccades Abnormal   Saccades Comments horizontal normal,  vertical: feels dizzy with looking down and can't move as quickly with eyes    VOR Normal   VOR Cancellation Normal   Rapid Head Thrust Normal   Rapid Head Thrust Comments felt a little dizzy after    Convergence Testing Normal   Infrared Goggle Exam or Frenzel Lense Exam   Vestibular Suppressant in Last 24 Hours? No   Exam completed with Infrared Goggles   Spontaneous Nystagmus Negative   Gaze Evoked Nystagmus Negative   Head Shake Horizontal Nystagmus Negative   Malathi-Hallpike (right) Negative   [place]-Hallpike (Left) Upbeating L torsional   Malathi-Hallpike (left) comments lasting less than 10 seconds    HSCC Supine Roll Test (Right) Negative   HSCC Supine Roll Test (Left) Negative   BPPV Canal(s) L Posterior   BPPV Type Canalithasis   Dynamic Visual Acuity (DVA)   Static Acuity (LogMar) line 7   Horizontal Head Movement at 1 Hz (LogMar) line 7    Horizontal Head Movement at 2 Hz (LogMar) line 6    Planned Therapy Interventions   Planned Therapy Interventions balance training;neuromuscular re-education;other (see comments)  (standard canaltih repositioning )   Clinical Impression   Criteria for Skilled Therapeutic Interventions Met yes, treatment indicated   PT Diagnosis BPPV left posterior canalithiasis    Influenced by the following impairments dizziness, + malathi halpike L    Functional limitations due to impairments rolling in bed, looking down   Clinical Presentation Stable/Uncomplicated   Clinical Presentation  Rationale clinical decision making and chart review    Clinical Decision Making (Complexity) Low complexity   Therapy Frequency 2 times/Week   Predicted Duration of Therapy Intervention (days/wks) 4 weeks   Risk & Benefits of therapy have been explained Yes   Patient, Family & other staff in agreement with plan of care Yes   Clinical Impression Comments Paulette is a 56 year old female who presents to therapy with BPPV L posterior canalithiasis.    Education Assessment   Preferred Learning Style Listening;Demonstration;Pictures/video   Barriers to Learning No barriers   GOALS   PT Eval Goals 1;2;3   Goal 1   Goal Identifier 1   Goal Description Patient will be able to roll in bed with no dizziness.    Target Date 10/27/21   Goal 2   Goal Identifier 2   Goal Description Patient will be able to look down and bend over without dizziness.    Target Date 10/27/21   Goal 3   Goal Identifier 3   Goal Description Patient will improve DHI to less than 4/100 in order to show improved symptoms of dizziness.    Target Date 11/10/21   Total Evaluation Time   PT Mary, Low Complexity Minutes (03819) 25       Vickie Michelle  PT, DPT       10/13/2021   [address] [address]  [email or web address]  Voicemail: [phone]

## 2021-10-29 ENCOUNTER — HOSPITAL ENCOUNTER (OUTPATIENT)
Dept: PHYSICAL THERAPY | Facility: CLINIC | Age: 56
Setting detail: THERAPIES SERIES
End: 2021-10-29
Attending: OTOLARYNGOLOGY
Payer: COMMERCIAL

## 2021-10-29 PROCEDURE — 95992 CANALITH REPOSITIONING PROC: CPT | Mod: GP | Performed by: PHYSICAL THERAPIST

## 2021-11-19 ENCOUNTER — HOSPITAL ENCOUNTER (OUTPATIENT)
Dept: MRI IMAGING | Facility: CLINIC | Age: 56
Discharge: HOME | End: 2021-11-19
Attending: PODIATRIST | Admitting: PODIATRIST
Payer: COMMERCIAL

## 2021-11-19 ENCOUNTER — OFFICE VISIT (OUTPATIENT)
Dept: AUDIOLOGY | Facility: CLINIC | Age: 56
End: 2021-11-19
Payer: COMMERCIAL

## 2021-11-19 DIAGNOSIS — M77.52 TENDINITIS OF LEFT FOOT: ICD-10-CM

## 2021-11-19 DIAGNOSIS — M79.89 MASS OF SOFT TISSUE OF FOOT: ICD-10-CM

## 2021-11-19 DIAGNOSIS — H90.12 CONDUCTIVE HEARING LOSS OF LEFT EAR WITH UNRESTRICTED HEARING OF RIGHT EAR: Primary | ICD-10-CM

## 2021-11-19 PROCEDURE — 92550 TYMPANOMETRY & REFLEX THRESH: CPT | Performed by: AUDIOLOGIST

## 2021-11-19 PROCEDURE — 99207 PR NO CHARGE LOS: CPT | Performed by: AUDIOLOGIST

## 2021-11-19 PROCEDURE — 92557 COMPREHENSIVE HEARING TEST: CPT | Performed by: AUDIOLOGIST

## 2021-11-19 PROCEDURE — 73718 MRI LOWER EXTREMITY W/O DYE: CPT | Mod: LT

## 2021-11-19 PROCEDURE — 73718 MRI LOWER EXTREMITY W/O DYE: CPT | Mod: 26 | Performed by: RADIOLOGY

## 2021-11-19 NOTE — PROGRESS NOTES
"AUDIOLOGY REPORT    SUBJECTIVE:  Paulette Flores is a 56 year old female who was seen in the Audiology Clinic at the [facility] for audiologic evaluation, self-referred. The patient has been seen previously in this clinic on 6/24/2020 for assessment and results indicated normal hearing sensitivity right ear and mild sloping to severe conductive hearing loss left ear. The patient reports a \"split\" feeling and is wondering if a hearing aid might help. She has history of multiple middle ear surgeries left ear. She continues to have vertigo with position changes, lying down in bed, and with looking up and down. She has gone to physical therapy but symptoms have not resolved. They were unaccompanied today.    OBJECTIVE:  Otoscopic exam indicates ears are clear of cerumen bilaterally     Pure Tone Thresholds assessed using conventional audiometry with good reliability from 250-8000 Hz bilaterally using circumaural headphones       RIGHT:  normal sloping to mild high frequency hearing loss, right ear is normal from 250-4k    LEFT:    mild sloping to moderate-severe conductive hearing loss    Tympanogram:    RIGHT: normal eardrum mobility    LEFT:   normal eardrum mobility    Reflexes (reported by stimulus ear):  RIGHT: Ipsilateral is absent at frequencies tested  RIGHT: Contralateral is absent at frequencies tested  LEFT:   Ipsilateral is absent at frequencies tested  LEFT:   Contralateral is absent at frequencies tested    Speech Reception Threshold:    RIGHT: 5 dB HL    LEFT:   30 dB HL    Word Recognition Score:     RIGHT: 100% at 45 dB HL using NU-6 recorded word list.    LEFT:   96% at 70 dB HL using NU-6 recorded word list.      ASSESSMENT:     ICD-10-CM    1. Conductive hearing loss of left ear with unrestricted hearing of right ear  H90.12 Cmprhn Audiometry Thrshld Eval & Speech Recog (89180)     Tymps / Reflex   (85044)     Compared to patient's previous audiogram dated 6/24/2020, hearing has " remained stable. Today s results were discussed with the patient in detail.     PLAN:    Patient was counseled regarding hearing loss and impact on communication.  Patient is a good candidate for amplification at this time. It is recommended that the patient schedule an ENT consult to evaluation continued vertigo and a hearing aid consultation.  Please call this clinic with questions regarding these results or recommendations.        Dionte Rico Licensed Audiologist #[id number]

## 2021-12-03 ENCOUNTER — OFFICE VISIT (OUTPATIENT)
Dept: PODIATRY | Facility: CLINIC | Age: 56
End: 2021-12-03
Payer: COMMERCIAL

## 2021-12-03 VITALS
WEIGHT: 200 LBS | HEART RATE: 73 BPM | SYSTOLIC BLOOD PRESSURE: 125 MMHG | DIASTOLIC BLOOD PRESSURE: 82 MMHG | HEIGHT: 63 IN | BODY MASS INDEX: 35.44 KG/M2

## 2021-12-03 DIAGNOSIS — M79.89 MASS OF SOFT TISSUE OF FOOT: Primary | ICD-10-CM

## 2021-12-03 PROCEDURE — 99213 OFFICE O/P EST LOW 20 MIN: CPT | Performed by: PODIATRIST

## 2021-12-03 ASSESSMENT — MIFFLIN-ST. JEOR: SCORE: 1448.38

## 2021-12-03 NOTE — NURSING NOTE
"Chief Complaint   Patient presents with     RECHECK     MRI results       Initial /82   Pulse 73   Ht 1.588 m (5' 2.5\")   Wt 90.7 kg (200 lb)   LMP  (LMP Unknown)   BMI 36.00 kg/m   Estimated body mass index is 36 kg/m  as calculated from the following:    Height as of this encounter: 1.588 m (5' 2.5\").    Weight as of this encounter: 90.7 kg (200 lb).  Medications and allergies reviewed.      Cata EMANUEL MA    "

## 2021-12-03 NOTE — PROGRESS NOTES
"Paulette returns to the office for review of the recent MRI and reevaluation of the right foot.     The patient relates no other problems.    Vitals: /82   Pulse 73   Ht 1.588 m (5' 2.5\")   Wt 90.7 kg (200 lb)   LMP  (LMP Unknown)   BMI 36.00 kg/m    BMI= Body mass index is 36 kg/m .    Lower Extremity Physical Exam:      Neurovascular status remains unchanged.  Muscular exam is within normal limits to major muscle groups.  Integument is intact.      Noted pain on palpation of the plantar aspect of the right forefoot.  No surrounding erythema or edema noted.    Diagnostics:  MR left foot without contrast 11/19/2021 10:59 AM    History: Foot pain, chronic, tendinopathy suspected, xray done; Mass  of soft tissue of foot; Tendinitis of left foot    Techniques: Multiplanar multisequence imaging of the left foot was  obtained without administration of intravenous contrast.    Comparison: Radiographs 10/8/2021    Findings: Marker noted plantar to the fourth metatarsal phalangeal  joint.    Bones    No fracture, marrow contusion or marrow infiltrative changes.    Joints and periarticular soft tissue    Joint effusion: Physiologic amount of joint fluid are present.    Plantar plates: Intersesamoidal ligament and sesamoidal phalangeal  ligaments of the first metatarsophalangeal joints are grossly intact.  Plantar plates of the second through fifth toe at metatarsophalangeal  joints are grossly intact.    Intermetatarsal spaces: Soft tissue intensity signal in the third  interspace measuring up to 4 mm transverse and 10 mm plantar dorsal.  Partial loss of plantar fat pad homogeneity plantar to the fourth  metatarsophalangeal joint comment proximity to the marker. No  substantial intermetatarsal fluid.    Ligaments and Tendons    Lisfranc interosseous ligament: Intact.    Tendons: Grossly intact.    Muscles    No atrophy or edema.    ANCILLARY FINDINGS    None    Impression:    1. Soft tissue intensity in the third " interspace measuring up to 4 x  10 mm suspicious for a small Pina's neuroma.    2. Loss of normal plantar fat pad plantar to the fourth  metatarsophalangeal joint, in proximity to patient's marker. This  appears to be callus versus less likely adventitial bursa.    BARRON (Mau FLORENCE MD         Assessment:     ICD-10-CM    1. Mass of soft tissue of foot  M79.89        Plan:    I have explained to Paulette about the progress of the conditions.  At this time, I recommended continue offloading with silicone shoe inserts for added padding to the forefoot on the right.  Patient may return if symptoms persist or worsen.    Paulette verbalized agreement with and understanding of the rational for the diagnosis and treatment plan.  All questions were answered to best of my ability and the patient's satisfaction. The patient was advised to contact the clinic with any questions that may arise after the clinic visit.      Disclaimer: This note consists of symbols derived from keyboarding, dictation and/or voice recognition software. As a result, there may be errors in the script that have gone undetected. Please consider this when interpreting information found in this chart.       NATALIE Crook D.P.M., F.JAMILA.C.F.A.S.

## 2021-12-03 NOTE — PATIENT INSTRUCTIONS
Calluses of the Foot    I.  Calluses on the toes   A.  Tips of the toes    1.  Due to pressure on the tips of the toes when wearing shoes, sandals or barefoot.    2.  Related to hammertoe deformity of the toes.    3.  Treated with wearing soft cushioned toe caps or shoe liners to better offload the pressure to the tips of the toes    4.  Correcting the deformity of the toe is another option if cushions do not help   B.  Top of sides of the knuckles of the toes    1.  Due to pressure from adjacent toes or shoes on the knuckles of the toes.    2.  Can be related to hammertoe deformities    3.  Treated with wearing roomy shoes with soft top-cover material in order not to rub on the skin    4.  Silicone toe caps can also be used to protect rubbing from the knuckles of the adjacent toes.    5.  Correcting the deformity of the toe is another option if offloading measures do not work.  II. Calluses on the bottom of the foot   A.   Pressure points    1.  Caused by direct pressure overlying prominent bones such as metatarsal head on the balls of the foot.    2.  Can be related to either hammertoe deformities or fat pad atrophy    3.  Can be treated with additional cushion utilizing silicone shoe liners to better offload the pressure and supplement for fat pad atrophy.    4.  Surgical correction of hammertoe deformities is another option if offloading cushion treatment does not work.   B.   Shear forces    1.  Caused by sliding forces along the skin on the bottom of the forefoot including the great toe.    2.  This is not due to a rotational force as the foot pushes off against the ground.    3.  Treated with wearing a silicone shoe liner that can move with the skin reducing the amount of shear force causing the callus formation.   C.   IPK lesions or porokeratosis    1.  These are small hard callus lesions that are related to plugged sweat ducts.    2.  These ducts travel through the epidermis and dermis residing in the  subcutaneous tissue    3.  When the ducts get clogged, they can harden up resulting in callused skin around them.      4.  Treatment includes sharp excavation of the hyperkeratotic callus tissue core as far as can be tolerated, preferably without bleeding.    5.   Other treatment options   A.  Application of salicylic acid to soften the hyperkeratotic skin   B.  Cantharone which causes a blister in attempt to pull off the hyperkeratotic skin lesion.    Helpful products:    Soles  Silicone Shoe Inserts    Amazon: https://www.[employer]/Soles-Silicone-Orthopedic-Comfortable-Hypoallergenic/dp/D99OSDX6UR/ref=sr_1_2?dchild=1&keywords=silicone+sole&sgd=3693906450&sr=8-2    Dr. Cadena's Gels Toe Caps      Sold at [facility]    Amazon: https://www.[employer]/Benito-All-Mini-Size/dp/C98SJQLW10/ref=sr_1_2?crid=[email or web address]&dchild=1&keywords=+myrons+all+gel+toe+cap&puu=7254102622&sprefix=.+Myron%27s+gels+toe%2Caps%2C171&sr=8-2    SMOKING CESSATION  What's in cigarette smoke? - Cigarette smoke contains over 4,000 chemicals. Nicotine is one of the main ingredients which is an insecticide/herbicide. It is poisonous to our nervous system, increases blood clotting risk, and decreases the body's defenses to fight off infection. Another chemical is Carbon Monoxide is an asphyxiating gas that permanently binds to blood cells and blocks the transport of oxygen. This leads to tissue death and decreases your metabolism. Tar is a chemical that coats your lungs and trachea which impairs new oxygen coming in and carbon dioxide getting out of your body.   How does smoking impact surgery? - Smoking is particularly hazardous with regards to surgery. Surgery puts stress on the body and a smoker's body is already under strain from these chemicals. Putting the two together, especially for an elective surgery, could be a recipe for disaster. Smoking before and after surgery increases your risk of  heart problems, slow wound healing, delayed bone healing, blood clots, wound infection and anesthesia complications.   What are the benefits of quitting? - In 20 minutes your blood pressure will drop back down to normal. In 8 hours the carbon monoxide (a toxic gas) levels in your blood stream will drop by half, and oxygen levels will return to normal. In 48 hours your chance of having a heart attack will have decreased. All nicotine will have left your body. Your sense of taste and smell will return to a normal level. In 72 hours your bronchial tubes will relax, and your energy levels will increase. In 2 weeks your circulation will increase, and it will continue to improve for the next 10 weeks.    Recommendations for elective surgery - Ideally, patients should quit smoking 8 weeks before and at least 2 weeks after elective surgery in order to avoid complications. Simply cutting back on the amount of cigarettes smoked per day does not offer any benefit or decrease the risk of poor wound healing, heart problems, and infection. Smokers should also start taking Vitamin C and B for two weeks before surgery and two weeks after surgery.    Ways to Stop Smokin. Nicotine patches, lozenges, or gum  2. Support Groups  3. Medications (see below)    List of Medications:  1. Varenicline Tartrate (CHANTIX) (may be discontinued by FDA as of 2021)  2. Bupropion HCL (WELLBUTRIN, ZYBAN) - note: make sure Wellbutrin is for smoking cessation and not other issues   3. Nicotine Patch (NICODERM)   4. Nicotine Inhaler (NICOTROL)   5. Nicotine Gum Nicotine Polacrilex   6. Nicotine Lozenge: Nicotine Polacrilex (COMMIT)   * [place] offers a smoking support group as well!  Please visit: [email or web address]."[employer]/join/[email or web address]  If you are interested in these, ask about getting a prescription or talk to your primary care doctor about what may be the best way for you to quit.

## 2021-12-03 NOTE — LETTER
"    12/3/2021         RE: Paulette Flores  [address]  [place] [address]        Dear Colleague,    Thank you for referring your patient, Paulette Flores, to the [facility] [place]. Please see a copy of my visit note below.    Paulette returns to the office for review of the recent MRI and reevaluation of the right foot.     The patient relates no other problems.    Vitals: /82   Pulse 73   Ht 1.588 m (5' 2.5\")   Wt 90.7 kg (200 lb)   LMP  (LMP Unknown)   BMI 36.00 kg/m    BMI= Body mass index is 36 kg/m .    Lower Extremity Physical Exam:      Neurovascular status remains unchanged.  Muscular exam is within normal limits to major muscle groups.  Integument is intact.      Noted pain on palpation of the plantar aspect of the right forefoot.  No surrounding erythema or edema noted.    Diagnostics:  MR left foot without contrast 11/19/2021 10:59 AM    History: Foot pain, chronic, tendinopathy suspected, xray done; Mass  of soft tissue of foot; Tendinitis of left foot    Techniques: Multiplanar multisequence imaging of the left foot was  obtained without administration of intravenous contrast.    Comparison: Radiographs 10/8/2021    Findings: Marker noted plantar to the fourth metatarsal phalangeal  joint.    Bones    No fracture, marrow contusion or marrow infiltrative changes.    Joints and periarticular soft tissue    Joint effusion: Physiologic amount of joint fluid are present.    Plantar plates: Intersesamoidal ligament and sesamoidal phalangeal  ligaments of the first metatarsophalangeal joints are grossly intact.  Plantar plates of the second through fifth toe at metatarsophalangeal  joints are grossly intact.    Intermetatarsal spaces: Soft tissue intensity signal in the third  interspace measuring up to 4 mm transverse and 10 mm plantar dorsal.  Partial loss of plantar fat pad homogeneity plantar to the fourth  metatarsophalangeal joint comment proximity to the marker. " No  substantial intermetatarsal fluid.    Ligaments and Tendons    Lisfranc interosseous ligament: Intact.    Tendons: Grossly intact.    Muscles    No atrophy or edema.    ANCILLARY FINDINGS    None    Impression:    1. Soft tissue intensity in the third interspace measuring up to 4 x  10 mm suspicious for a small Pina's neuroma.    2. Loss of normal plantar fat pad plantar to the fourth  metatarsophalangeal joint, in proximity to patient's marker. This  appears to be callus versus less likely adventitial bursa.    BARRON FLORENCE MD (Joe)         Assessment:     ICD-10-CM    1. Mass of soft tissue of foot  M79.89        Plan:    I have explained to Paulette about the progress of the conditions.  At this time, I recommended continue offloading with silicone shoe inserts for added padding to the forefoot on the right.  Patient may return if symptoms persist or worsen.    Paulette verbalized agreement with and understanding of the rational for the diagnosis and treatment plan.  All questions were answered to best of my ability and the patient's satisfaction. The patient was advised to contact the clinic with any questions that may arise after the clinic visit.      Disclaimer: This note consists of symbols derived from keyboarding, dictation and/or voice recognition software. As a result, there may be errors in the script that have gone undetected. Please consider this when interpreting information found in this chart.       NATALIE Crook D.P.M., F.A.C.F.A.S.        Again, thank you for allowing me to participate in the care of your patient.        Sincerely,        Rafi Crook DPM

## 2022-02-01 NOTE — PROGRESS NOTES
[facility]    Outpatient Physical Therapy Discharge Note  Patient: Paulette Flores  : 1965    Beginning/End Dates of Reporting Period:  10/13/21 to 10/29/21    Referring Provider: James James Diagnosis: left BPPV posterior canalithiasis      Client Self Report: Things felt great after last PT session. Not having any symptoms with rolling in bed. Last night, started to get spinning sensations really bad with rolling to the right. Spinning sensation lasting about 3 minutes.     Objective Measurements:  Objective Measure: cynthia halpike   Details:  + L , - R      Goals:  Goal Identifier 1   Goal Description Patient will be able to roll in bed with no dizziness.    Target Date 10/27/21   Date Met      Progress (detail required for progress note):  Still dizzy but with treatment from last visit this should have resolved.      Goal Identifier 2   Goal Description Patient will be able to look down and bend over without dizziness.    Target Date 10/27/21   Date Met      Progress (detail required for progress note):  Still dizzy but with treatment from last visit this should have resolved.      Goal Identifier 3   Goal Description Patient will improve DHI to less than 4/100 in order to show improved symptoms of dizziness.    Target Date 11/10/21   Date Met      Progress (detail required for progress note):  Not assessed       Progress towards Goals:   Progress this reporting period: All information listed above was at patient's last attended PT visit. At her last attended session, patient was treated for left sided BPPV and was instructed to f/u only if symptoms continued.  Patient did not return and it is assumed patient's symptoms have since resolved.    Plan:  Discharge from therapy.    Discharge:    Reason for Discharge: Patient has failed to schedule further appointments.    Equipment Issued:  none    Discharge Plan:  Not completed since patient failed to schedule further appointments.    Vickie Michelle  PT, DPT       2/1/2022   [address] [address]  [email or web address]  Voicemail: [phone]

## 2022-02-18 ENCOUNTER — OFFICE VISIT (OUTPATIENT)
Dept: OTOLARYNGOLOGY | Facility: CLINIC | Age: 57
End: 2022-02-18
Payer: COMMERCIAL

## 2022-02-18 ENCOUNTER — TELEPHONE (OUTPATIENT)
Dept: OTOLARYNGOLOGY | Facility: CLINIC | Age: 57
End: 2022-02-18
Payer: COMMERCIAL

## 2022-02-18 VITALS
WEIGHT: 200 LBS | TEMPERATURE: 98.2 F | HEART RATE: 76 BPM | BODY MASS INDEX: 36 KG/M2 | DIASTOLIC BLOOD PRESSURE: 87 MMHG | SYSTOLIC BLOOD PRESSURE: 131 MMHG

## 2022-02-18 DIAGNOSIS — H61.21 IMPACTED CERUMEN OF RIGHT EAR: ICD-10-CM

## 2022-02-18 DIAGNOSIS — H92.01 OTALGIA, RIGHT: Primary | ICD-10-CM

## 2022-02-18 DIAGNOSIS — M26.609 TEMPORAL MANDIBULAR JOINT DISORDER: ICD-10-CM

## 2022-02-18 DIAGNOSIS — H90.12 CONDUCTIVE HEARING LOSS OF LEFT EAR WITH UNRESTRICTED HEARING OF RIGHT EAR: ICD-10-CM

## 2022-02-18 DIAGNOSIS — Z98.890 HISTORY OF EAR SURGERY: ICD-10-CM

## 2022-02-18 PROCEDURE — 69210 REMOVE IMPACTED EAR WAX UNI: CPT | Performed by: OTOLARYNGOLOGY

## 2022-02-18 PROCEDURE — 99213 OFFICE O/P EST LOW 20 MIN: CPT | Mod: 25 | Performed by: OTOLARYNGOLOGY

## 2022-02-18 ASSESSMENT — PAIN SCALES - GENERAL: PAINLEVEL: SEVERE PAIN (7)

## 2022-02-18 NOTE — TELEPHONE ENCOUNTER
"Right ear \"has a pain in right ear\". (not the left)       Patient states has been a few years since she had this and it is usually prompted by being in wind without protecting them with cotton or such.  Taking NSAID q 4 hours without relief and knows at this point it has turned into an ear infection like it has in the past.  Has taken Amox for it in the past but it takes a long time with that Abx.  Pt says she thought Dr EMMANUEL told her to see him if happened again because he would try something else.    Pt reports feels \"soupy\" in the canal but is dry.  Pain is deep.      Positive for:  Will not longer insufflate or \"pop\".  Pain not responding to OTC  Feeling full behind the drum.    Negative for:  Fever  Drainage  Odor  Swelling or redness  Denies any hearing changes      Due to impending weather- if pt can be seen today; would like it to be closer to noon as possible.  If needs to go to [facility] urgent care then would like to know as soon as possible so she can go before weather turns.    Please advise.    Mckenna WAYNE   Specialty Clinic RN    "

## 2022-02-18 NOTE — PATIENT INSTRUCTIONS
Per physician's instructions    Temporal mandibular joint dysfunction instructions    For the next 2 weeks:  1. Soft diet  2. No chewing gum  3. Use ibuprofen 400-600 mg every 4-6 hours as needed  4. Warm compresses over jaw joint in front of ears  5. Use a bite guard at night, consider Galileo's Grind-No-More which is available OTC. May also discuss with dentist to make a oral appliance.     TMJ exercises:  1. Close your mouth to touch your upper and lower teeth without clenching them. Rest the tip of your tongue on your palate behind your upper teeth.  2. Slide the tip of your tongue backwards toward the throat as far as you can, keeping your teeth together.  3. Try to touch the soft palate with the tip of your tongue, and keep it there. Then slowly open your mouth until you feel your tongue is being pulled away from the soft palate. Keep your mouth open in this position for five seconds before closing your mouth to relax.  4. Repeat the above steps slowly for 5 minutes. You may want to check in a mirror to be sure your teeth are closed aligned straight up and down, without your jaw being off to one side.     Do the exercises twice daily for the first 2 weeks, but then you can do it more often if it seems to help. You shouldn't hear any clicks or noise from your joints, and if you do then you should restart the exercise rather than opening the mouth further. With practice, you will be able to open further without having clicking.

## 2022-02-18 NOTE — LETTER
2/18/2022         RE: Paulette Flores  [address]  [place] [address]        Dear Colleague,    Thank you for referring your patient, Paulette Flores, to the [facility]. Please see a copy of my visit note below.    Chief Complaint   Patient presents with     Ear Problem     c/o pain in right ear - noted for about 2 days rates 7/10     History of Present Illness  Paulette Flores is a 56 year old female old female who presents today for ear evaluation.  I evaluated the patient on 6/24/2020 with ear and sinus concerns.  I cleaned her ears at that visit.  She presents today for repeat ear exam and ear cleaning. The patient denies significant hearing changes.  She has had multiple surgeries for what sounds like was patulous eustachian tube initially.  She did developed middle ear pathology and what sounds like retraction or tympanic membrane perforation.  She states has had 6 surgeries in her left ear.  The most recent surgery was over 15 years ago. He was last seen in the fall for follow-up examination and ear cleaning. She contacted our office with a recent onset of pain in the opposite ear. She was concerned for possible ear infection.    The patient states that she usually does not have problem in her right ear. She is having otalgia deep in the right ear. She denies any otorrhea, bloody otorrhea, decreased hearing. No dizziness or vertigo. No significant tinnitus. She has had orthodontia twice previously, most recently she had an visible line. She occasionally wears her invisible line at night. She is a gum chewer intermittently.    The patient underwent an audiogram performed 6/24/2020.  My review of the audiogram showed normal hearing in the right ear and mild sloping to severe conductive hearing loss in the left ear.  Pure-tone average was 11 dB on the right and 33 dB on the left.  Speech reception threshold was 10 dB on the right and 30 dB on the left.  The patient had 96% word  recognition on the right and 92% word recognition on the left.  The patient had a type A tympanogram on the right and a type A tympanogram on the left.    Past Medical History  Patient Active Problem List   Diagnosis     Conductive hearing loss in left ear     DDD (degenerative disc disease), lumbar     Decreased GFR     Current Medications  No current outpatient medications on file.    Allergies  Allergies   Allergen Reactions     Percocet [Oxycodone-Acetaminophen] Difficulty breathing     Throat closed     Vicodin [Hydrocodone-Acetaminophen] Nausea and Vomiting       Social History  Social History     Socioeconomic History     Marital status:      Spouse name: None     Number of children: None     Years of education: None     Highest education level: None   Occupational History     None   Tobacco Use     Smoking status: Light Tobacco Smoker     Packs/day: 0.10     Years: 20.00     Pack years: 2.00     Types: Cigarettes     Smokeless tobacco: Never Used     Tobacco comment: trying to quit/cut down   Substance and Sexual Activity     Alcohol use: No     Alcohol/week: 0.0 standard drinks     Drug use: No     Sexual activity: Yes     Partners: Male   Other Topics Concern     Parent/sibling w/ CABG, MI or angioplasty before 65F 55M? 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28    Grandchildren     Social Determinants of Health     Financial Resource Strain: Not on file   Food Insecurity: Not on file   Transportation Needs: Not on file   Physical Activity: Not on file   Stress: Not on file   Social Connections: Not on file   Intimate Partner Violence: Not on file   Housing Stability: Not on file       Family History  Family History   Problem Relation Age of Onset     Hypertension Mother      Hypothyroidism Mother      Hypertension Father      Graves' disease Sister      Hypothyroidism Sister        Review of Systems  As per HPI and PMHx, otherwise 10 system review  including the head and neck, constitutional, eyes, respiratory, GI, skin, neurologic, lymphatic, endocrine, and allergy systems is negative.    Physical Exam  /87 (BP Location: Right arm, Patient Position: Chair, Cuff Size: Adult Large)   Pulse 76   Temp 98.2  F (36.8  C) (Tympanic)   Wt 90.7 kg (200 lb)   LMP  (LMP Unknown)   BMI 36.00 kg/m    GENERAL: Patient is a pleasant, cooperative 56 year old female in no acute distress.  HEAD: Normocephalic, atraumatic.  Hair and scalp are normal.  EYES: Pupils are equal, round, reactive to light and accommodation.  Extraocular movements are intact.  The sclera nonicteric without injection.  The extraocular structures are normal.  EARS: See below.  NOSE: Nares are patent. Nasal mucosa is pink and moist. Negative anterior rhinoscopy.  ORAL CAVITY: Mucous membranes are moist. Dentition is in good repair. Palpation of the bilateral temporomandibular joints reveals significant tenderness over the right TMJ. There is some crepitus and late clicking noted bilaterally.  NEUROLOGIC: Cranial nerves II through XII are grossly intact.  Voice is strong. Patient is House-Brackmann I/VI bilaterally.   CARDIOVASCULAR: Extremities are warm and well-perfused.  No significant peripheral edema.  RESPIRATORY: Patient has nonlabored breathing without cough, wheeze, stridor.  PSYCHIATRIC: Patient is alert and oriented.  Mood and affect appear normal.  SKIN: Warm and dry.  No scalp, face, or neck lesions noted.     Physical Exam and Procedure     EARS: Normal shape and symmetry.  No tenderness when palpating the mastoid or tragal areas bilaterally.  The ears were then examined under the otic binocular microscope to perform cerumen removal.  Otoscopic exam on the right reveals impacted cerumen down to the level of the tympanic membrane.  The cerumen was removed with a curette.  The right tympanic membrane was round, intact without evidence of effusion.  No retraction, granulation,  drainage, or evidence of cholesteatoma.       Attention was turned to the left ear.  Otoscopic exam on the left reveals a minimal amount of cerumen.  The patient's left tympanic membrane is intact.  Her landmarks are bit atypical given her previous ear surgeries.  There is a little bit of respiratory variation to her tympanic membrane.  There is no evidence of middle ear effusion, no retraction, granulation, drainage, or evidence of cholesteatoma.      Assessment and Plan     ICD-10-CM    1. Otalgia, right  H92.01    2. 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 Conductive hearing loss of left ear with unrestricted hearing of right ear  H90.12    4. History of ear surgery  Z98.890    5. Impacted cerumen of right ear  H61.21       It was my pleasure seeing Paulette Flores today in clinic. The patient presents with acute onset right-sided otalgia. She has no signs of middle ear effusion or infection on the right.  Based on today's history and physical exam, I can find no evidence of middle ear pathology or eustachian tube dysfunction.  At this point my primary diagnosis is of temporomandibular syndrome.  I have discussed the etiology of TMJ, and the reasons why referred pain can mimic symptoms of ear disease, headaches, and even sinusitis.  I have given the patient an instructional sheet of things to be tried at home. Finally, I counseled the patient that should the therapy not help, or should the symptoms change, that they should return to me, or contact me for a referral to a TMJ specialist.    James Villanueva MD  Department of Otolaryngology-Head and Neck Surgery  [facility]         Again, thank you for allowing me to participate in the care of your patient.        Sincerely,        James Villanueva MD

## 2022-02-18 NOTE — PROGRESS NOTES
Chief Complaint   Patient presents with     Ear Problem     c/o pain in right ear - noted for about 2 days rates 7/10     History of Present Illness  Paulette Flores is a 56 year old female old female who presents today for ear evaluation.  I evaluated the patient on 6/24/2020 with ear and sinus concerns.  I cleaned her ears at that visit.  She presents today for repeat ear exam and ear cleaning. The patient denies significant hearing changes.  She has had multiple surgeries for what sounds like was patulous eustachian tube initially.  She did developed middle ear pathology and what sounds like retraction or tympanic membrane perforation.  She states has had 6 surgeries in her left ear.  The most recent surgery was over 15 years ago. He was last seen in the fall for follow-up examination and ear cleaning. She contacted our office with a recent onset of pain in the opposite ear. She was concerned for possible ear infection.    The patient states that she usually does not have problem in her right ear. She is having otalgia deep in the right ear. She denies any otorrhea, bloody otorrhea, decreased hearing. No dizziness or vertigo. No significant tinnitus. She has had orthodontia twice previously, most recently she had an visible line. She occasionally wears her invisible line at night. She is a gum chewer intermittently.    The patient underwent an audiogram performed 6/24/2020.  My review of the audiogram showed normal hearing in the right ear and mild sloping to severe conductive hearing loss in the left ear.  Pure-tone average was 11 dB on the right and 33 dB on the left.  Speech reception threshold was 10 dB on the right and 30 dB on the left.  The patient had 96% word recognition on the right and 92% word recognition on the left.  The patient had a type A tympanogram on the right and a type A tympanogram on the left.    Past Medical History  Patient Active Problem List   Diagnosis     Conductive hearing loss in  left ear     DDD (degenerative disc disease), lumbar     Decreased GFR     Current Medications  No current outpatient medications on file.    Allergies  Allergies   Allergen Reactions     Percocet [Oxycodone-Acetaminophen] Difficulty breathing     Throat closed     Vicodin [Hydrocodone-Acetaminophen] Nausea and Vomiting       Social History  Social History     Socioeconomic History     Marital status:      Spouse name: None     Number of children: None     Years of education: None     Highest education level: None   Occupational History     None   Tobacco Use     Smoking status: Light Tobacco Smoker     Packs/day: 0.10     Years: 20.00     Pack years: 2.00     Types: Cigarettes     Smokeless tobacco: Never Used     Tobacco comment: trying to quit/cut down   Substance and Sexual Activity     Alcohol use: No     Alcohol/week: 0.0 standard drinks     Drug use: No     Sexual activity: Yes     Partners: Male   Other Topics Concern     Parent/sibling w/ CABG, MI or angioplasty before 65F 55M? 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28    Grandchildren     Social Determinants of Health     Financial Resource Strain: Not on file   Food Insecurity: Not on file   Transportation Needs: Not on file   Physical Activity: Not on file   Stress: Not on file   Social Connections: Not on file   Intimate Partner Violence: Not on file   Housing Stability: Not on file       Family History  Family History   Problem Relation Age of Onset     Hypertension Mother      Hypothyroidism Mother      Hypertension Father      Graves' disease Sister      Hypothyroidism Sister        Review of Systems  As per HPI and PMHx, otherwise 10 system review including the head and neck, constitutional, eyes, respiratory, GI, skin, neurologic, lymphatic, endocrine, and allergy systems is negative.    Physical Exam  /87 (BP Location: Right arm, Patient Position: Chair, Cuff Size: Adult Large)   Pulse  76   Temp 98.2  F (36.8  C) (Tympanic)   Wt 90.7 kg (200 lb)   LMP  (LMP Unknown)   BMI 36.00 kg/m    GENERAL: Patient is a pleasant, cooperative 56 year old female in no acute distress.  HEAD: Normocephalic, atraumatic.  Hair and scalp are normal.  EYES: Pupils are equal, round, reactive to light and accommodation.  Extraocular movements are intact.  The sclera nonicteric without injection.  The extraocular structures are normal.  EARS: See below.  NOSE: Nares are patent. Nasal mucosa is pink and moist. Negative anterior rhinoscopy.  ORAL CAVITY: Mucous membranes are moist. Dentition is in good repair. Palpation of the bilateral temporomandibular joints reveals significant tenderness over the right TMJ. There is some crepitus and late clicking noted bilaterally.  NEUROLOGIC: Cranial nerves II through XII are grossly intact.  Voice is strong. Patient is House-Brackmann I/VI bilaterally.   CARDIOVASCULAR: Extremities are warm and well-perfused.  No significant peripheral edema.  RESPIRATORY: Patient has nonlabored breathing without cough, wheeze, stridor.  PSYCHIATRIC: Patient is alert and oriented.  Mood and affect appear normal.  SKIN: Warm and dry.  No scalp, face, or neck lesions noted.     Physical Exam and Procedure     EARS: Normal shape and symmetry.  No tenderness when palpating the mastoid or tragal areas bilaterally.  The ears were then examined under the otic binocular microscope to perform cerumen removal.  Otoscopic exam on the right reveals impacted cerumen down to the level of the tympanic membrane.  The cerumen was removed with a curette.  The right tympanic membrane was round, intact without evidence of effusion.  No retraction, granulation, drainage, or evidence of cholesteatoma.       Attention was turned to the left ear.  Otoscopic exam on the left reveals a minimal amount of cerumen.  The patient's left tympanic membrane is intact.  Her landmarks are bit atypical given her previous ear  surgeries.  There is a little bit of respiratory variation to her tympanic membrane.  There is no evidence of middle ear effusion, no retraction, granulation, drainage, or evidence of cholesteatoma.      Assessment and Plan     ICD-10-CM    1. Otalgia, right  H92.01    2. 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 Conductive hearing loss of left ear with unrestricted hearing of right ear  H90.12    4. History of ear surgery  Z98.890    5. Impacted cerumen of right ear  H61.21       It was my pleasure seeing Paulette Flores today in clinic. The patient presents with acute onset right-sided otalgia. She has no signs of middle ear effusion or infection on the right.  Based on today's history and physical exam, I can find no evidence of middle ear pathology or eustachian tube dysfunction.  At this point my primary diagnosis is of temporomandibular syndrome.  I have discussed the etiology of TMJ, and the reasons why referred pain can mimic symptoms of ear disease, headaches, and even sinusitis.  I have given the patient an instructional sheet of things to be tried at home. Finally, I counseled the patient that should the therapy not help, or should the symptoms change, that they should return to me, or contact me for a referral to a TMJ specialist.    James Villanueva MD  Department of Otolaryngology-Head and Neck Surgery  [facility]

## 2022-02-18 NOTE — NURSING NOTE
"Initial /87 (BP Location: Right arm, Patient Position: Chair, Cuff Size: Adult Large)   Pulse 76   Temp 98.2  F (36.8  C) (Tympanic)   Wt 90.7 kg (200 lb)   LMP  (LMP Unknown)   BMI 36.00 kg/m   Estimated body mass index is 36 kg/m  as calculated from the following:    Height as of 12/3/21: 1.588 m (5' 2.5\").    Weight as of this encounter: 90.7 kg (200 lb). .    Asuncion Bello LPN    "

## 2022-04-15 ENCOUNTER — OFFICE VISIT (OUTPATIENT)
Dept: PODIATRY | Facility: CLINIC | Age: 57
End: 2022-04-15
Payer: COMMERCIAL

## 2022-04-15 VITALS
WEIGHT: 200 LBS | HEIGHT: 63 IN | DIASTOLIC BLOOD PRESSURE: 67 MMHG | BODY MASS INDEX: 35.44 KG/M2 | SYSTOLIC BLOOD PRESSURE: 131 MMHG | HEART RATE: 80 BPM

## 2022-04-15 DIAGNOSIS — L84 CALLUS OF FOOT: Primary | ICD-10-CM

## 2022-04-15 PROCEDURE — 99213 OFFICE O/P EST LOW 20 MIN: CPT | Performed by: PODIATRIST

## 2022-04-15 NOTE — NURSING NOTE
"Chief Complaint   Patient presents with     RECHECK     BL callus trim       Initial /67   Pulse 80   Ht 1.588 m (5' 2.5\")   Wt 90.7 kg (200 lb)   LMP  (LMP Unknown)   BMI 36.00 kg/m   Estimated body mass index is 36 kg/m  as calculated from the following:    Height as of this encounter: 1.588 m (5' 2.5\").    Weight as of this encounter: 90.7 kg (200 lb).  Medications and allergies reviewed.      Cata EMANUEL MA    "

## 2022-04-15 NOTE — LETTER
"    4/15/2022         RE: Paulette Flores  [address]  [place] [address]        Dear Colleague,    Thank you for referring your patient, Paulette Flores, to the [facility] [place]. Please see a copy of my visit note below.    Paulette returns to the office for reevaluation of the right and left foot.  The patient relates redeveloping calluses to the bottoms of the balls of both right and left foot.   The patient relates pain with ambulation.  The patient relates no other problems.    Vitals: /67   Pulse 80   Ht 1.588 m (5' 2.5\")   Wt 90.7 kg (200 lb)   LMP  (LMP Unknown)   BMI 36.00 kg/m    BMI= Body mass index is 36 kg/m .    Lower Extremity Physical Exam:      Neurovascular status remains unchanged.  Muscular exam is within normal limits to major muscle groups.  Integument is intact.      Noted hyperkeratotic skin lesions on the plantar aspect of the metatarsophalangeal joint bilaterally.  No surrounding erythema edema or subdermal hemorrhage noted.         Assessment:     ICD-10-CM    1. Callus of foot  L84        Plan:    I have explained to Paulette about the progress of the conditions.  At this time, the hyperkeratotic skin lesions were sharply debrided with a #10 blade down to healthy epidermis.  No bleeding noted.  The patient was given information on obtaining a full-length silicone shoe insert to add cushion to the ball of the foot and prevent further callus formation.    Paulette verbalized agreement with and understanding of the rational for the diagnosis and treatment plan.  All questions were answered to best of my ability and the patient's satisfaction. The patient was advised to contact the clinic with any questions that may arise after the clinic visit.      Disclaimer: This note consists of symbols derived from keyboarding, dictation and/or voice recognition software. As a result, there may be errors in the script that have gone undetected. Please consider this when " interpreting information found in this chart.       NATALIE Crook D.P.M., FROSE MARY.F.A.S.        Again, thank you for allowing me to participate in the care of your patient.        Sincerely,        Rafi Crook DPM

## 2022-04-15 NOTE — PATIENT INSTRUCTIONS
Calluses of the Foot    I.  Calluses on the toes   A.  Tips of the toes    1.  Due to pressure on the tips of the toes when wearing shoes, sandals or barefoot.    2.  Related to hammertoe deformity of the toes.    3.  Treated with wearing soft cushioned toe caps or shoe liners to better offload the pressure to the tips of the toes    4.  Correcting the deformity of the toe is another option if cushions do not help   B.  Top of sides of the knuckles of the toes    1.  Due to pressure from adjacent toes or shoes on the knuckles of the toes.    2.  Can be related to hammertoe deformities    3.  Treated with wearing roomy shoes with soft top-cover material in order not to rub on the skin    4.  Silicone toe caps can also be used to protect rubbing from the knuckles of the adjacent toes.    5.  Correcting the deformity of the toe is another option if offloading measures do not work.  II. Calluses on the bottom of the foot   A.   Pressure points    1.  Caused by direct pressure overlying prominent bones such as metatarsal head on the balls of the foot.    2.  Can be related to either hammertoe deformities or fat pad atrophy    3.  Can be treated with additional cushion utilizing silicone shoe liners to better offload the pressure and supplement for fat pad atrophy.    4.  Surgical correction of hammertoe deformities is another option if offloading cushion treatment does not work.   B.   Shear forces    1.  Caused by sliding forces along the skin on the bottom of the forefoot including the great toe.    2.  This is not due to a rotational force as the foot pushes off against the ground.    3.  Treated with wearing a silicone shoe liner that can move with the skin reducing the amount of shear force causing the callus formation.   C.   IPK lesions or porokeratosis    1.  These are small hard callus lesions that are related to plugged sweat ducts.    2.  These ducts travel through the epidermis and dermis residing in the  subcutaneous tissue    3.  When the ducts get clogged, they can harden up resulting in callused skin around them.      4.  Treatment includes sharp excavation of the hyperkeratotic callus tissue core as far as can be tolerated, preferably without bleeding.    5.   Other treatment options   A.  Application of salicylic acid to soften the hyperkeratotic skin   B.  Cantharone which causes a blister in attempt to pull off the hyperkeratotic skin lesion.    Helpful products:    Soles  Silicone Shoe Inserts    Amazon: https://www.[employer]/Soles-Silicone-Orthopedic-Comfortable-Hypoallergenic/dp/X92HSOW4HR/ref=sr_1_2?dchild=1&keywords=silicone+sole&wtv=4863086860&sr=8-2    Dr. Cadena's Gels Toe Caps      Sold at [employer] in [facility]    Amazon: https://www.[employer]/Benito-All-Mini-Size/dp/A63ZJKRG36/ref=sr_1_2?crid=B5QTZVIRINOK&dchild=1&keywords=+myrons+all+gel+toe+cap&eoz=[id number]&sprefix=.+Myron%27s+gels+toe%2Caps%2C171&sr=8-2 SMOKING CESSATION  What's in cigarette smoke? - Cigarette smoke contains over 4,000 chemicals. Nicotine is one of the main ingredients which is an insecticide/herbicide. It is poisonous to our nervous system, increases blood clotting risk, and decreases the body's defenses to fight off infection. Another chemical is Carbon Monoxide is an asphyxiating gas that permanently binds to blood cells and blocks the transport of oxygen. This leads to tissue death and decreases your metabolism. Tar is a chemical that coats your lungs and trachea which impairs new oxygen coming in and carbon dioxide getting out of your body.   How does smoking impact surgery? - Smoking is particularly hazardous with regards to surgery. Surgery puts stress on the body and a smoker's body is already under strain from these chemicals. Putting the two together, especially for an elective surgery, could be a recipe for disaster. Smoking before and after surgery increases your risk of  heart problems, slow wound healing, delayed bone healing, blood clots, wound infection and anesthesia complications.   What are the benefits of quitting? - In 20 minutes your blood pressure will drop back down to normal. In 8 hours the carbon monoxide (a toxic gas) levels in your blood stream will drop by half, and oxygen levels will return to normal. In 48 hours your chance of having a heart attack will have decreased. All nicotine will have left your body. Your sense of taste and smell will return to a normal level. In 72 hours your bronchial tubes will relax, and your energy levels will increase. In 2 weeks your circulation will increase, and it will continue to improve for the next 10 weeks.    Recommendations for elective surgery - Ideally, patients should quit smoking 8 weeks before and at least 2 weeks after elective surgery in order to avoid complications. Simply cutting back on the amount of cigarettes smoked per day does not offer any benefit or decrease the risk of poor wound healing, heart problems, and infection. Smokers should also start taking Vitamin C and B for two weeks before surgery and two weeks after surgery.    Ways to Stop Smokin. Nicotine patches, lozenges, or gum  2. Support Groups  3. Medications (see below)    List of Medications:  1. Varenicline Tartrate (CHANTIX) (may be discontinued by FDA as of 2021)  2. Bupropion HCL (WELLBUTRIN, ZYBAN) - note: make sure Wellbutrin is for smoking cessation and not other issues   3. Nicotine Patch (NICODERM)   4. Nicotine Inhaler (NICOTROL)   5. Nicotine Gum Nicotine Polacrilex   6. Nicotine Lozenge: Nicotine Polacrilex (COMMIT)   * [facility] offers a smoking support group as well!  Please visit: [email or web address]  If you are interested in these, ask about getting a prescription or talk to your primary care doctor about what may be the best way for you to quit.

## 2022-04-15 NOTE — PROGRESS NOTES
"Paulette returns to the office for reevaluation of the right and left foot.  The patient relates redeveloping calluses to the bottoms of the balls of both right and left foot.   The patient relates pain with ambulation.  The patient relates no other problems.    Vitals: /67   Pulse 80   Ht 1.588 m (5' 2.5\")   Wt 90.7 kg (200 lb)   LMP  (LMP Unknown)   BMI 36.00 kg/m    BMI= Body mass index is 36 kg/m .    Lower Extremity Physical Exam:      Neurovascular status remains unchanged.  Muscular exam is within normal limits to major muscle groups.  Integument is intact.      Noted hyperkeratotic skin lesions on the plantar aspect of the metatarsophalangeal joint bilaterally.  No surrounding erythema edema or subdermal hemorrhage noted.         Assessment:     ICD-10-CM    1. Callus of foot  L84        Plan:    I have explained to Paulette about the progress of the conditions.  At this time, the hyperkeratotic skin lesions were sharply debrided with a #10 blade down to healthy epidermis.  No bleeding noted.  The patient was given information on obtaining a full-length silicone shoe insert to add cushion to the ball of the foot and prevent further callus formation.    Paulette verbalized agreement with and understanding of the rational for the diagnosis and treatment plan.  All questions were answered to best of my ability and the patient's satisfaction. The patient was advised to contact the clinic with any questions that may arise after the clinic visit.      Disclaimer: This note consists of symbols derived from keyboarding, dictation and/or voice recognition software. As a result, there may be errors in the script that have gone undetected. Please consider this when interpreting information found in this chart.       NATALIE Crook D.P.M., F.JAMILA.C.F.A.S.    "

## 2022-06-07 ENCOUNTER — TELEPHONE (OUTPATIENT)
Dept: OTOLARYNGOLOGY | Facility: CLINIC | Age: 57
End: 2022-06-07
Payer: COMMERCIAL

## 2022-06-07 ENCOUNTER — HOSPITAL ENCOUNTER (OUTPATIENT)
Dept: PHYSICAL THERAPY | Facility: CLINIC | Age: 57
Setting detail: THERAPIES SERIES
Discharge: HOME | End: 2022-06-07
Attending: OTOLARYNGOLOGY
Payer: COMMERCIAL

## 2022-06-07 ENCOUNTER — TELEPHONE (OUTPATIENT)
Dept: FAMILY MEDICINE | Facility: CLINIC | Age: 57
End: 2022-06-07

## 2022-06-07 DIAGNOSIS — H81.10 BENIGN POSITIONAL VERTIGO, UNSPECIFIED LATERALITY: Primary | ICD-10-CM

## 2022-06-07 DIAGNOSIS — R42 VERTIGO: Primary | ICD-10-CM

## 2022-06-07 PROCEDURE — 95992 CANALITH REPOSITIONING PROC: CPT | Mod: GP | Performed by: PHYSICAL THERAPIST

## 2022-06-07 PROCEDURE — 97161 PT EVAL LOW COMPLEX 20 MIN: CPT | Mod: GP | Performed by: PHYSICAL THERAPIST

## 2022-06-07 NOTE — TELEPHONE ENCOUNTER
[facility] Call Center    Phone Message    May a detailed message be left on voicemail: yes     Reason for Call: Other: Pt is seeing a provider in [place] this afternoon for vertigo and is requesting that Dr. Villanueva place a referral in her chart for this. Please call pt with any questions. Thank you.     Action Taken: Message routed to:  Other: [facility]    Travel Screening: Not Applicable

## 2022-06-07 NOTE — TELEPHONE ENCOUNTER
Routed to Laura Sarah to address patient request for referral for PT, I do see phone message regarding referral directed to ENT today as well.  Her vertigo assessment with PT is scheduled for 1:45 pm today.    Meri Fritz RN  [facility]

## 2022-06-07 NOTE — TELEPHONE ENCOUNTER
Reason for Call:  Other     Detailed comments: Patient sees Dr Kay Barbour an ENT and he is not available and she has physical therapy for her vertigo today and needs a referral placed was told by clinic to contact primary provider to place this referral.    Phone Number Patient can be reached at: Work number on file:  [phone] (work)    Best Time:     Can we leave a detailed message on this number? YES    Call taken on 6/7/2022 at 9:39 AM by Dania Scott

## 2022-06-07 NOTE — PROGRESS NOTES
Physical Therapy Evaluation  06/07/22 1400   Quick Adds   Quick Adds Vestibular Eval   Type of Visit Initial OP PT Evaluation   General Information   Start of Care Date 06/07/22   Referring Physician MUKUL Long   Orders Evaluate and Treat as Indicated   Order Date 06/07/22   Medical Diagnosis Vertigo   Onset of illness/injury or Date of Surgery 06/01/22   Precautions/Limitations no known precautions/limitations   Surgical/Medical history reviewed Yes   Pertinent history of current problem (include personal factors and/or comorbidities that impact the POC) Pt has a hx of BPPV and was treated last fall for it.  Reports this feels the exact same however has been trying the home epley with not improvement in symptoms.   Pertinent Visual History  computer glasses and reading glasses   Prior level of function comment independent   Previous/Current Treatment Physical Therapy   Improvement after PT Significant   Patient role/Employment history Employed   Patient/Family Goals Statement for the dizziness to go away   General Information Comments PMHx: ear surgeries   Fall Risk Screen   Fall screen completed by PT   Have you fallen 2 or more times in the past year? No   Have you fallen and had an injury in the past year? No   Is patient a fall risk? No   Abuse Screen (yes response referral indicated)   Feels Unsafe at Home or Work/School no   Feels Threatened by Someone no   Does Anyone Try to Keep You From Having Contact with Others or Doing Things Outside Your Home? no   Physical Signs of Abuse Present no   Patient needs abuse support services and resources No   System Outcome Measures   Outcome Measures BPPV   Dizziness Handicap Inventory (score out of 100) A decrease in score by 17.18 or greater indicates a clinically significant change in symptoms. 20   Pain   Patient currently in pain No   Cognitive Status Examination   Orientation orientation to person, place and time   Level of Consciousness alert   Follows  Commands and Answers Questions 100% of the time   Personal Safety and Judgment intact   Memory intact   Posture   Posture Normal   Range of Motion (ROM)   ROM Comment full cervical ROM   Strength   Strength Comments no concerns   Cervicogenic Screen   Neck ROM full ROM, increased symptoms with neck extension   Vertebral Artery Test Normal   Alar Ligament Test Normal   Transverse Ligament Test Normal   Oculomotor Exam   Smooth Pursuit Normal   Infrared Goggle Exam or Frenzel Lense Exam   Vestibular Suppressant in Last 24 Hours? Yes   Exam completed with Room Light   Spontaneous Nystagmus Negative   Malathi-Hallpike (right) Negative   Malathi-Hallpike (Left) Upbeating L torsional   HSCC Supine Roll Test (Right) Negative   HSCC Supine Roll Test (Left) Comments  not assesed   BPPV Canal(s) L Posterior   BPPV Type Canalithasis   Planned Therapy Interventions   Planned Therapy Interventions balance training;gait training;neuromuscular re-education;ROM;strengthening;stretching;manual therapy  (SCR)   Clinical Impression   Criteria for Skilled Therapeutic Interventions Met yes, treatment indicated   PT Diagnosis L posterior canalithasis BPPV   Influenced by the following impairments (+) malathi hallpike, dizziness   Functional limitations due to impairments difficulty with bed mobility, dizziness with looking up   Clinical Presentation Stable/Uncomplicated   Clinical Decision Making (Complexity) Low complexity   Therapy Frequency 2 times/Week   Predicted Duration of Therapy Intervention (days/wks) 10 weeks   Risk & Benefits of therapy have been explained Yes   Patient, Family & other staff in agreement with plan of care Yes   GOALS   PT Eval Goals 1;2;3   Goal 1   Goal Identifier malathi hallpike   Goal Description Pt will demonstrate a negative malathi hallpike in order to have decreased dizziness.   Target Date 07/19/22   Goal 2   Goal Identifier bed mobility   Goal Description Pt will report 75% reduction in symptoms with bed moblity.    Target Date 08/16/22   Goal 3   Goal Identifier driving   Goal Description Pt will reports tolerating lookin over her shoulder while driving to have increased safety with driving.   Target Date 08/16/22   Total Evaluation Time   PT Mary, Low Complexity Minutes (10167) 15     Avril Nettles, PT, DPT, CLT  Physical Therapist & Certified Lymphedema Therapist  Count includes the [facility]

## 2022-06-07 NOTE — TELEPHONE ENCOUNTER
Called and inquired.  Diag TMJ  And pt appt is for vertigo.  Pt states the TMJ is resolved at this time but suffered onset of severe vertigo 3 days ago that will not subside.  Falling down several times a day.  Has tried all her home therapies to resolve like previous PT instructed her however it has been ineffective.  Requests order for PT for BPPV.    Advised provider not in clinic and unable to say if refer will be done before today's appt.  Suggested she try her PCP to renew the orders.  Msg being sent in case provider works remote today.    Mckenna WAYNE   Specialty Clinic RN

## 2022-06-09 ENCOUNTER — HOSPITAL ENCOUNTER (OUTPATIENT)
Dept: PHYSICAL THERAPY | Facility: CLINIC | Age: 57
Setting detail: THERAPIES SERIES
Discharge: HOME | End: 2022-06-09
Attending: PHYSICIAN ASSISTANT
Payer: COMMERCIAL

## 2022-06-09 PROCEDURE — 95992 CANALITH REPOSITIONING PROC: CPT | Mod: GP | Performed by: PHYSICAL THERAPIST

## 2022-06-14 ENCOUNTER — HOSPITAL ENCOUNTER (OUTPATIENT)
Dept: PHYSICAL THERAPY | Facility: CLINIC | Age: 57
Setting detail: THERAPIES SERIES
Discharge: HOME | End: 2022-06-14
Attending: PHYSICIAN ASSISTANT
Payer: COMMERCIAL

## 2022-06-14 PROCEDURE — 95992 CANALITH REPOSITIONING PROC: CPT | Mod: GP | Performed by: PHYSICAL THERAPIST

## 2022-06-17 ENCOUNTER — HOSPITAL ENCOUNTER (OUTPATIENT)
Dept: PHYSICAL THERAPY | Facility: CLINIC | Age: 57
Setting detail: THERAPIES SERIES
Discharge: HOME | End: 2022-06-17
Attending: PHYSICIAN ASSISTANT
Payer: COMMERCIAL

## 2022-06-17 PROCEDURE — 95992 CANALITH REPOSITIONING PROC: CPT | Mod: GP | Performed by: PHYSICAL THERAPIST

## 2022-07-01 ENCOUNTER — OFFICE VISIT (OUTPATIENT)
Dept: FAMILY MEDICINE | Facility: CLINIC | Age: 57
End: 2022-07-01
Payer: COMMERCIAL

## 2022-07-01 VITALS
SYSTOLIC BLOOD PRESSURE: 123 MMHG | DIASTOLIC BLOOD PRESSURE: 78 MMHG | RESPIRATION RATE: 20 BRPM | HEIGHT: 63 IN | OXYGEN SATURATION: 99 % | BODY MASS INDEX: 37.56 KG/M2 | TEMPERATURE: 98.4 F | HEART RATE: 73 BPM | WEIGHT: 212 LBS

## 2022-07-01 DIAGNOSIS — Z83.49 FAMILY HISTORY OF HYPOTHYROIDISM: ICD-10-CM

## 2022-07-01 DIAGNOSIS — M54.50 CHRONIC LEFT-SIDED LOW BACK PAIN WITHOUT SCIATICA: ICD-10-CM

## 2022-07-01 DIAGNOSIS — Z12.4 CERVICAL CANCER SCREENING: ICD-10-CM

## 2022-07-01 DIAGNOSIS — Z00.00 ROUTINE GENERAL MEDICAL EXAMINATION AT A HEALTH CARE FACILITY: Primary | ICD-10-CM

## 2022-07-01 DIAGNOSIS — G89.29 CHRONIC MIDLINE THORACIC BACK PAIN: ICD-10-CM

## 2022-07-01 DIAGNOSIS — G89.29 CHRONIC LEFT-SIDED LOW BACK PAIN WITHOUT SCIATICA: ICD-10-CM

## 2022-07-01 DIAGNOSIS — M54.6 CHRONIC MIDLINE THORACIC BACK PAIN: ICD-10-CM

## 2022-07-01 LAB
CHOLEST SERPL-MCNC: 203 MG/DL
FASTING STATUS PATIENT QL REPORTED: YES
FASTING STATUS PATIENT QL REPORTED: YES
GLUCOSE BLD-MCNC: 92 MG/DL (ref 70–99)
HDLC SERPL-MCNC: 66 MG/DL
LDLC SERPL CALC-MCNC: 123 MG/DL
NONHDLC SERPL-MCNC: 137 MG/DL
TRIGL SERPL-MCNC: 72 MG/DL
TSH SERPL DL<=0.005 MIU/L-ACNC: 1.6 MU/L (ref 0.4–4)

## 2022-07-01 PROCEDURE — 99214 OFFICE O/P EST MOD 30 MIN: CPT | Mod: 25 | Performed by: PHYSICIAN ASSISTANT

## 2022-07-01 PROCEDURE — 80061 LIPID PANEL: CPT | Performed by: PHYSICIAN ASSISTANT

## 2022-07-01 PROCEDURE — G0145 SCR C/V CYTO,THINLAYER,RESCR: HCPCS | Performed by: PHYSICIAN ASSISTANT

## 2022-07-01 PROCEDURE — 99396 PREV VISIT EST AGE 40-64: CPT | Performed by: PHYSICIAN ASSISTANT

## 2022-07-01 PROCEDURE — 36415 COLL VENOUS BLD VENIPUNCTURE: CPT | Performed by: PHYSICIAN ASSISTANT

## 2022-07-01 PROCEDURE — 87624 HPV HI-RISK TYP POOLED RSLT: CPT | Performed by: PHYSICIAN ASSISTANT

## 2022-07-01 PROCEDURE — 82947 ASSAY GLUCOSE BLOOD QUANT: CPT | Performed by: PHYSICIAN ASSISTANT

## 2022-07-01 PROCEDURE — 84443 ASSAY THYROID STIM HORMONE: CPT | Performed by: PHYSICIAN ASSISTANT

## 2022-07-01 ASSESSMENT — ENCOUNTER SYMPTOMS
ABDOMINAL PAIN: 0
DYSURIA: 0
ARTHRALGIAS: 1
NAUSEA: 0
NERVOUS/ANXIOUS: 0
EYE PAIN: 0
HEADACHES: 0
HEMATOCHEZIA: 0
CHILLS: 0
DIARRHEA: 0
CONSTIPATION: 0
SORE THROAT: 0
BREAST MASS: 0
SHORTNESS OF BREATH: 0
DIZZINESS: 1
HEMATURIA: 0
PARESTHESIAS: 0
FREQUENCY: 0
HEARTBURN: 0
JOINT SWELLING: 0
PALPITATIONS: 0
WEAKNESS: 0
MYALGIAS: 1
FEVER: 0
COUGH: 0

## 2022-07-01 ASSESSMENT — PAIN SCALES - GENERAL: PAINLEVEL: NO PAIN (0)

## 2022-07-01 NOTE — PROGRESS NOTES
SUBJECTIVE:   CC: Paulette Flores is an 57 year old woman who presents for preventive health visit.       Patient has been advised of split billing requirements and indicates understanding: Yes  Healthy Habits:     Getting at least 3 servings of Calcium per day:  Yes    Bi-annual eye exam:  Yes    Dental care twice a year:  Yes    Sleep apnea or symptoms of sleep apnea:  Daytime drowsiness    Diet:  Carbohydrate counting    Frequency of exercise:  2-3 days/week    Duration of exercise:  15-30 minutes    Taking medications regularly:  Not Applicable    PHQ-2 Total Score: 0    Additional concerns today:  Yes      The 10-year ASCVD risk score (Juliocesar GILL JrSebastian, et al., 2013) is: 5.4%    Values used to calculate the score:      Age: 57 years      Sex: Female      Is Non- : No      Diabetic: No      Tobacco smoker: Yes      Systolic Blood Pressure: 123 mmHg      Is BP treated: No      HDL Cholesterol: 64 mg/dL      Total Cholesterol: 202 mg/dL       PROBLEMS TO ADD ON...  Fell inth/8th grade  Fell again 1.5 years ago  subcutaneous mass of left glute - noticed after feel into retaining wall  Pain in thoracic spine when sitting - feels like a pressure - noticed this in the last 6 months  Had XR's done in 2018      Today's PHQ-2 Score:   PHQ-2 ( 1999 [employer]) 7/1/2022   Q1: Little interest or pleasure in doing things 0   Q2: Feeling down, depressed or hopeless 0   PHQ-2 Score 0   PHQ-2 Total Score (12-17 Years)- Positive if 3 or more points; Administer PHQ-A if positive -   Q1: Little interest or pleasure in doing things Not at all   Q2: Feeling down, depressed or hopeless Not at all   PHQ-2 Score 0       Abuse: Current or Past (Physical, Sexual or Emotional) - No  Do you feel safe in your environment? Yes        Social History     Tobacco Use     Smoking status: Light Tobacco Smoker     Packs/day: 0.20     Years: 21.00     Pack years: 4.20     Types: Cigarettes     Smokeless tobacco: Never Used      Tobacco comment: trying to quit/cut down   Substance Use Topics     Alcohol use: No     Alcohol/week: 0.0 standard drinks         Alcohol Use 7/1/2022   Prescreen: >3 drinks/day or >7 drinks/week? No   Prescreen: >3 drinks/day or >7 drinks/week? -       Reviewed orders with patient.  Reviewed health maintenance and updated orders accordingly - Yes  Lab work is in process  Labs reviewed in EPIC    Breast Cancer Screening:    Breast CA Risk Assessment (FHS-7) 7/1/2022   Do you have a family history of breast, colon, or ovarian cancer? No / Unknown       Mammogram Screening: Recommended mammography every 1-2 years with patient discussion and risk factor consideration  Pertinent mammograms are reviewed under the imaging tab.    History of abnormal Pap smear: NO - age 30-65 PAP every 5 years with negative HPV co-testing recommended  PAP / HPV Latest Ref Rng & Units 8/4/2017   PAP (Historical) - NIL   HPV16 NEG Negative   HPV18 NEG Negative   HRHPV NEG Negative     Reviewed and updated as needed this visit by clinical staff   Tobacco  Allergies  Meds   Med Hx  Surg Hx  Fam Hx  Soc Hx          Reviewed and updated as needed this visit by Provider                   History reviewed. No pertinent past medical history.     Review of Systems   Constitutional: Negative for chills and fever.   HENT: Negative for congestion, ear pain, hearing loss and sore throat.    Eyes: Negative for pain and visual disturbance.   Respiratory: Negative for cough and shortness of breath.    Cardiovascular: Negative for chest pain, palpitations and peripheral edema.   Gastrointestinal: Negative for abdominal pain, constipation, diarrhea, heartburn, hematochezia and nausea.   Breasts:  Negative for tenderness, breast mass and discharge.   Genitourinary: Negative for dysuria, frequency, genital sores, hematuria, pelvic pain, urgency, vaginal bleeding and vaginal discharge.   Musculoskeletal: Positive for arthralgias and myalgias. Negative for  "joint swelling.   Skin: Negative for rash.   Neurological: Positive for dizziness. Negative for weakness, headaches and paresthesias.   Psychiatric/Behavioral: Negative for mood changes. The patient is not nervous/anxious.         OBJECTIVE:   /78   Pulse 73   Temp 98.4  F (36.9  C) (Oral)   Resp 20   Ht 1.607 m (5' 3.25\")   Wt 96.2 kg (212 lb)   LMP  (LMP Unknown)   SpO2 99%   Breastfeeding No   BMI 37.26 kg/m    Physical Exam  GENERAL: healthy, alert and no distress  EYES: Eyes grossly normal to inspection, PERRL and conjunctivae and sclerae normal  HENT: ear canals and TM's normal, nose and mouth without ulcers or lesions  NECK: no adenopathy, no asymmetry, masses, or scars and thyroid normal to palpation  RESP: lungs clear to auscultation - no rales, rhonchi or wheezes  BREAST: normal without masses, tenderness or nipple discharge and no palpable axillary masses or adenopathy  CV: regular rates and rhythm, normal S1 S2, no S3 or S4 and no murmur, click or rub  ABDOMEN: soft, nontender, no hepatosplenomegaly, no masses and bowel sounds normal   (female): normal female external genitalia, normal urethral meatus, vaginal mucosa pink, moist, well rugated, and normal cervix/adnexa/uterus without masses or discharge  MS: subcutaneous mass of the left buttock, mild TTP  SKIN: no suspicious lesions or rashes  NEURO: Normal strength and tone, mentation intact and speech normal  PSYCH: mentation appears normal, affect normal/bright      ASSESSMENT/PLAN:       ICD-10-CM    1. Routine general medical examination at a health care facility  Z00.00 PAP screen with HPV - recommended age 30 - 65 years     Glucose     Lipid panel reflex to direct LDL Fasting   2. Cervical cancer screening  Z12.4    3. Family history of hypothyroidism  Z83.49 TSH with free T4 reflex   4. Chronic midline thoracic back pain  M54.6 MR Thoracic Spine w/o Contrast    G89.29    5. Chronic left-sided low back pain without sciatica  " "M54.50 MR Lumbar Spine w/o Contrast    G89.29          1,2) Screenings discussed    3) TSH in process    4,5) Will obtain a thoracic and lumbar MRI. X-rays from 2018 reviewed today. Follow up pending results.       COUNSELING:  Reviewed preventive health counseling, as reflected in patient instructions    Estimated body mass index is 37.26 kg/m  as calculated from the following:    Height as of this encounter: 1.607 m (5' 3.25\").    Weight as of this encounter: 96.2 kg (212 lb).    She reports that she has been smoking cigarettes. She has a 4.20 pack-year smoking history. She has never used smokeless tobacco.  Tobacco Cessation Action Plan      Counseling Resources:  ATP IV Guidelines  Pooled Cohorts Equation Calculator  Breast Cancer Risk Calculator  BRCA-Related Cancer Risk Assessment: FHS-7 Tool  FRAX Risk Assessment  ICSI Preventive Guidelines  Dietary Guidelines for Americans, 2010  USDA's MyPlate  ASA Prophylaxis  Lung CA Screening    Laura Sarah PA-C  [facility]  "

## 2022-07-01 NOTE — PATIENT INSTRUCTIONS
Ask your insurance about the Shingles vaccine:  1) Is it covered?  2) Where should I get it?    Fats: 20-35% of your diet  Carbs and protein make up the rest of the percentage  [email or web address]  Macro calculator    My Macros and [employer]      Preventive Health Recommendations  Female Ages 50 - 64    Yearly exam: See your health care provider every year in order to  Review health changes.   Discuss preventive care.    Review your medicines if your doctor has prescribed any.    Get a Pap test every three years (unless you have an abnormal result and your provider advises testing more often).  If you get Pap tests with HPV test, you only need to test every 5 years, unless you have an abnormal result.   You do not need a Pap test if your uterus was removed (hysterectomy) and you have not had cancer.  You should be tested each year for STDs (sexually transmitted diseases) if you're at risk.   Have a mammogram every 1 to 2 years.  Have a colonoscopy at age 50, or have a yearly FIT test (stool test). These exams screen for colon cancer.    Have a cholesterol test every 5 years, or more often if advised.  Have a diabetes test (fasting glucose) every three years. If you are at risk for diabetes, you should have this test more often.   If you are at risk for osteoporosis (brittle bone disease), think about having a bone density scan (DEXA).    Shots: Get a flu shot each year. Get a tetanus shot every 10 years.    Nutrition:   Eat at least 5 servings of fruits and vegetables each day.  Eat whole-grain bread, whole-wheat pasta and brown rice instead of white grains and rice.  Get adequate Calcium and Vitamin D.     Lifestyle  Exercise at least 150 minutes a week (30 minutes a day, 5 days a week). This will help you control your weight and prevent disease.  Limit alcohol to one drink per day.  No smoking.   Wear sunscreen to prevent skin cancer.   See your dentist every six months for an exam and cleaning.  See your eye  doctor every 1 to 2 years.

## 2022-07-08 ENCOUNTER — ANCILLARY PROCEDURE (OUTPATIENT)
Dept: MAMMOGRAPHY | Facility: CLINIC | Age: 57
End: 2022-07-08
Payer: COMMERCIAL

## 2022-07-08 DIAGNOSIS — Z12.31 VISIT FOR SCREENING MAMMOGRAM: ICD-10-CM

## 2022-07-08 LAB
BKR LAB AP GYN ADEQUACY: NORMAL
BKR LAB AP GYN INTERPRETATION: NORMAL
BKR LAB AP HPV REFLEX: NORMAL
BKR LAB AP PREVIOUS ABNORMAL: NORMAL
PATH REPORT.COMMENTS IMP SPEC: NORMAL
PATH REPORT.COMMENTS IMP SPEC: NORMAL
PATH REPORT.RELEVANT HX SPEC: NORMAL

## 2022-07-08 PROCEDURE — 77067 SCR MAMMO BI INCL CAD: CPT | Mod: TC | Performed by: RADIOLOGY

## 2022-07-08 PROCEDURE — 77063 BREAST TOMOSYNTHESIS BI: CPT | Mod: TC | Performed by: RADIOLOGY

## 2022-07-11 LAB
HUMAN PAPILLOMA VIRUS 16 DNA: NEGATIVE
HUMAN PAPILLOMA VIRUS 18 DNA: NEGATIVE
HUMAN PAPILLOMA VIRUS FINAL DIAGNOSIS: NORMAL
HUMAN PAPILLOMA VIRUS OTHER HR: NEGATIVE

## 2022-07-14 ENCOUNTER — ANCILLARY PROCEDURE (OUTPATIENT)
Dept: MRI IMAGING | Facility: CLINIC | Age: 57
End: 2022-07-14
Attending: PHYSICIAN ASSISTANT
Payer: COMMERCIAL

## 2022-07-14 DIAGNOSIS — G89.29 CHRONIC MIDLINE THORACIC BACK PAIN: ICD-10-CM

## 2022-07-14 DIAGNOSIS — M54.50 CHRONIC LEFT-SIDED LOW BACK PAIN WITHOUT SCIATICA: ICD-10-CM

## 2022-07-14 DIAGNOSIS — G89.29 CHRONIC LEFT-SIDED LOW BACK PAIN WITHOUT SCIATICA: ICD-10-CM

## 2022-07-14 DIAGNOSIS — M54.6 CHRONIC MIDLINE THORACIC BACK PAIN: ICD-10-CM

## 2022-07-14 PROCEDURE — 72148 MRI LUMBAR SPINE W/O DYE: CPT | Mod: TC | Performed by: RADIOLOGY

## 2022-07-14 PROCEDURE — 72146 MRI CHEST SPINE W/O DYE: CPT | Mod: TC | Performed by: RADIOLOGY

## 2022-07-15 ENCOUNTER — MYC MEDICAL ADVICE (OUTPATIENT)
Dept: FAMILY MEDICINE | Facility: CLINIC | Age: 57
End: 2022-07-15

## 2022-07-15 DIAGNOSIS — M54.50 CHRONIC LEFT-SIDED LOW BACK PAIN WITHOUT SCIATICA: ICD-10-CM

## 2022-07-15 DIAGNOSIS — G89.29 CHRONIC MIDLINE THORACIC BACK PAIN: Primary | ICD-10-CM

## 2022-07-15 DIAGNOSIS — M54.6 CHRONIC MIDLINE THORACIC BACK PAIN: Primary | ICD-10-CM

## 2022-07-15 DIAGNOSIS — G89.29 CHRONIC LEFT-SIDED LOW BACK PAIN WITHOUT SCIATICA: ICD-10-CM

## 2022-07-15 DIAGNOSIS — R93.89 ABNORMAL MRI: ICD-10-CM

## 2022-07-18 ENCOUNTER — ANCILLARY PROCEDURE (OUTPATIENT)
Dept: ULTRASOUND IMAGING | Facility: CLINIC | Age: 57
End: 2022-07-18
Attending: PHYSICIAN ASSISTANT
Payer: COMMERCIAL

## 2022-07-18 DIAGNOSIS — R92.8 ABNORMAL MAMMOGRAM: ICD-10-CM

## 2022-07-18 PROCEDURE — 76642 ULTRASOUND BREAST LIMITED: CPT | Mod: RT | Performed by: RADIOLOGY

## 2022-07-19 NOTE — TELEPHONE ENCOUNTER
Patient requesting referral for spine specialist to further discuss any treatments or procedures that could be helpful with her pain.

## 2022-08-03 ENCOUNTER — OFFICE VISIT (OUTPATIENT)
Dept: PODIATRY | Facility: CLINIC | Age: 57
End: 2022-08-03
Payer: COMMERCIAL

## 2022-08-03 VITALS
WEIGHT: 212 LBS | HEART RATE: 82 BPM | SYSTOLIC BLOOD PRESSURE: 147 MMHG | BODY MASS INDEX: 37.56 KG/M2 | HEIGHT: 63 IN | DIASTOLIC BLOOD PRESSURE: 77 MMHG

## 2022-08-03 DIAGNOSIS — L84 CALLUS OF FOOT: Primary | ICD-10-CM

## 2022-08-03 PROCEDURE — 99213 OFFICE O/P EST LOW 20 MIN: CPT | Performed by: PODIATRIST

## 2022-08-03 NOTE — PROGRESS NOTES
"Paulette returns to the office for reevaluation of the left foot.  The patient relates following the instructions given at the last visit with noted continued callus formation with pain on ambulation..   The patient relates multiple cushioned inserts as well as shoes with no relief.  Patient is inquiring about possible fat graft replacement to the ball of the foot.    Vitals: BP (!) 147/77   Pulse 82   Ht 1.607 m (5' 3.25\")   Wt 96.2 kg (212 lb)   LMP  (LMP Unknown)   BMI 37.26 kg/m    BMI= Body mass index is 37.26 kg/m .    Lower Extremity Physical Exam:      Neurovascular status remains unchanged.  Muscular exam is within normal limits to major muscle groups.  Integument is intact.      Noted pain on palpation under the hyperkeratotic skin lesion on the plantar aspect of the ball of the right and left foot.  No surrounding erythema edema or subdermal hemorrhage noted.         Assessment:     ICD-10-CM    1. Callus of foot  L84        Plan:    I have explained to Paulette about the progress of the conditions.  At this time, the hyperkeratotic tissue was sharply debrided with a 10 blade down to healthy epidermis.  Patient was advised to combine both a well cushioned silicone shoe insert along with topical treatment of salicylic acid such as Compound W to help soften the hyperkeratotic tissue and prevent further buildup.  I informed the patient that I do not perform the fat tissue transfer.  The patient may return to the office if problems persist.    Paulette verbalized agreement with and understanding of the rational for the diagnosis and treatment plan.  All questions were answered to best of my ability and the patient's satisfaction. The patient was advised to contact the clinic with any questions that may arise after the clinic visit.      Disclaimer: This note consists of symbols derived from keyboarding, dictation and/or voice recognition software. As a result, there may be errors in the script that have gone " undetected. Please consider this when interpreting information found in this chart.       NTAALIE Crook D.P.M., FAY.F.JEFFS.

## 2022-08-03 NOTE — LETTER
"    8/3/2022         RE: Paulette Flores  [address]  [place] [address]        Dear Colleague,    Thank you for referring your patient, Paulette Flores, to the [facility]. Please see a copy of my visit note below.    Paulette returns to the office for reevaluation of the left foot.  The patient relates following the instructions given at the last visit with noted continued callus formation with pain on ambulation..   The patient relates multiple cushioned inserts as well as shoes with no relief.  Patient is inquiring about possible fat graft replacement to the ball of the foot.    Vitals: BP (!) 147/77   Pulse 82   Ht 1.607 m (5' 3.25\")   Wt 96.2 kg (212 lb)   LMP  (LMP Unknown)   BMI 37.26 kg/m    BMI= Body mass index is 37.26 kg/m .    Lower Extremity Physical Exam:      Neurovascular status remains unchanged.  Muscular exam is within normal limits to major muscle groups.  Integument is intact.      Noted pain on palpation under the hyperkeratotic skin lesion on the plantar aspect of the ball of the right and left foot.  No surrounding erythema edema or subdermal hemorrhage noted.         Assessment:     ICD-10-CM    1. Callus of foot  L84        Plan:    I have explained to Paulette about the progress of the conditions.  At this time, the hyperkeratotic tissue was sharply debrided with a 10 blade down to healthy epidermis.  Patient was advised to combine both a well cushioned silicone shoe insert along with topical treatment of salicylic acid such as Compound W to help soften the hyperkeratotic tissue and prevent further buildup.  I informed the patient that I do not perform the fat tissue transfer.  The patient may return to the office if problems persist.    Paulette verbalized agreement with and understanding of the rational for the diagnosis and treatment plan.  All questions were answered to best of my ability and the patient's satisfaction. The patient was advised to " contact the clinic with any questions that may arise after the clinic visit.      Disclaimer: This note consists of symbols derived from keyboarding, dictation and/or voice recognition software. As a result, there may be errors in the script that have gone undetected. Please consider this when interpreting information found in this chart.       NATALIE Crook D.P.M., FROSE MARY.F.A.S.        Again, thank you for allowing me to participate in the care of your patient.        Sincerely,        Rafi Crook DPM

## 2022-08-03 NOTE — NURSING NOTE
"Chief Complaint   Patient presents with     RECHECK     Bl feet       Initial BP (!) 147/77   Pulse 82   Ht 1.607 m (5' 3.25\")   Wt 96.2 kg (212 lb)   LMP  (LMP Unknown)   BMI 37.26 kg/m   Estimated body mass index is 37.26 kg/m  as calculated from the following:    Height as of this encounter: 1.607 m (5' 3.25\").    Weight as of this encounter: 96.2 kg (212 lb).  Medications and allergies reviewed.      Cata EMANUEL MA    "

## 2022-08-10 ENCOUNTER — OFFICE VISIT (OUTPATIENT)
Dept: ORTHOPEDICS | Facility: CLINIC | Age: 57
End: 2022-08-10
Attending: PHYSICIAN ASSISTANT
Payer: COMMERCIAL

## 2022-08-10 VITALS
BODY MASS INDEX: 37.26 KG/M2 | DIASTOLIC BLOOD PRESSURE: 69 MMHG | WEIGHT: 212 LBS | SYSTOLIC BLOOD PRESSURE: 112 MMHG | HEART RATE: 75 BPM

## 2022-08-10 DIAGNOSIS — R93.89 ABNORMAL MRI: ICD-10-CM

## 2022-08-10 DIAGNOSIS — M25.552 LEFT HIP PAIN: Primary | ICD-10-CM

## 2022-08-10 DIAGNOSIS — M54.6 CHRONIC MIDLINE THORACIC BACK PAIN: ICD-10-CM

## 2022-08-10 DIAGNOSIS — G89.29 CHRONIC MIDLINE THORACIC BACK PAIN: ICD-10-CM

## 2022-08-10 DIAGNOSIS — M54.50 CHRONIC LEFT-SIDED LOW BACK PAIN WITHOUT SCIATICA: ICD-10-CM

## 2022-08-10 DIAGNOSIS — M25.852 LEFT HIP IMPINGEMENT SYNDROME: ICD-10-CM

## 2022-08-10 DIAGNOSIS — G89.29 CHRONIC LEFT-SIDED LOW BACK PAIN WITHOUT SCIATICA: ICD-10-CM

## 2022-08-10 PROCEDURE — 99204 OFFICE O/P NEW MOD 45 MIN: CPT | Performed by: PEDIATRICS

## 2022-08-10 NOTE — LETTER
8/10/2022         RE: Paulette Flores  [address]  [place] [address]        Dear Colleague,    Thank you for referring your patient, Paulette Flores, to the [facility] [place]. Please see a copy of my visit note below.    ASSESSMENT & PLAN    Paulette was seen today for pain, cervical/thoracic, lumbar/si and pain.    Diagnoses and all orders for this visit:    Left hip pain  -     Physical Therapy Referral; Future    Chronic midline thoracic back pain  -     Spine Referral  -     Spine  Referral; Future  -     Physical Therapy Referral; Future    Chronic left-sided low back pain without sciatica  -     Spine Referral  -     Spine  Referral; Future  -     Physical Therapy Referral; Future    Abnormal MRI  -     Spine Referral  -     Spine  Referral; Future  -     Physical Therapy Referral; Future    Left hip impingement syndrome  -     Physical Therapy Referral; Future      This issue is chronic and Unchanged.    We discussed these other possible diagnosis:  - Chronic low back pain with left hip pain. Thoracic disc on MRI possibly contributing, left sided SI joint pain and hip impingement also possibly contributing. Symptoms not consistent with lumbar radicular pain.  - Would recommend Spine Referral given L3 Cyst    Plan:  - Today's Plan of Care:  Referral to a Spine Surgeon given L3 Bone Cyst  Rehab: Physical Therapy: [facility] - [phone]  Discussed activity considerations and other supportive care including Ice/Heat, OTC and other topical medications as needed.    -We also discussed other future treatment options:  MRI or other imaging left hip    Follow Up: 6 - 8 weeks    Concerning signs and symptoms were reviewed.  The patient expressed understanding of this management plan and all questions were answered at this time.    Thanks for the opportunity to participate in the care of this patient, I will keep you updated on their  progress.    CC: Laura Alatorre MD [facility]  Sports Medicine Physician  [facility]    -----  Chief Complaint   Patient presents with     Middle Back - Pain, Cervical/Thoracic     Lower Back - Lumbar/SI, Pain       SUBJECTIVE  Paulette Floers is a/an 57 year old female who is seen in consultation at the request of  Laura Sarah PA-C for evaluation of middle back, lower back and left hip pain    The patient is seen by themselves.    Onset: 2 years(s) ago, in the last year it is worsening . Patient describes injury as being on a swing, falling off a swing and striking a retaining wall. She was not able to walk after that   Location of Pain: middle spine, thoracic, lumbar   Worsened by: sitting (feels pressure) in any position  Better with: pain into left hip with movement  Treatments tried: ice, heat, Tylenol, ibuprofen and previous imaging (MRI 7/14/22 and xray 2018)  Associated symptoms: swelling, numbness and pressure feeling     Orthopedic/Surgical history: YES - Date: chronic back pain   Social History/Occupation: working at a desk, sitting    No family history pertinent to patient's problem today.    REVIEW OF SYSTEMS:  Review of Systems  Skin: no bruising, no swelling  Musculoskeletal: as above  Neurologic: no numbness, paresthesias  Remainder of review of systems is negative including constitutional, CV, pulmonary, GI, except as noted in HPI or medical history.    OBJECTIVE:  /69   Pulse 75   Wt 96.2 kg (212 lb)   LMP  (LMP Unknown)   BMI 37.26 kg/m     General: healthy, alert and in no distress  HEENT: no scleral icterus or conjunctival erythema  Skin: no suspicious lesions or rash. No jaundice.  CV: distal perfusion intact  Resp: normal respiratory effort without conversational dyspnea   Psych: normal mood and affect  Gait: normal steady gait with appropriate coordination and balance  Neuro: Normal light sensory exam of lower extremity    Low back  exam:    Inspection:     no visible deformity in the low back       normal skin       normal vascular       normal lymphatic    Posture:      rounded shoulders and upper back    Tender:     Points to mid back, left gluteal muscle    Non Tender:     remainder of lumbar spine    ROM:      full flexion       full extension    Strength:     hip flexion 5/5 bilateral       knee extension 5/5 bilateral       ankle dorsiflexion 5/5 bilateral       ankle plantarflexion 5/5 bilateral       dorsiflexion of the great toe 5/5 bilateral    Reflexes:     patellar (L3, L4) symmetric normal       achilles tendons (S1) symmetric normal    Sensation:    grossly intact throughout lower extremities    Special tests:      straight leg raise left negative        straight leg raise right negative    Bilateral hip exam    Inspection:      no edema or ecchymosis in hip area    Tender:      Left sided SI and into buttocks mild    Non Tender:      remainder of the hip area bilateral    ROM:     Full active and passive ROM  bilateral      Some pain with internal and external rotation on the left    Strength:      flexion 5/5 bilateral       abduction 5/5 bilateral       adduction 5/5 bilateral    Sensation:      grossly intact in hip and thigh    Special Tests:      positive (+) PETROS left       positive (+) FADIR left    RADIOLOGY:  I independently visualized and reviewed these images with the patient  Reviewed Prior X-rays including:  - Pelvis XR 12/28/2020: no acute bony abnormality, no significant degenerative change  - Lumbar and Thoracic XR 10/2/2018: mild degenerative change  - Lumbar MRI 7/14/2022: mild - moderate disc degeneration, 1cm cystic structure L3 vertebral body  - Thoracic MRI 7/14/2022: degenerative changes most pronounced at T9-T10      Review of prior external note(s) from - PCP  Review of the result(s) of each unique test - Xrays and MRI             Again, thank you for allowing me to participate in the care of your  patient.        Sincerely,        Goldie Alatorre MD

## 2022-08-10 NOTE — PATIENT INSTRUCTIONS
We discussed these other possible diagnosis:  - Chronic low back pain with left hip pain. Thoracic disc on MRI possibly contributing, left sided SI joint pain and hip impingement also possibly contributing. Symptoms not consistent with lumbar radicular pain.  - Would recommend Spine Referral given L3 Cyst    Plan:  - Today's Plan of Care:  Referral to a Spine Surgeon given L3 Bone Cyst  Rehab: [facility] - [phone]  Discussed activity considerations and other supportive care including Ice/Heat, OTC and other topical medications as needed.    -We also discussed other future treatment options:  MRI or other imaging left hip    Follow Up: 6 - 8 weeks    If you have any further questions for your physician or physician s care team you can call [phone] and use option 3 to leave a voice message.

## 2022-08-10 NOTE — PROGRESS NOTES
ASSESSMENT & PLAN    Paulette was seen today for pain, cervical/thoracic, lumbar/si and pain.    Diagnoses and all orders for this visit:    Left hip pain  -     Physical Therapy Referral; Future    Chronic midline thoracic back pain  -     Spine Referral  -     Spine  Referral; Future  -     Physical Therapy Referral; Future    Chronic left-sided low back pain without sciatica  -     Spine Referral  -     Spine  Referral; Future  -     Physical Therapy Referral; Future    Abnormal MRI  -     Spine Referral  -     Spine  Referral; Future  -     Physical Therapy Referral; Future    Left hip impingement syndrome  -     Physical Therapy Referral; Future      This issue is chronic and Unchanged.    We discussed these other possible diagnosis:  - Chronic low back pain with left hip pain. Thoracic disc on MRI possibly contributing, left sided SI joint pain and hip impingement also possibly contributing. Symptoms not consistent with lumbar radicular pain.  - Would recommend Spine Referral given L3 Cyst    Plan:  - Today's Plan of Care:  Referral to a Spine Surgeon given L3 Bone Cyst  Rehab: Physical Therapy: [facility] - [phone]  Discussed activity considerations and other supportive care including Ice/Heat, OTC and other topical medications as needed.    -We also discussed other future treatment options:  MRI or other imaging left hip    Follow Up: 6 - 8 weeks    Concerning signs and symptoms were reviewed.  The patient expressed understanding of this management plan and all questions were answered at this time.    Thanks for the opportunity to participate in the care of this patient, I will keep you updated on their progress.    CC: Laura Alatorre MD [facility]  Sports Medicine Physician  [facility]    -----  Chief Complaint   Patient presents with     Middle Back - Pain, Cervical/Thoracic     Lower Back - Lumbar/SI, Pain        SUBJECTIVE  Paulette Flores is a/an 57 year old female who is seen in consultation at the request of  Laura Sarah PA-C for evaluation of middle back, lower back and left hip pain    The patient is seen by themselves.    Onset: 2 years(s) ago, in the last year it is worsening . Patient describes injury as being on a swing, falling off a swing and striking a retaining wall. She was not able to walk after that   Location of Pain: middle spine, thoracic, lumbar   Worsened by: sitting (feels pressure) in any position  Better with: pain into left hip with movement  Treatments tried: ice, heat, Tylenol, ibuprofen and previous imaging (MRI 7/14/22 and xray 2018)  Associated symptoms: swelling, numbness and pressure feeling     Orthopedic/Surgical history: YES - Date: chronic back pain   Social History/Occupation: working at a desk, sitting    No family history pertinent to patient's problem today.    REVIEW OF SYSTEMS:  Review of Systems  Skin: no bruising, no swelling  Musculoskeletal: as above  Neurologic: no numbness, paresthesias  Remainder of review of systems is negative including constitutional, CV, pulmonary, GI, except as noted in HPI or medical history.    OBJECTIVE:  /69   Pulse 75   Wt 96.2 kg (212 lb)   LMP  (LMP Unknown)   BMI 37.26 kg/m     General: healthy, alert and in no distress  HEENT: no scleral icterus or conjunctival erythema  Skin: no suspicious lesions or rash. No jaundice.  CV: distal perfusion intact  Resp: normal respiratory effort without conversational dyspnea   Psych: normal mood and affect  Gait: normal steady gait with appropriate coordination and balance  Neuro: Normal light sensory exam of lower extremity    Low back exam:    Inspection:     no visible deformity in the low back       normal skin       normal vascular       normal lymphatic    Posture:      rounded shoulders and upper back    Tender:     Points to mid back, left gluteal muscle    Non Tender:      remainder of lumbar spine    ROM:      full flexion       full extension    Strength:     hip flexion 5/5 bilateral       knee extension 5/5 bilateral       ankle dorsiflexion 5/5 bilateral       ankle plantarflexion 5/5 bilateral       dorsiflexion of the great toe 5/5 bilateral    Reflexes:     patellar (L3, L4) symmetric normal       achilles tendons (S1) symmetric normal    Sensation:    grossly intact throughout lower extremities    Special tests:      straight leg raise left negative        straight leg raise right negative    Bilateral hip exam    Inspection:      no edema or ecchymosis in hip area    Tender:      Left sided SI and into buttocks mild    Non Tender:      remainder of the hip area bilateral    ROM:     Full active and passive ROM  bilateral      Some pain with internal and external rotation on the left    Strength:      flexion 5/5 bilateral       abduction 5/5 bilateral       adduction 5/5 bilateral    Sensation:      grossly intact in hip and thigh    Special Tests:      positive (+) PETROS left       positive (+) FADIR left    RADIOLOGY:  I independently visualized and reviewed these images with the patient  Reviewed Prior X-rays including:  - Pelvis XR 12/28/2020: no acute bony abnormality, no significant degenerative change  - Lumbar and Thoracic XR 10/2/2018: mild degenerative change  - Lumbar MRI 7/14/2022: mild - moderate disc degeneration, 1cm cystic structure L3 vertebral body  - Thoracic MRI 7/14/2022: degenerative changes most pronounced at T9-T10      Review of prior external note(s) from - PCP  Review of the result(s) of each unique test - Xrays and MRI

## 2022-08-17 ENCOUNTER — THERAPY VISIT (OUTPATIENT)
Dept: PHYSICAL THERAPY | Facility: CLINIC | Age: 57
End: 2022-08-17
Attending: PEDIATRICS
Payer: COMMERCIAL

## 2022-08-17 DIAGNOSIS — M54.6 CHRONIC MIDLINE THORACIC BACK PAIN: ICD-10-CM

## 2022-08-17 DIAGNOSIS — M25.552 LEFT HIP PAIN: ICD-10-CM

## 2022-08-17 DIAGNOSIS — G89.29 CHRONIC LEFT-SIDED LOW BACK PAIN WITHOUT SCIATICA: ICD-10-CM

## 2022-08-17 DIAGNOSIS — M25.852 LEFT HIP IMPINGEMENT SYNDROME: ICD-10-CM

## 2022-08-17 DIAGNOSIS — M25.552 HIP PAIN, LEFT: ICD-10-CM

## 2022-08-17 DIAGNOSIS — R93.89 ABNORMAL MRI: ICD-10-CM

## 2022-08-17 DIAGNOSIS — G89.29 CHRONIC MIDLINE THORACIC BACK PAIN: ICD-10-CM

## 2022-08-17 DIAGNOSIS — M54.50 CHRONIC LEFT-SIDED LOW BACK PAIN WITHOUT SCIATICA: ICD-10-CM

## 2022-08-17 PROCEDURE — 97110 THERAPEUTIC EXERCISES: CPT | Mod: GP | Performed by: PHYSICAL THERAPIST

## 2022-08-17 PROCEDURE — 97161 PT EVAL LOW COMPLEX 20 MIN: CPT | Mod: GP | Performed by: PHYSICAL THERAPIST

## 2022-08-17 ASSESSMENT — ACTIVITIES OF DAILY LIVING (ADL)
GETTING_INTO_AND_OUT_OF_A_BATHTUB: NO DIFFICULTY AT ALL
HOS_ADL_HIGHEST_POTENTIAL_SCORE: 64
HOS_ADL_COUNT: 16
LIGHT_TO_MODERATE_WORK: NO DIFFICULTY AT ALL
PUTTING_ON_SOCKS_AND_SHOES: NO DIFFICULTY AT ALL
WALKING_DOWN_STEEP_HILLS: NO DIFFICULTY AT ALL
GETTING_INTO_AND_OUT_OF_AN_AVERAGE_CAR: NO DIFFICULTY AT ALL
SITTING_FOR_15_MINUTES: EXTREME DIFFICULTY
HEAVY_WORK: SLIGHT DIFFICULTY
STANDING_FOR_15_MINUTES: NO DIFFICULTY AT ALL
WALKING_15_MINUTES_OR_GREATER: NO DIFFICULTY AT ALL
HOW_WOULD_YOU_RATE_YOUR_CURRENT_LEVEL_OF_FUNCTION_DURING_YOUR_USUAL_ACTIVITIES_OF_DAILY_LIVING_FROM_0_TO_100_WITH_100_BEING_YOUR_LEVEL_OF_FUNCTION_PRIOR_TO_YOUR_HIP_PROBLEM_AND_0_BEING_THE_INABILITY_TO_PERFORM_ANY_OF_YOUR_USUAL_DAILY_ACTIVITIES?: 99
RECREATIONAL_ACTIVITIES: NO DIFFICULTY AT ALL
GOING_UP_1_FLIGHT_OF_STAIRS: NO DIFFICULTY AT ALL
WALKING_UP_STEEP_HILLS: NO DIFFICULTY AT ALL
TWISTING/PIVOTING_ON_INVOLVED_LEG: NO DIFFICULTY AT ALL
HOS_ADL_ITEM_SCORE_TOTAL: 62
WALKING_APPROXIMATELY_10_MINUTES: NO DIFFICULTY AT ALL
STEPPING_UP_AND_DOWN_CURBS: NO DIFFICULTY AT ALL
WALKING_INITIALLY: NO DIFFICULTY AT ALL
GOING_DOWN_1_FLIGHT_OF_STAIRS: NO DIFFICULTY AT ALL
HOS_ADL_SCORE(%): 96.88
ROLLING_OVER_IN_BED: SLIGHT DIFFICULTY

## 2022-08-17 NOTE — PROGRESS NOTES
Physical Therapy Initial Evaluation  Subjective:  The history is provided by the patient. No  was used.   Therapist Generated HPI Evaluation  Problem details: Date of MD order for this episode was 8-10-22. Paulette is being seen  For L hip pain, LBP. Original injury 2 yrs ago when she fell off a swing and hit a wall  Main pain is L PSIS area which radiates to lateral hip. She states she gets swelling and has to use ice. Most difficulty is with sitting and she feels pressure lower thoracic area with sitting. The pressure is new(started Jan/Feb 2022), the other pain has been since her fall 2 yrs ago.         Type of problem:  Left hip (L LB thoracic pressure).    This is a chronic condition.  Condition occurred with:  Other reason (fell off swing and hit retaining wall).  Where condition occurred: at home.  Site of Pain: L PSIS and lateral hip.  Pain is described as aching (pain and pressure) and is intermittent (with sitting only).  Pain radiates to:  No radiation. Pain is the same all the time.  Since onset symptoms are gradually improving.  Symptoms are exacerbated by sitting and other (lifting outside)  and relieved by other (being up on her feet moving).      Restrictions due to condition include:  Working in normal job without restrictions.  Barriers include:  None as reported by patient.    Patient Health History  Paulette Flores being seen for Lower middle back pressure.     Date of Onset: 2 yrs ago.   Problem occurred: a fall   Pain score: ranges 1-5/10.  General health as reported by patient is excellent.  Pertinent medical history includes: overweight, smoking and other (vertigo).   Red flags:  None as reported by patient.  Medical allergies: none.   Surgeries include:  None.    Current medications:  None.    Current occupation is admin support.   Primary job tasks include:  Computer work, prolonged standing and prolonged sitting.                                    Objective:    Gait:     Gait Type:  Normal         Flexibility/Screens:       Lower Extremity:  Decreased left lower extremity flexibility:Piriformis; Hip Flexors; Quadriceps and Hamstrings    Decreased right lower extremity flexibility:  Piriformis; Hip Flexors; Quadriceps and Hamstrings               Lumbar/SI Evaluation  ROM:    AROM Lumbar:   Flexion:            50%, repeated 10x no pain before and after  Ext:                    75% repeated 10x  central LBP, none prior   Side Bend:        Left:  Wnl    Right:  Wnl  Rotation:           Left:  Wnl    Right:  75% with rib pull  Side Glide:        Left:     Right:         Strength: lacks good abdominal control with transitions  Lumbar Myotomes:    T12-L3 (Hip Flex):  Left: 5    Right: 5  L2-4 (Quads):  Left:  5    Right:  5  L4 (Ankle DF):  Left:  5    Right:  5  L5 (Great Toe Ext): Left: 5    Right: 5     Lumbar DTR's:  normal        Lumbar Dermtomes:  normal                Neural Tension/Mobility:      Left side:SLR  negative.     Right side:   SLR  negative.   Lumbar Palpation:    Tenderness present at Left:    Erector Spinae and PSIS  Tenderness not present at Left:    Quadratus Lumborum; Piriformis; ASIS; Greater Trochanter or Ischial Tuberosity    Tenderness not present at Right:  Quadratus Lumborum; Erector Spinae; Piriformis; PSIS; ASIS; Greater Trochanter or Ischial Tuberosity    Lumbar Provocation:      Left negative with:  PROM hip    Right negative with:  PROM hip  Spinal Segmental Conclusions: P/a glide painful lower lumbar, mild stiffness          SI joint/Sacrum:    R anterior ilial rotation                                            Hip Evaluation    Hip Strength:      Extension:  Left: 3+/5  Pain:Right: 3+/5    Pain:    Abduction:  Left: 3+/5     Pain:Right: 4-/5    Pain:                  Hip Special Testing:    Left hip positive for the following special tests:  Anna and Fadir/Labrum      Hip Palpation:    Left hip tenderness present at:   PSIS  Left hip tenderness  not present at:  Ischial Tuberosity; Greater Trachanter; hip flexors; Piriformis or ASIS               General     ROS    Assessment/Plan:    Patient is a 57 year old female with lumbar complaints.    Patient has the following significant findings with corresponding treatment plan.                Diagnosis 1:  L LBP, L hip pain, lower thoracic pressure  Pain -  manual therapy, self management, education and home program  Decreased ROM/flexibility - manual therapy, therapeutic exercise and home program  Decreased joint mobility - manual therapy, therapeutic exercise and home program  Decreased strength - therapeutic exercise, therapeutic activities and home program  Decreased proprioception - neuro re-education, therapeutic activities and home program  Impaired muscle performance - neuro re-education and home program  Decreased function - therapeutic activities and home program    Therapy Evaluation Codes:   1) History comprised of:   Personal factors that impact the plan of care:      Age, Past/current experiences and Time since onset of symptoms.    Comorbidity factors that impact the plan of care are:      Overweight and Smoking.     Medications impacting care: None.  2) Examination of Body Systems comprised of:   Body structures and functions that impact the plan of care:      Lumbar spine, Pelvis, Sacral illiac joint and Thoracic Spine.   Activity limitations that impact the plan of care are:      Bending, Driving, Lifting, Reading/Computer work, Sitting, Working and Laying down.  3) Clinical presentation characteristics are:   Stable/Uncomplicated.  4) Decision-Making    Low complexity using standardized patient assessment instrument and/or measureable assessment of functional outcome.  Cumulative Therapy Evaluation is: Low complexity.    Previous and current functional limitations:  (See Goal Flow Sheet for this information)    Short term and Long term goals: (See Goal Flow Sheet for this information)      Communication ability:  Patient appears to be able to clearly communicate and understand verbal and written communication and follow directions correctly.  Treatment Explanation - The following has been discussed with the patient:   RX ordered/plan of care  Anticipated outcomes  Possible risks and side effects  This patient would benefit from PT intervention to resume normal activities.   Rehab potential is good.    Frequency:  1 X week, once daily  Duration:  for 6 weeks  Discharge Plan:  Achieve all LTG.  Independent in home treatment program.  Reach maximal therapeutic benefit.    Please refer to the daily flowsheet for treatment today, total treatment time and time spent performing 1:1 timed codes.

## 2022-08-24 ENCOUNTER — THERAPY VISIT (OUTPATIENT)
Dept: PHYSICAL THERAPY | Facility: CLINIC | Age: 57
End: 2022-08-24
Attending: PEDIATRICS
Payer: COMMERCIAL

## 2022-08-24 DIAGNOSIS — M25.552 HIP PAIN, LEFT: ICD-10-CM

## 2022-08-24 DIAGNOSIS — M54.50 CHRONIC LEFT-SIDED LOW BACK PAIN WITHOUT SCIATICA: Primary | ICD-10-CM

## 2022-08-24 DIAGNOSIS — G89.29 CHRONIC LEFT-SIDED LOW BACK PAIN WITHOUT SCIATICA: Primary | ICD-10-CM

## 2022-08-24 PROCEDURE — 97110 THERAPEUTIC EXERCISES: CPT | Mod: GP | Performed by: PHYSICAL THERAPIST

## 2022-08-31 ENCOUNTER — THERAPY VISIT (OUTPATIENT)
Dept: PHYSICAL THERAPY | Facility: CLINIC | Age: 57
End: 2022-08-31
Attending: PEDIATRICS
Payer: COMMERCIAL

## 2022-08-31 DIAGNOSIS — M54.50 CHRONIC LEFT-SIDED LOW BACK PAIN WITHOUT SCIATICA: Primary | ICD-10-CM

## 2022-08-31 DIAGNOSIS — M25.552 HIP PAIN, LEFT: ICD-10-CM

## 2022-08-31 DIAGNOSIS — G89.29 CHRONIC LEFT-SIDED LOW BACK PAIN WITHOUT SCIATICA: Primary | ICD-10-CM

## 2022-08-31 PROCEDURE — 97110 THERAPEUTIC EXERCISES: CPT | Mod: GP | Performed by: PHYSICAL THERAPIST

## 2022-09-20 ENCOUNTER — THERAPY VISIT (OUTPATIENT)
Dept: PHYSICAL THERAPY | Facility: CLINIC | Age: 57
End: 2022-09-20
Payer: COMMERCIAL

## 2022-09-20 DIAGNOSIS — G89.29 CHRONIC LEFT-SIDED LOW BACK PAIN WITHOUT SCIATICA: Primary | ICD-10-CM

## 2022-09-20 DIAGNOSIS — M54.50 CHRONIC LEFT-SIDED LOW BACK PAIN WITHOUT SCIATICA: Primary | ICD-10-CM

## 2022-09-20 DIAGNOSIS — M25.552 HIP PAIN, LEFT: ICD-10-CM

## 2022-09-20 PROCEDURE — 97110 THERAPEUTIC EXERCISES: CPT | Mod: GP | Performed by: PHYSICAL THERAPIST

## 2022-09-20 ASSESSMENT — ACTIVITIES OF DAILY LIVING (ADL)
RECREATIONAL_ACTIVITIES: NO DIFFICULTY AT ALL
WALKING_UP_STEEP_HILLS: NO DIFFICULTY AT ALL
SITTING_FOR_15_MINUTES: NO DIFFICULTY AT ALL
STEPPING_UP_AND_DOWN_CURBS: NO DIFFICULTY AT ALL
GOING_UP_1_FLIGHT_OF_STAIRS: NO DIFFICULTY AT ALL
WALKING_INITIALLY: NO DIFFICULTY AT ALL
LIGHT_TO_MODERATE_WORK: NO DIFFICULTY AT ALL
PUTTING_ON_SOCKS_AND_SHOES: NO DIFFICULTY AT ALL
TWISTING/PIVOTING_ON_INVOLVED_LEG: NO DIFFICULTY AT ALL
GOING_DOWN_1_FLIGHT_OF_STAIRS: NO DIFFICULTY AT ALL
WALKING_15_MINUTES_OR_GREATER: NO DIFFICULTY AT ALL
GETTING_INTO_AND_OUT_OF_AN_AVERAGE_CAR: NO DIFFICULTY AT ALL
HEAVY_WORK: NO DIFFICULTY AT ALL
STANDING_FOR_15_MINUTES: NO DIFFICULTY AT ALL
WALKING_DOWN_STEEP_HILLS: NO DIFFICULTY AT ALL
ROLLING_OVER_IN_BED: NO DIFFICULTY AT ALL
WALKING_APPROXIMATELY_10_MINUTES: NO DIFFICULTY AT ALL
HOW_WOULD_YOU_RATE_YOUR_CURRENT_LEVEL_OF_FUNCTION_DURING_YOUR_USUAL_ACTIVITIES_OF_DAILY_LIVING_FROM_0_TO_100_WITH_100_BEING_YOUR_LEVEL_OF_FUNCTION_PRIOR_TO_YOUR_HIP_PROBLEM_AND_0_BEING_THE_INABILITY_TO_PERFORM_ANY_OF_YOUR_USUAL_DAILY_ACTIVITIES?: 100

## 2022-09-20 NOTE — PROGRESS NOTES
"Subjective:  HPI  Physical Exam  Oswestry Score: 6.67 %                 Objective:  System    Physical Exam    General     ROS    Assessment/Plan:    DISCHARGE REPORT    Progress reporting period is from 8-17-22 to 8-31-22, 4 visits.       SUBJECTIVE  Subjective changes noted by patient:  .  Subjective: States she is feeling better. \"I like my exercises\". Feels ready finish up with PT. Slight pain in low back as she had done a lot of driving over the weekend, the hips are not painful. Prior to drive low back was doing well. States she does not have the pressure in her back like she did. Very pleased with progress and consistent with HEP     Current Pain level: 0/10.      Initial Pain level:  (ranges 1-5/10).   Changes in function:  Yes (See Goal flowsheet attached for changes in current functional level)  Adverse reaction to treatment or activity: None    OBJECTIVE  Changes noted in objective findings:  Yes,   Objective: Improved flexibility in trunk, hamstrings, glutes. TAROM WNL except flx 75% and ext 80%(improved). Improved hip strength with B abd 5/5 and ext 4/5.     ASSESSMENT/PLAN  Updated problem list and treatment plan: Diagnosis 1:  L hip pain, LBP, thoracic pain-continue with HEP    STG/LTGs have been met or progress has been made towards goals:  Yes (See Goal flow sheet completed today.)  Assessment of Progress: The patient's condition is improving.  Self Management Plans:  Patient is independent in a home treatment program.  Patient is independent in self management of symptoms.  I have re-evaluated this patient and find that the nature, scope, duration and intensity of the therapy is appropriate for the medical condition of the patient.  Paulette continues to require the following intervention to meet STG and LTG's:  PT intervention is no longer required to meet STG/LTG.    Recommendations:  This patient is ready to be discharged from therapy and continue their home treatment program.    Please refer to " the daily flowsheet for treatment today, total treatment time and time spent performing 1:1 timed codes.

## 2022-11-20 ENCOUNTER — HEALTH MAINTENANCE LETTER (OUTPATIENT)
Age: 57
End: 2022-11-20

## 2022-12-22 ENCOUNTER — OFFICE VISIT (OUTPATIENT)
Dept: FAMILY MEDICINE | Facility: CLINIC | Age: 57
End: 2022-12-22
Payer: COMMERCIAL

## 2022-12-22 VITALS
DIASTOLIC BLOOD PRESSURE: 82 MMHG | HEART RATE: 67 BPM | OXYGEN SATURATION: 100 % | HEIGHT: 63 IN | RESPIRATION RATE: 20 BRPM | TEMPERATURE: 97.6 F | BODY MASS INDEX: 37.92 KG/M2 | SYSTOLIC BLOOD PRESSURE: 114 MMHG | WEIGHT: 214 LBS

## 2022-12-22 DIAGNOSIS — H92.01 OTALGIA, RIGHT: Primary | ICD-10-CM

## 2022-12-22 PROCEDURE — 99213 OFFICE O/P EST LOW 20 MIN: CPT | Performed by: PHYSICIAN ASSISTANT

## 2022-12-22 ASSESSMENT — PAIN SCALES - GENERAL: PAINLEVEL: MODERATE PAIN (5)

## 2022-12-22 ASSESSMENT — ENCOUNTER SYMPTOMS
DIAPHORESIS: 0
CHILLS: 0
NERVOUS/ANXIOUS: 0
SHORTNESS OF BREATH: 0
RHINORRHEA: 0
FEVER: 0
HEADACHES: 0
COUGH: 1
SORE THROAT: 0

## 2022-12-22 NOTE — PROGRESS NOTES
Assessment & Plan     Otalgia, right  Patient is a 57-year-old female who presents to right ear pain ongoing for the last few days as well as scant drainage from the ear.  Vital signs normal.  Physical exam without signs of OE/OM.  Patient does have partial cerumen impaction and notes she usually sees ENT to have this removed.  Patient does have tenderness palpation over TMJ.  Discussed possibility of jaw clenching/grinding as cause of ear pain.  Recommended heat/ice, Tylenol, and OTC bite guard.  TMJ handout provided.  Return precautions discussed.      See Patient Instructions    Return in about 1 week (around 12/29/2022), or if symptoms worsen or fail to improve.    Dalia Gilbert PA-C  [facility] KELTON Caldwell is a 57 year old, presenting for the following health issues:  Ear Problem      History of Present Illness       Reason for visit:  Ear hurts  Symptom onset:  1-3 days ago  Symptom intensity:  Moderate  Symptom progression:  Worsening  Had these symptoms before:  Yes  Has tried/received treatment for these symptoms:  Yes  Previous treatment was successful:  Yes  Prior treatment description:  Medication  What makes it worse:  No  What makes it better:  Medication    She eats 4 or more servings of fruits and vegetables daily.She consumes 0 sweetened beverage(s) daily.She exercises with enough effort to increase her heart rate 20 to 29 minutes per day.  She exercises with enough effort to increase her heart rate 4 days per week.   She is taking medications regularly.     Patient notes right ear pain. No change in hearing. Mild drainage. Patient has been using     She had 6 surgeries on the left ear due to eustachian tube issues.       Review of Systems   Constitutional: Negative for chills, diaphoresis and fever.   HENT: Negative for congestion, rhinorrhea and sore throat.    Respiratory: Positive for cough (mild). Negative for shortness of breath.    Cardiovascular: Negative  "for chest pain.   Skin: Negative for rash.   Neurological: Negative for headaches.   Psychiatric/Behavioral: The patient is not nervous/anxious.             Objective    /82   Pulse 67   Temp 97.6  F (36.4  C) (Tympanic)   Resp 20   Ht 1.6 m (5' 3\")   Wt 97.1 kg (214 lb)   LMP  (LMP Unknown)   SpO2 100%   BMI 37.91 kg/m    Body mass index is 37.91 kg/m .  Physical Exam  Vitals and nursing note reviewed.   Constitutional:       General: She is not in acute distress.     Appearance: Normal appearance.   HENT:      Head: Normocephalic and atraumatic.      Right Ear: Tympanic membrane, ear canal and external ear normal. There is impacted cerumen (partial).      Left Ear: Tympanic membrane, ear canal and external ear normal. There is impacted cerumen (partial).      Mouth/Throat:      Mouth: Mucous membranes are moist.      Pharynx: Oropharynx is clear.   Eyes:      Extraocular Movements: Extraocular movements intact.      Pupils: Pupils are equal, round, and reactive to light.   Cardiovascular:      Rate and Rhythm: Normal rate.   Pulmonary:      Effort: Pulmonary effort is normal.   Musculoskeletal:         General: Normal range of motion.      Cervical back: Normal range of motion.   Skin:     General: Skin is warm and dry.   Neurological:      General: No focal deficit present.      Mental Status: She is alert.   Psychiatric:         Mood and Affect: Mood normal.         Behavior: Behavior normal.                            "

## 2022-12-22 NOTE — PATIENT INSTRUCTIONS
Good news, your ear exam does not show any ear infection.  Your symptoms may be due to clenching or grinding your jaw.  I have added a handout about a severe form of clenching and grinding called TMJ. You can try the home treatments listed including heat/ice and an over the counter bite guard (look in the pharmacy section) to see if this helps provide relief.  Please reach out with any questions or concerns.  Return to clinic for new or worsening symptoms.

## 2023-02-10 NOTE — PROGRESS NOTES
Outpatient Physical Therapy Discharge Note     Patient: Paulette Flores  : 1965    Beginning/End Dates of Reporting Period:  2022 to 2022    Referring Provider: MUKUL Long    Therapy Diagnosis:    L posterior canalithasis BPPV     Patient did not return for follow up treatments as directed.  Goal status and current objective information is therefore unknown.  Discharge from PT services at this time for this episode of treatment. Please see attached documentation under this episode of care for further information including dates of service, start of care date, referring physician, Dx, treatment plan, treatments, etc.    Please contact me with any questions or concerns.    Thank you for your referral.    Avril Nettles, PT, DPT, CLT  Physical Therapist & Certified Lymphedema Therapist  [facility]

## 2023-02-13 ENCOUNTER — TELEPHONE (OUTPATIENT)
Dept: FAMILY MEDICINE | Facility: CLINIC | Age: 58
End: 2023-02-13
Payer: COMMERCIAL

## 2023-02-13 NOTE — TELEPHONE ENCOUNTER
Reason for Call:  Other call back    Detailed comments: Patient is requesting a urine lab order because she thinks she has an UTI.  Patient would like a call back on her work phone.       Phone Number Patient can be reached at: Work number on file:  [phone] (work)    Best Time: Any    Can we leave a detailed message on this number? YES    Call taken on 2/13/2023 at 8:01 AM by Roxana Nolasco

## 2023-02-13 NOTE — TELEPHONE ENCOUNTER
Left message on voice mail for patient to call clinic.   [phone]    Patient can complete an E-Visit (if appropriate).     Denise Arthur RN BSN  [facility]

## 2023-02-13 NOTE — TELEPHONE ENCOUNTER
Patient calling back, reports having burning with urination started last evening.    Denies blood in urine, fever, vomiting, or back ache; does not get frequent UTI's (last was 30 years ago).    She will submit an E visit for this.    Meri Fritz RN  [facility]

## 2023-03-08 ENCOUNTER — OFFICE VISIT (OUTPATIENT)
Dept: ORTHOPEDICS | Facility: CLINIC | Age: 58
End: 2023-03-08
Payer: COMMERCIAL

## 2023-03-08 VITALS
SYSTOLIC BLOOD PRESSURE: 142 MMHG | HEART RATE: 71 BPM | DIASTOLIC BLOOD PRESSURE: 82 MMHG | BODY MASS INDEX: 39.56 KG/M2 | WEIGHT: 215 LBS | HEIGHT: 62 IN

## 2023-03-08 DIAGNOSIS — M25.562 ACUTE PAIN OF LEFT KNEE: Primary | ICD-10-CM

## 2023-03-08 PROCEDURE — 99213 OFFICE O/P EST LOW 20 MIN: CPT | Performed by: FAMILY MEDICINE

## 2023-03-08 ASSESSMENT — PAIN SCALES - GENERAL: PAINLEVEL: MODERATE PAIN (4)

## 2023-03-08 NOTE — PROGRESS NOTES
"Paulette Flores  :  1965  DOS: 3/8/2023  MRN: [id number]    Sports Medicine Clinic Visit    PCP: Laura Sarah    Paulette Flores is a 57 year old female who is seen as a WALK IN patient presenting with acute left knee pain.    Injury: Patient describes injury as she was snow blowing and noticed anterior knee pain, she mann snot recall falling or twisting the knee, pain began approximately 13 day(s) ago.  Pain located over left anterior aspect of her knee, nonradiating.  Additional Features:  Positive: swelling.  Symptoms are better with Ice, Ibuprofen and Elevation.  Symptoms are worse with: extension, flexion and walking.  Other evaluation and/or treatments so far consists of: Nothing.  Recent imaging completed: No recent imaging completed.  Prior History of related problems: none    Social History: Patient works for the [place] [employer]    Review of Systems  Musculoskeletal: as above  Remainder of review of systems is negative including constitutional, CV, pulmonary, GI, Skin and Neurologic except as noted in HPI or medical history.    No past medical history on file.  Past Surgical History:   Procedure Laterality Date     COLONOSCOPY N/A 2016    Procedure: COLONOSCOPY;  Surgeon: Drake Evans MD;  Location: [facility]     ENT SURGERY      has had 6 ear surgeries and 1 nose surgery     Family History   Problem Relation Age of Onset     Hypertension Mother      Hypothyroidism Mother      Hypertension Father      Graves' disease Sister      Hypothyroidism Sister      Coronary Artery Disease Maternal Grandmother         MI     Hypertension Maternal Grandmother      Cerebrovascular Disease Maternal Grandfather      Diabetes Maternal Grandfather      Asthma Paternal Grandmother      Cerebrovascular Disease Paternal Grandfather      Breast Cancer No family hx of      Colon Cancer No family hx of        Objective  BP (!) 142/82   Pulse 71   Ht 1.575 m (5' 2\")   Wt 97.5 kg (215 lb)   LMP  (LMP " Unknown)   BMI 39.32 kg/m        General: healthy, alert and in no distress      HEENT: no scleral icterus or conjunctival erythema     Skin: no suspicious lesions or rash. No jaundice.     CV: regular rhythm by palpation, 2+ distal pulses, no pedal edema      Resp: normal respiratory effort without conversational dyspnea     Psych: normal mood and affect      Gait: moderately antalgic, appropriate coordination and balance     Neuro: normal light touch sensory exam of the extremities. Motor strength as noted below     Left Knee exam    ROM:        Full active and passive ROM with flexion and extension       Mild pain in terminal flexion    Inspection:       no visible ecchymosis        effusion noted small by palpation    Skin:       no visible deformities       well perfused       capillary refill brisk    Patellar Motion:        Normal patellar tracking noted through range of motion       Crepitus noted in the patellofemoral joint    Tender:        medial patellar border       medial joint line       lateral joint line    Non Tender:         remainder of knee area    Special Tests:        neg (-) Cordell       neg (-) Lachman       neg (-) anterior drawer       neg (-) posterior drawer       neg (-) varus at 0 deg and 30 deg       neg (-) valgus at 0 deg and 30 deg       no pain with forced extension    Evaluation of ipsilateral kinetic chain       normal strength with hip extension and abduction      Radiology  No formal imaging today at patient request after discussion    Assessment:  1. Acute pain of left knee        Plan:  Discussed the assessment with the patient.  Follow up: 2 weeks, appt scheduled if needed  2 weeks of knee pain after snowblowing driveway, no mechanical sx, no signs of instability on exam, predominantly WB joint pain, some pain in medial PF compartment as well  Suspect underlying DJD with active flare  Has tried oral NSAIDs, reviewed option for scheduled tylenol and topical voltaren gel  until f/u which would have a better safety profile  Reviewed option for US guided CSI +/- aspiration for labile sx, though would want XR first, plan XR at next visit if pain not resolved  RICE reviewed  PT order placed to work on ROM and advancing safe activity, acute cares as needed  Assistive device options reviewed  We discussed modified progressive pain-free activity as tolerated  Home handouts provided and supportive care reviewed  All questions were answered today  Contact us with additional questions or concerns  Signs and sx of concern reviewed      Matias Mckee DO, TERESSA  Sports Medicine Physician  [facility]              Disclaimer: This note consists of symbols derived from keyboarding, dictation and/or voice recognition software. As a result, there may be errors in the script that have gone undetected. Please consider this when interpreting information found in this chart.

## 2023-03-08 NOTE — LETTER
3/8/2023         RE: Paulette Flores  [address]  [place] [address]        Dear Colleague,    Thank you for referring your patient, Paulette Flores, to the [facility] [place]. Please see a copy of my visit note below.    Paulette Flores  :  1965  DOS: 3/8/2023  MRN: [id number]    Sports Medicine Clinic Visit    PCP: Laura Sarah    Paulette Flores is a 57 year old female who is seen as a WALK IN patient presenting with acute left knee pain.    Injury: Patient describes injury as she was snow blowing and noticed anterior knee pain, she mann snot recall falling or twisting the knee, pain began approximately 13 day(s) ago.  Pain located over left anterior aspect of her knee, nonradiating.  Additional Features:  Positive: swelling.  Symptoms are better with Ice, Ibuprofen and Elevation.  Symptoms are worse with: extension, flexion and walking.  Other evaluation and/or treatments so far consists of: Nothing.  Recent imaging completed: No recent imaging completed.  Prior History of related problems: none    Social History: Patient works for the [place] [employer]    Review of Systems  Musculoskeletal: as above  Remainder of review of systems is negative including constitutional, CV, pulmonary, GI, Skin and Neurologic except as noted in HPI or medical history.    No past medical history on file.  Past Surgical History:   Procedure Laterality Date     COLONOSCOPY N/A 2016    Procedure: COLONOSCOPY;  Surgeon: Drake Evans MD;  Location: [facility]     ENT SURGERY      has had 6 ear surgeries and 1 nose surgery     Family History   Problem Relation Age of Onset     Hypertension Mother      Hypothyroidism Mother      Hypertension Father      Graves' disease Sister      Hypothyroidism Sister      Coronary Artery Disease Maternal Grandmother         MI     Hypertension Maternal Grandmother      Cerebrovascular Disease Maternal Grandfather      Diabetes Maternal  "Grandfather      Asthma Paternal Grandmother      Cerebrovascular Disease Paternal Grandfather      Breast Cancer No family hx of      Colon Cancer No family hx of        Objective  BP (!) 142/82   Pulse 71   Ht 1.575 m (5' 2\")   Wt 97.5 kg (215 lb)   LMP  (LMP Unknown)   BMI 39.32 kg/m        General: healthy, alert and in no distress      HEENT: no scleral icterus or conjunctival erythema     Skin: no suspicious lesions or rash. No jaundice.     CV: regular rhythm by palpation, 2+ distal pulses, no pedal edema      Resp: normal respiratory effort without conversational dyspnea     Psych: normal mood and affect      Gait: moderately antalgic, appropriate coordination and balance     Neuro: normal light touch sensory exam of the extremities. Motor strength as noted below     Left Knee exam    ROM:        Full active and passive ROM with flexion and extension       Mild pain in terminal flexion    Inspection:       no visible ecchymosis        effusion noted small by palpation    Skin:       no visible deformities       well perfused       capillary refill brisk    Patellar Motion:        Normal patellar tracking noted through range of motion       Crepitus noted in the patellofemoral joint    Tender:        medial patellar border       medial joint line       lateral joint line    Non Tender:         remainder of knee area    Special Tests:        neg (-) Cordell       neg (-) Lachman       neg (-) anterior drawer       neg (-) posterior drawer       neg (-) varus at 0 deg and 30 deg       neg (-) valgus at 0 deg and 30 deg       no pain with forced extension    Evaluation of ipsilateral kinetic chain       normal strength with hip extension and abduction      Radiology  No formal imaging today at patient request after discussion    Assessment:  1. Acute pain of left knee        Plan:  Discussed the assessment with the patient.  Follow up: 2 weeks, appt scheduled if needed  2 weeks of knee pain after " snowblowing driveway, no mechanical sx, no signs of instability on exam, predominantly WB joint pain, some pain in medial PF compartment as well  Suspect underlying DJD with active flare  Has tried oral NSAIDs, reviewed option for scheduled tylenol and topical voltaren gel until f/u which would have a better safety profile  Reviewed option for US guided CSI +/- aspiration for labile sx, though would want XR first, plan XR at next visit if pain not resolved  RICE reviewed  PT order placed to work on ROM and advancing safe activity, acute cares as needed  Assistive device options reviewed  We discussed modified progressive pain-free activity as tolerated  Home handouts provided and supportive care reviewed  All questions were answered today  Contact us with additional questions or concerns  Signs and sx of concern reviewed      Matias Mckee DO, CAQ  Sports Medicine Physician  [facility]              Disclaimer: This note consists of symbols derived from keyboarding, dictation and/or voice recognition software. As a result, there may be errors in the script that have gone undetected. Please consider this when interpreting information found in this chart.        Again, thank you for allowing me to participate in the care of your patient.        Sincerely,        Matias Mckee DO

## 2023-03-09 ENCOUNTER — THERAPY VISIT (OUTPATIENT)
Dept: PHYSICAL THERAPY | Facility: CLINIC | Age: 58
End: 2023-03-09
Attending: FAMILY MEDICINE
Payer: COMMERCIAL

## 2023-03-09 DIAGNOSIS — M25.562 ACUTE PAIN OF LEFT KNEE: ICD-10-CM

## 2023-03-09 DIAGNOSIS — M25.562 LEFT KNEE PAIN: Primary | ICD-10-CM

## 2023-03-09 PROCEDURE — 97161 PT EVAL LOW COMPLEX 20 MIN: CPT | Mod: GP

## 2023-03-09 PROCEDURE — 97110 THERAPEUTIC EXERCISES: CPT | Mod: GP

## 2023-03-09 ASSESSMENT — ACTIVITIES OF DAILY LIVING (ADL)
SIT WITH YOUR KNEE BENT: NOT ANSWERED
GO UP STAIRS: NOT ANSWERED
LIMPING: THE SYMPTOM AFFECTS MY ACTIVITY MODERATELY
WALK: NOT ANSWERED
PAIN: THE SYMPTOM AFFECTS MY ACTIVITY MODERATELY
STIFFNESS: THE SYMPTOM AFFECTS MY ACTIVITY MODERATELY
SQUAT: NOT ANSWERED
KNEEL ON THE FRONT OF YOUR KNEE: NOT ANSWERED
WEAKNESS: I DO NOT HAVE THE SYMPTOM
SWELLING: THE SYMPTOM AFFECTS MY ACTIVITY SLIGHTLY
AS_A_RESULT_OF_YOUR_KNEE_INJURY,_HOW_WOULD_YOU_RATE_YOUR_CURRENT_LEVEL_OF_DAILY_ACTIVITY?: NOT ANSWERED
STAND: NOT ANSWERED
HOW_WOULD_YOU_RATE_THE_OVERALL_FUNCTION_OF_YOUR_KNEE_DURING_YOUR_USUAL_DAILY_ACTIVITIES?: NOT ANSWERED
GO DOWN STAIRS: NOT ANSWERED
KNEE_ACTIVITY_OF_DAILY_LIVING_SUM: 18
GIVING WAY, BUCKLING OR SHIFTING OF KNEE: I DO NOT HAVE THE SYMPTOM
RISE FROM A CHAIR: NOT ANSWERED

## 2023-03-09 NOTE — PROGRESS NOTES
Physical Therapy Initial Evaluation    Therapist Assessment: Paulette Flores is a 57 year old female patient presenting to Physical Therapy with L knee pain. Patient demonstrates decreased L knee flexion, pain with L SL squat, + Thessaly's L side, pain with resisted L knee flexion, - varus/valgus test at 0 deg and 30 deg knee flexion, and no pain with patellar mobility. Signs and symptoms are consistent with L knee OA vs L knee meniscal pathology. These impairments limit their ability to initiate walking after sitting for extended periods of time, ascend/descend stairs comfortably and squat. Skilled PT services are necessary in order to reduce impairments and improve independent function.    Subjective:  The history is provided by the patient.   Therapist Generated HPI Evaluation  Problem details: Patient reports to outpatient physical therapy with anterior L knee pain that started 14 days ago while snow blowing. She had swelling that started a couple days after snowblowing associated with pain. She wonders if she twisted her knee while snowblowing because she did not have pain prior to this event. She has been using Voltaren and Tylenol to help with the pain. The pain has been getting better, but yesterday the pain was terrible. She states that today the pain is slightly better but notices some tightness when she walks. She has the most difficulty going up and down stairs (one step at a time) and starting to walk (due to stiffness). She states that after she starts walking for a bit the pain and stiffness decrease. The pain/stiffness gets worse with extended periods of sitting.       Goals:   1) walking up and down stairs (currently one step at a time)  2) sit to stand with less pain.         Type of problem:  Left knee.    This is a new condition.  Condition occurred with:  A twist (unknown, thinking a twist with snow blowing).  Where condition occurred: at home.  Patient reports pain:  Anterior.  Pain quality:  stiffness. and is intermittent.  Pain is worse during the day.  Since onset symptoms are gradually improving.  Symptoms are exacerbated by ascending stairs, descending stairs and sitting  and relieved by NSAID's (Voltaren ).      Restrictions due to condition include:  Working in normal job without restrictions.  Barriers include:  None as reported by patient.    Patient Health History  Paulette Flores being seen for left knee pain.     Problem began: 2023.   Problem occurred: snow blowing   Pain is reported as 5/10 on pain scale.  General health as reported by patient is excellent.  Pertinent medical history includes: overweight, smoking and menopausal.     Medical allergies: other (pain meds).   Surgeries include:  Other (, gall bladder removal).        Current occupation is clerical.   Primary job tasks include:  Computer work.                                    Objective:    Gait:  First 3-4 steps after rising from chair antalgia with decreased stance time L LE, but after those steps resumed normal gait pattern.  Assistive Devices:  None                                                   Hip Evaluation    Hip Strength:      Extension:  Left: 4+/5  -  Pain:Right: 4+/5    -  Pain:    Abduction:  Left: /5    Pain:strong/pain freeRight: /5   Pain:strong/pain free  Adduction:  Left: /5   Pain:strong/pain free                         Knee Evaluation:  ROM:    AROM      Extension:  Left: 0    Right:  0  Flexion: Left: 115 + end range    Right: 122        Strength:     Extension:  Left: 5-/5    Pain:+/-      Right: 5/5    Pain:-  Flexion:  Left: 4/5    Pain:+      Right:/5  Strong/pain free  Pain:    Quad Set Left:  Good    Pain: -     Ligament Testing:    Varus 0:  Left:  Neg     Varus 30:  Left:  Neg    Valgus 0:  Left:  Neg    Valgus 30:  Left:  Neg            Special Tests: Special test for knee: Thessaly's: + L. DL squat: decreased depth bilaterally.         Edema:  Edema of the knee: Observation:  slight increased swelling distal L knee.    Mobility Testing:  Mobility testing: Patellar mobility: normal bilaterally, no pain bilaterally.            Functional Testing:  : SL squat: pain L side. Decreased depth with L>R side. SL stance: mild instabiltiy bilaterally.                   General     ROS    Assessment/Plan:    Patient is a 57 year old female with left side knee complaints.    Patient has the following significant findings with corresponding treatment plan.                Diagnosis 1:  L knee pain  Pain -  hot/cold therapy, electric stimulation, manual therapy, STS, splint/taping/bracing/orthotics, self management, education, directional preference exercise and home program  Decreased ROM/flexibility - manual therapy, therapeutic exercise, therapeutic activity and home program  Decreased strength - therapeutic exercise, therapeutic activities and home program  Impaired gait - gait training, assistive devices and home program  Impaired muscle performance - neuro re-education and home program  Decreased function - therapeutic activities and home program    Therapy Evaluation Codes:     Cumulative Therapy Evaluation is: Low complexity.    Previous and current functional limitations:  (See Goal Flow Sheet for this information)    Short term and Long term goals: (See Goal Flow Sheet for this information)     Communication ability:  Patient appears to be able to clearly communicate and understand verbal and written communication and follow directions correctly.  Treatment Explanation - The following has been discussed with the patient:   RX ordered/plan of care  Anticipated outcomes  Possible risks and side effects  This patient would benefit from PT intervention to resume normal activities.   Rehab potential is excellent.    Frequency:  1 X week, once daily progressing to 1 x every other week  Duration:  for 2-3 months  Discharge Plan:  Achieve all LTG.  Independent in home treatment program.  Reach maximal  therapeutic benefit.    Please refer to the daily flowsheet for treatment today, total treatment time and time spent performing 1:1 timed codes.

## 2023-03-15 ENCOUNTER — THERAPY VISIT (OUTPATIENT)
Dept: PHYSICAL THERAPY | Facility: CLINIC | Age: 58
End: 2023-03-15
Attending: FAMILY MEDICINE
Payer: COMMERCIAL

## 2023-03-15 DIAGNOSIS — M25.562 LEFT KNEE PAIN: Primary | ICD-10-CM

## 2023-03-15 PROCEDURE — 97140 MANUAL THERAPY 1/> REGIONS: CPT | Mod: GP

## 2023-03-15 PROCEDURE — 97110 THERAPEUTIC EXERCISES: CPT | Mod: GP

## 2023-06-28 ENCOUNTER — OFFICE VISIT (OUTPATIENT)
Dept: FAMILY MEDICINE | Facility: CLINIC | Age: 58
End: 2023-06-28
Payer: COMMERCIAL

## 2023-06-28 VITALS
HEIGHT: 63 IN | HEART RATE: 68 BPM | SYSTOLIC BLOOD PRESSURE: 110 MMHG | OXYGEN SATURATION: 98 % | DIASTOLIC BLOOD PRESSURE: 76 MMHG | BODY MASS INDEX: 38.45 KG/M2 | WEIGHT: 217 LBS | TEMPERATURE: 97.7 F

## 2023-06-28 DIAGNOSIS — Z00.00 ROUTINE GENERAL MEDICAL EXAMINATION AT A HEALTH CARE FACILITY: Primary | ICD-10-CM

## 2023-06-28 DIAGNOSIS — Z78.0 POSTMENOPAUSAL STATUS: ICD-10-CM

## 2023-06-28 DIAGNOSIS — Z12.31 ENCOUNTER FOR SCREENING MAMMOGRAM FOR BREAST CANCER: ICD-10-CM

## 2023-06-28 PROCEDURE — 99396 PREV VISIT EST AGE 40-64: CPT | Performed by: PHYSICIAN ASSISTANT

## 2023-06-28 ASSESSMENT — ENCOUNTER SYMPTOMS
HEARTBURN: 1
MYALGIAS: 0
PALPITATIONS: 0
WEAKNESS: 0
NERVOUS/ANXIOUS: 0
CHILLS: 0
JOINT SWELLING: 0
SHORTNESS OF BREATH: 0
NAUSEA: 0
DIARRHEA: 0
BREAST MASS: 0
DYSURIA: 0
SORE THROAT: 0
ABDOMINAL PAIN: 0
ARTHRALGIAS: 1
EYE PAIN: 0
HEADACHES: 0
HEMATOCHEZIA: 0
DIZZINESS: 0
CONSTIPATION: 0
HEMATURIA: 0
FREQUENCY: 0
COUGH: 0
PARESTHESIAS: 0
FEVER: 0

## 2023-06-28 NOTE — PROGRESS NOTES
SUBJECTIVE:   CC: Paulette is an 58 year old who presents for preventive health visit.       2023     6:57 AM   Additional Questions   Roomed by Emilee BURGOS   Accompanied by self         2023     6:57 AM   Patient Reported Additional Medications   Patient reports taking the following new medications none     Healthy Habits:    Getting at least 3 servings of Calcium per day:  Yes    Bi-annual eye exam:  Yes    Dental care twice a year:  Yes    Sleep apnea or symptoms of sleep apnea:  None    Diet:  Regular (no restrictions)    Frequency of exercise:  2-3 days/week    Duration of exercise:  15-30 minutes    Taking medications regularly:  Not Applicable    Medication side effects:  Other    PHQ-2 Total Score:    Additional concerns today:  No      The 10-year ASCVD risk score (Ralph SANCHEZ, et al., 2019) is: 4.6%    Values used to calculate the score:      Age: 58 years      Sex: Female      Is Non- : No      Diabetic: No      Tobacco smoker: Yes      Systolic Blood Pressure: 110 mmHg      Is BP treated: No      HDL Cholesterol: 66 mg/dL      Total Cholesterol: 203 mg/dL       Social History     Tobacco Use     Smoking status: Light Smoker     Packs/day: 0.20     Years: 21.00     Pack years: 4.20     Types: Cigarettes     Smokeless tobacco: Never     Tobacco comments:     trying to quit/cut down   Substance Use Topics     Alcohol use: No     Alcohol/week: 0.0 standard drinks of alcohol           2023     6:49 AM   Alcohol Use   Prescreen: >3 drinks/day or >7 drinks/week? No     Reviewed orders with patient.  Reviewed health maintenance and updated orders accordingly - Yes  Labs reviewed in EPIC    Breast Cancer Screenin/1/2022     7:36 AM 2022     8:06 AM   Breast CA Risk Assessment (FHS-7)   Do you have a family history of breast, colon, or ovarian cancer? No / Unknown No / Unknown         Mammogram Screening: Recommended mammography every 1-2 years with patient  "discussion and risk factor consideration  Pertinent mammograms are reviewed under the imaging tab.    History of abnormal Pap smear: NO - age 30-65 PAP every 5 years with negative HPV co-testing recommended      Latest Ref Rng & Units 7/1/2022     8:03 AM 8/4/2017    12:17 PM 8/4/2017    12:15 PM   PAP / HPV   PAP  Negative for Intraepithelial Lesion or Malignancy (NILM)      PAP (Historical)   NIL     HPV 16 DNA Negative Negative   Negative    HPV 18 DNA Negative Negative   Negative    Other HR HPV Negative Negative   Negative      Reviewed and updated as needed this visit by clinical staff    Allergies  Meds              Reviewed and updated as needed this visit by Provider                 No past medical history on file.     Review of Systems   Constitutional: Negative for chills and fever.   HENT: Negative for congestion, ear pain, hearing loss and sore throat.    Eyes: Negative for pain and visual disturbance.   Respiratory: Negative for cough and shortness of breath.    Cardiovascular: Negative for chest pain, palpitations and peripheral edema.   Gastrointestinal: Positive for heartburn. Negative for abdominal pain, constipation, diarrhea, hematochezia and nausea.   Breasts:  Negative for tenderness, breast mass and discharge.   Genitourinary: Negative for dysuria, frequency, genital sores, hematuria, pelvic pain, urgency, vaginal bleeding and vaginal discharge.   Musculoskeletal: Positive for arthralgias. Negative for joint swelling and myalgias.   Skin: Negative for rash.   Neurological: Negative for dizziness, weakness, headaches and paresthesias.   Psychiatric/Behavioral: Negative for mood changes. The patient is not nervous/anxious.         OBJECTIVE:   /76 (BP Location: Right arm, Patient Position: Chair, Cuff Size: Adult Large)   Pulse 68   Temp 97.7  F (36.5  C) (Oral)   Ht 1.588 m (5' 2.5\")   Wt 98.4 kg (217 lb)   LMP  (LMP Unknown)   SpO2 98%   BMI 39.06 kg/m    Physical Exam  GENERAL: " "healthy, alert and no distress  EYES: Eyes grossly normal to inspection  HENT: ear canals and TM's normal, nose and mouth without ulcers or lesions  NECK: no adenopathy, no asymmetry, masses, or scars and thyroid normal to palpation  RESP: lungs clear to auscultation - no rales, rhonchi or wheezes  BREAST: normal without masses, tenderness or nipple discharge and no palpable axillary masses or adenopathy  CV: regular rates and rhythm, normal S1 S2, no S3 or S4 and no murmur, click or rub  ABDOMEN: soft, nontender, no hepatosplenomegaly, no masses and bowel sounds normal  MS: no gross musculoskeletal defects noted, no edema  SKIN: no suspicious lesions or rashes  NEURO: Normal strength and tone, mentation intact and speech normal  PSYCH: mentation appears normal, affect normal/bright      ASSESSMENT/PLAN:       ICD-10-CM    1. Routine general medical examination at a health care facility  Z00.00 Lipid panel reflex to direct LDL Fasting     Glucose     TSH with free T4 reflex      2. Postmenopausal status  Z78.0 DX Hip/Pelvis/Spine      3. Encounter for screening mammogram for breast cancer  Z12.31 *MA Screening Digital Bilateral          Screenings discussed    COUNSELING:  Reviewed preventive health counseling, as reflected in patient instructions      BMI:   Estimated body mass index is 39.06 kg/m  as calculated from the following:    Height as of this encounter: 1.588 m (5' 2.5\").    Weight as of this encounter: 98.4 kg (217 lb).     She reports that she has been smoking cigarettes. She has a 4.20 pack-year smoking history. She has never used smokeless tobacco.      Laura Sarah PA-C  [facility]  "

## 2023-06-28 NOTE — PATIENT INSTRUCTIONS
Calcium: 600mg once or twice daily      Preventive Health Recommendations  Female Ages 50 - 64    Yearly exam: See your health care provider every year in order to  Review health changes.   Discuss preventive care.    Review your medicines if your doctor has prescribed any.    Get a Pap test every three years (unless you have an abnormal result and your provider advises testing more often).  If you get Pap tests with HPV test, you only need to test every 5 years, unless you have an abnormal result.   You do not need a Pap test if your uterus was removed (hysterectomy) and you have not had cancer.  You should be tested each year for STDs (sexually transmitted diseases) if you're at risk.   Have a mammogram every 1 to 2 years.  Have a colonoscopy at age 50, or have a yearly FIT test (stool test). These exams screen for colon cancer.    Have a cholesterol test every 5 years, or more often if advised.  Have a diabetes test (fasting glucose) every three years. If you are at risk for diabetes, you should have this test more often.   If you are at risk for osteoporosis (brittle bone disease), think about having a bone density scan (DEXA).    Shots: Get a flu shot each year. Get a tetanus shot every 10 years.    Nutrition:   Eat at least 5 servings of fruits and vegetables each day.  Eat whole-grain bread, whole-wheat pasta and brown rice instead of white grains and rice.  Get adequate Calcium and Vitamin D.     Lifestyle  Exercise at least 150 minutes a week (30 minutes a day, 5 days a week). This will help you control your weight and prevent disease.  Limit alcohol to one drink per day.  No smoking.   Wear sunscreen to prevent skin cancer.   See your dentist every six months for an exam and cleaning.  See your eye doctor every 1 to 2 years.

## 2023-07-28 ENCOUNTER — ANCILLARY PROCEDURE (OUTPATIENT)
Dept: BONE DENSITY | Facility: CLINIC | Age: 58
End: 2023-07-28
Attending: PHYSICIAN ASSISTANT
Payer: COMMERCIAL

## 2023-07-28 ENCOUNTER — ANCILLARY PROCEDURE (OUTPATIENT)
Dept: MAMMOGRAPHY | Facility: CLINIC | Age: 58
End: 2023-07-28
Attending: PHYSICIAN ASSISTANT
Payer: COMMERCIAL

## 2023-07-28 DIAGNOSIS — Z78.0 POSTMENOPAUSAL STATUS: ICD-10-CM

## 2023-07-28 DIAGNOSIS — Z12.31 ENCOUNTER FOR SCREENING MAMMOGRAM FOR BREAST CANCER: ICD-10-CM

## 2023-07-28 PROCEDURE — 77080 DXA BONE DENSITY AXIAL: CPT | Performed by: INTERNAL MEDICINE

## 2023-07-28 PROCEDURE — 77067 SCR MAMMO BI INCL CAD: CPT | Mod: TC | Performed by: RADIOLOGY

## 2023-07-28 PROCEDURE — 77063 BREAST TOMOSYNTHESIS BI: CPT | Mod: TC | Performed by: RADIOLOGY

## 2023-08-18 ENCOUNTER — DOCUMENTATION ONLY (OUTPATIENT)
Dept: OTHER | Facility: CLINIC | Age: 58
End: 2023-08-18
Payer: COMMERCIAL

## 2023-09-18 NOTE — PROGRESS NOTES
Paulette Flores  :  1965  DOS: 23  MRN: [id number]    Sports Medicine Clinic Visit    PCP: Laura Sarah    Paulette Flores is a 57 year old female who is seen as a WALK IN patient presenting with acute left knee pain.    Injury: Patient describes injury as she was snow blowing and noticed anterior knee pain, she mann snot recall falling or twisting the knee, pain began approximately 13 day(s) ago.  Pain located over left anterior aspect of her knee, nonradiating.  Additional Features:  Positive: swelling.  Symptoms are better with Ice, Ibuprofen and Elevation.  Symptoms are worse with: extension, flexion and walking.  Other evaluation and/or treatments so far consists of: Nothing.  Recent imaging completed: No recent imaging completed.  Prior History of related problems: none    Social History:  Patient works for the [employer]    Interim History - 2023  Since last visit on 3/8/23 patient has a return of pain in the left knee.  No interim injury.     Thinks she twisted about 3 weeks ago, unknown injury, nothing stands out - started HEP for the knee which has helped, improving over time.  Was doing PT originally which helped heal her knee from prior irritation/injury   She can feel fluid on the knee, feels tight, also improving   Medial and anterior knee pain.  Has used OTC medications when needed.    Review of Systems  Musculoskeletal: as above  Remainder of review of systems is negative including constitutional, CV, pulmonary, GI, Skin and Neurologic except as noted in HPI or medical history.    No past medical history on file.  Past Surgical History:   Procedure Laterality Date    COLONOSCOPY N/A 2016    Procedure: COLONOSCOPY;  Surgeon: Drake Evans MD;  Location: [facility]    ENT SURGERY      has had 6 ear surgeries and 1 nose surgery     Family History   Problem Relation Age of Onset    Hypertension Mother     Hypothyroidism Mother     Hypertension Father      Graves' disease Sister     Hypothyroidism Sister     Coronary Artery Disease Maternal Grandmother         MI    Hypertension Maternal Grandmother     Cerebrovascular Disease Maternal Grandfather     Diabetes Maternal Grandfather     Asthma Paternal Grandmother     Cerebrovascular Disease Paternal Grandfather     Breast Cancer No family hx of     Colon Cancer No family hx of        Objective  /79   Pulse 76   Wt 98.4 kg (217 lb)   LMP  (LMP Unknown)   BMI 39.06 kg/m      General: healthy, alert and in no distress    HEENT: no scleral icterus or conjunctival erythema   Skin: no suspicious lesions or rash. No jaundice.   CV: regular rhythm by palpation, 2+ distal pulses, no pedal edema    Resp: normal respiratory effort without conversational dyspnea   Psych: normal mood and affect    Gait: moderately antalgic, appropriate coordination and balance   Neuro: normal light touch sensory exam of the extremities. Motor strength as noted below     Left Knee exam    ROM:        Full active and passive ROM with flexion and extension       Mild pain in terminal flexion    Inspection:       no visible ecchymosis        effusion noted trace by palpation    Skin:       no visible deformities       well perfused       capillary refill brisk    Patellar Motion:        Normal patellar tracking noted through range of motion       Crepitus noted in the patellofemoral joint    Tender:        Lateral > medial patellar border, mild       medial joint line mild/moderate       lateral joint line very mild    Non Tender:         remainder of knee area    Special Tests:        neg (-) Cordell       neg (-) Lachman       neg (-) anterior drawer       neg (-) posterior drawer       neg (-) varus at 0 deg and 30 deg       neg (-) valgus at 0 deg and 30 deg       no pain with forced extension      Mild pain with PF compression    Evaluation of ipsilateral kinetic chain       normal strength with hip extension and  abduction      Radiology  XR KNEE STANDING AP SUNRISE BILAT LAT LEFT   9/20/2023 2:09 PM      HISTORY: Left knee pain  COMPARISON: None.                                                                       IMPRESSION:      LEFT KNEE: No acute fracture or dislocation. There is mild to moderate  medial and mild patellofemoral compartment degenerative arthrosis.  There is a suspected small joint effusion.     RIGHT KNEE: Frontal and sunrise views of the right knee demonstrate  mild medial and patellofemoral compartment degenerative arthrosis.    Assessment:  1. Acute pain of left knee    2. Primary osteoarthritis of left knee          Plan:  Discussed the assessment with the patient.  Follow up: prn based on progress  Earlier episode this year, knee pain after snowblowing driveway, no mechanical sx, no signs of instability on exam, predominantly WB joint pain, some pain in medial PF compartment as well  Exam similar today but not as significant in terms of pain, overall improving after recent flare  HEP has helped, reviewed option to return to PT if needed for HEP fine-tuning and progression  Reviewed low impact and core building strategies  Suspected previously that there was underlying DJD with active flare, this is confirmed today with XR  XR images independently visualized and reviewed with patient today in clinic  Reviewed option for scheduled tylenol and topical voltaren gel until f/u which would have a better safety profile  Reviewed option for US guided CSI +/- aspiration for labile sx, defer at this time  RICE reviewed  Assistive device options reviewed  We discussed modified progressive pain-free activity as tolerated  Home handouts provided and supportive care reviewed  All questions were answered today  Contact us with additional questions or concerns  Signs and sx of concern reviewed      Matias Mckee DO, TERESSA  Sports Medicine Physician  [facility]              Disclaimer: This note consists of symbols derived from keyboarding, dictation and/or voice recognition software. As a result, there may be errors in the script that have gone undetected. Please consider this when interpreting information found in this chart.

## 2023-09-20 ENCOUNTER — OFFICE VISIT (OUTPATIENT)
Dept: ORTHOPEDICS | Facility: CLINIC | Age: 58
End: 2023-09-20
Payer: COMMERCIAL

## 2023-09-20 ENCOUNTER — ANCILLARY PROCEDURE (OUTPATIENT)
Dept: GENERAL RADIOLOGY | Facility: CLINIC | Age: 58
End: 2023-09-20
Attending: FAMILY MEDICINE
Payer: COMMERCIAL

## 2023-09-20 VITALS
DIASTOLIC BLOOD PRESSURE: 79 MMHG | BODY MASS INDEX: 39.06 KG/M2 | WEIGHT: 217 LBS | HEART RATE: 76 BPM | SYSTOLIC BLOOD PRESSURE: 120 MMHG

## 2023-09-20 DIAGNOSIS — M25.562 LEFT KNEE PAIN: ICD-10-CM

## 2023-09-20 DIAGNOSIS — M17.12 PRIMARY OSTEOARTHRITIS OF LEFT KNEE: ICD-10-CM

## 2023-09-20 DIAGNOSIS — M25.562 ACUTE PAIN OF LEFT KNEE: Primary | ICD-10-CM

## 2023-09-20 PROCEDURE — 99214 OFFICE O/P EST MOD 30 MIN: CPT | Performed by: FAMILY MEDICINE

## 2023-09-20 PROCEDURE — 73562 X-RAY EXAM OF KNEE 3: CPT | Mod: TC | Performed by: RADIOLOGY

## 2023-09-20 NOTE — LETTER
2023         RE: Paulette Flores  [address]  [place] [address]        Dear Colleague,    Thank you for referring your patient, Paulette Flores, to the [facility]. Please see a copy of my visit note below.    Paulette Flores  :  1965  DOS: 23  MRN: [id number]    Sports Medicine Clinic Visit    PCP: Laura Sarah    Paulette Flores is a 57 year old female who is seen as a WALK IN patient presenting with acute left knee pain.    Injury: Patient describes injury as she was snow blowing and noticed anterior knee pain, she mann snot recall falling or twisting the knee, pain began approximately 13 day(s) ago.  Pain located over left anterior aspect of her knee, nonradiating.  Additional Features:  Positive: swelling.  Symptoms are better with Ice, Ibuprofen and Elevation.  Symptoms are worse with: extension, flexion and walking.  Other evaluation and/or treatments so far consists of: Nothing.  Recent imaging completed: No recent imaging completed.  Prior History of related problems: none    Social History:  Patient works for the [place] [employer]    Interim History - 2023  Since last visit on 3/8/23 patient has a return of pain in the left knee.  No interim injury.     Thinks she twisted about 3 weeks ago, unknown injury, nothing stands out - started HEP for the knee which has helped, improving over time.  Was doing PT originally which helped heal her knee from prior irritation/injury   She can feel fluid on the knee, feels tight, also improving   Medial and anterior knee pain.  Has used OTC medications when needed.    Review of Systems  Musculoskeletal: as above  Remainder of review of systems is negative including constitutional, CV, pulmonary, GI, Skin and Neurologic except as noted in HPI or medical history.    No past medical history on file.  Past Surgical History:   Procedure Laterality Date     COLONOSCOPY N/A 2016     Procedure: COLONOSCOPY;  Surgeon: Drake Evans MD;  Location: [facility]     ENT SURGERY  2000    has had 6 ear surgeries and 1 nose surgery     Family History   Problem Relation Age of Onset     Hypertension Mother      Hypothyroidism Mother      Hypertension Father      Graves' disease Sister      Hypothyroidism Sister      Coronary Artery Disease Maternal Grandmother         MI     Hypertension Maternal Grandmother      Cerebrovascular Disease Maternal Grandfather      Diabetes Maternal Grandfather      Asthma Paternal Grandmother      Cerebrovascular Disease Paternal Grandfather      Breast Cancer No family hx of      Colon Cancer No family hx of        Objective  /79   Pulse 76   Wt 98.4 kg (217 lb)   LMP  (LMP Unknown)   BMI 39.06 kg/m      General: healthy, alert and in no distress    HEENT: no scleral icterus or conjunctival erythema   Skin: no suspicious lesions or rash. No jaundice.   CV: regular rhythm by palpation, 2+ distal pulses, no pedal edema    Resp: normal respiratory effort without conversational dyspnea   Psych: normal mood and affect    Gait: moderately antalgic, appropriate coordination and balance   Neuro: normal light touch sensory exam of the extremities. Motor strength as noted below     Left Knee exam    ROM:        Full active and passive ROM with flexion and extension       Mild pain in terminal flexion    Inspection:       no visible ecchymosis        effusion noted trace by palpation    Skin:       no visible deformities       well perfused       capillary refill brisk    Patellar Motion:        Normal patellar tracking noted through range of motion       Crepitus noted in the patellofemoral joint    Tender:        Lateral > medial patellar border, mild       medial joint line mild/moderate       lateral joint line very mild    Non Tender:         remainder of knee area    Special Tests:        neg (-) Cordell       neg (-) Lachman       neg (-) anterior drawer       neg (-)  posterior drawer       neg (-) varus at 0 deg and 30 deg       neg (-) valgus at 0 deg and 30 deg       no pain with forced extension      Mild pain with PF compression    Evaluation of ipsilateral kinetic chain       normal strength with hip extension and abduction      Radiology  XR KNEE STANDING AP SUNRISE BILAT LAT LEFT   9/20/2023 2:09 PM      HISTORY: Left knee pain  COMPARISON: None.                                                                       IMPRESSION:      LEFT KNEE: No acute fracture or dislocation. There is mild to moderate  medial and mild patellofemoral compartment degenerative arthrosis.  There is a suspected small joint effusion.     RIGHT KNEE: Frontal and sunrise views of the right knee demonstrate  mild medial and patellofemoral compartment degenerative arthrosis.    Assessment:  1. Acute pain of left knee    2. Primary osteoarthritis of left knee          Plan:  Discussed the assessment with the patient.  Follow up: prn based on progress  Earlier episode this year, knee pain after snowblowing driveway, no mechanical sx, no signs of instability on exam, predominantly WB joint pain, some pain in medial PF compartment as well  Exam similar today but not as significant in terms of pain, overall improving after recent flare  HEP has helped, reviewed option to return to PT if needed for HEP fine-tuning and progression  Reviewed low impact and core building strategies  Suspected previously that there was underlying DJD with active flare, this is confirmed today with XR  XR images independently visualized and reviewed with patient today in clinic  Reviewed option for scheduled tylenol and topical voltaren gel until f/u which would have a better safety profile  Reviewed option for US guided CSI +/- aspiration for labile sx, defer at this time  RICE reviewed  Assistive device options reviewed  We discussed modified progressive pain-free activity as tolerated  Home handouts provided and supportive  care reviewed  All questions were answered today  Contact us with additional questions or concerns  Signs and sx of concern reviewed      Matias Mckee DO, TERESSA  Sports Medicine Physician  [facility]              Disclaimer: This note consists of symbols derived from keyboarding, dictation and/or voice recognition software. As a result, there may be errors in the script that have gone undetected. Please consider this when interpreting information found in this chart.      Again, thank you for allowing me to participate in the care of your patient.        Sincerely,        Matias Mckee DO

## 2023-11-07 NOTE — PROGRESS NOTES
Chief Complaint   Patient presents with    Cerumen Impaction     Needs ears cleaned     History of Present Illness  Paulette Flores is a 58 year old female who presents today for follow-up.  I have seen the patient previously for ear and sinus concerns.  She was last seen in February 2022 with acute onset right ear discomfort.  From the patient's history and physical exam, I felt that her ear discomfort was most related to TMJ issues.  She returns today for follow-up and ear cleaning.     From a symptom standpoint, the patient reports that overall her ears are stable. The patient denies significant hearing changes.  She has had multiple surgeries for what sounds like was patulous eustachian tube initially.  She did developed middle ear pathology and what sounds like retraction or tympanic membrane perforation.  She states has had 6 surgeries in her left ear.  The most recent surgery was over 15 years ago. She denies any otorrhea, bloody otorrhea, decreased hearing. No dizziness or vertigo. No significant tinnitus. She has had orthodontia twice previously, most recently she had Invisaline.      The patient underwent an audiogram performed 11/19/2021.  My review of the audiogram showed stable hearing from her June 2020 audiogram.  The patient has normal hearing in the right ear and mild sloping to severe conductive hearing loss in the left ear.  Pure-tone average was 8 dB on the right and 32 dB on the left.  Speech reception threshold was 5 dB on the right and 30 dB on the left.  The patient had 100% word recognition on the right and 96 % word recognition on the left.  The patient had a type A tympanogram on the right and a type A tympanogram on the left.    Past Medical History  Patient Active Problem List   Diagnosis    Conductive hearing loss in left ear    DDD (degenerative disc disease), lumbar    Decreased GFR    Chronic midline thoracic back pain    Family history of hypothyroidism    Left knee pain     Current  Medications  No current outpatient medications on file.    Allergies  Allergies   Allergen Reactions    Percocet [Oxycodone-Acetaminophen] Difficulty breathing     Throat closed    Vicodin [Hydrocodone-Acetaminophen] Nausea and Vomiting       Social History  Social History     Socioeconomic History    Marital status:    Tobacco Use    Smoking status: Light Smoker     Packs/day: 0.20     Years: 21.00     Additional pack years: 0.00     Total pack years: 4.20     Types: Cigarettes    Smokeless tobacco: Never    Tobacco comments:     trying to quit/cut down   Vaping Use    Vaping Use: Never used   Substance and Sexual Activity    Alcohol use: No     Alcohol/week: 0.0 standard drinks of alcohol    Drug use: No    Sexual activity: Yes     Partners: Male   Social History Narrative    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28    Grandchildren       Family History  Family History   Problem Relation Age of Onset    Hypertension Mother     Hypothyroidism Mother     Hypertension Father     Graves' disease Sister     Hypothyroidism Sister     Coronary Artery Disease Maternal Grandmother         MI    Hypertension Maternal Grandmother     Cerebrovascular Disease Maternal Grandfather     Diabetes Maternal Grandfather     Asthma Paternal Grandmother     Cerebrovascular Disease Paternal Grandfather     Breast Cancer No family hx of     Colon Cancer No family hx of        Review of Systems  As per HPI and PMHx, otherwise 10 system review including the head and neck, constitutional, eyes, respiratory, GI, skin, neurologic, lymphatic, endocrine, and allergy systems is negative.    Physical Exam  /80 (BP Location: Left arm, Patient Position: Chair, Cuff Size: Adult Large)   Pulse 70   Temp 98.2  F (36.8  C) (Tympanic)   Wt 90.7 kg (200 lb)   LMP  (LMP Unknown)   BMI 36.00 kg/m    GENERAL: Patient is a pleasant, cooperative 58 year old female in no acute distress.  HEAD: Normocephalic, atraumatic.  Hair and  scalp are normal.  EYES: Pupils are equal, round, reactive to light and accommodation.  Extraocular movements are intact.  The sclera nonicteric without injection.  The extraocular structures are normal.  EARS: See below.  NOSE: Nares are patent. Nasal mucosa is pink and moist. Negative anterior rhinoscopy.  ORAL CAVITY: Mucous membranes are moist. Dentition is in good repair. Palpation of the bilateral temporomandibular joints reveals significant tenderness over the right TMJ. There is some crepitus and late clicking noted bilaterally.  NEUROLOGIC: Cranial nerves II through XII are grossly intact.  Voice is strong. Patient is House-Brackmann I/VI bilaterally.   CARDIOVASCULAR: Extremities are warm and well-perfused.  No significant peripheral edema.  RESPIRATORY: Patient has nonlabored breathing without cough, wheeze, stridor.  PSYCHIATRIC: Patient is alert and oriented.  Mood and affect appear normal.  SKIN: Warm and dry.  No scalp, face, or neck lesions noted.     Physical Exam and Procedure     EARS: Normal shape and symmetry.  No tenderness when palpating the mastoid or tragal areas bilaterally.  The ears were then examined under the otic binocular microscope to perform cerumen removal.  Otoscopic exam on the right reveals impacted cerumen down to the level of the tympanic membrane.  The cerumen was removed with a curette.  The right tympanic membrane was round, intact without evidence of effusion.  No retraction, granulation, drainage, or evidence of cholesteatoma.       Attention was turned to the left ear.  Otoscopic exam on the left reveals impacted cerumen down to the level of tympanic membrane.  The cerumen was cleaned with a curette.  The patient's left tympanic membrane is intact.  Her landmarks are bit atypical given her previous ear surgeries.  There is a little bit of respiratory variation to her tympanic membrane.  There is no evidence of middle ear effusion, no retraction, granulation, drainage, or  evidence of cholesteatoma.       Assessment and Plan     ICD-10-CM    1. History of ear surgery  Z98.890 Remove Cerumen      2. Conductive hearing loss of left ear with unrestricted hearing of right ear  H90.12 Remove Cerumen      3. Temporal mandibular joint disorder  M26.609 Remove Cerumen      4. Bilateral impacted cerumen  H61.23 Remove Cerumen      5. Tobacco dependence syndrome  F17.200       6. Encounter for tobacco use cessation counseling  Z71.6          It was my pleasure seeing Paulette Flores today in clinic. The patient had cerumen cleaned from both ears today in office.  Her ear exam and ear symptoms are stable.  At this point time, I would recommend observation.  The patient can return to clinic in the future as needed with any problems or concerns.    James Villanueva MD  Department of Otolaryngology-Head and Neck Surgery  [facility]

## 2023-11-08 ENCOUNTER — OFFICE VISIT (OUTPATIENT)
Dept: OTOLARYNGOLOGY | Facility: CLINIC | Age: 58
End: 2023-11-08
Payer: COMMERCIAL

## 2023-11-08 VITALS
HEART RATE: 70 BPM | WEIGHT: 200 LBS | TEMPERATURE: 98.2 F | BODY MASS INDEX: 36 KG/M2 | DIASTOLIC BLOOD PRESSURE: 80 MMHG | SYSTOLIC BLOOD PRESSURE: 138 MMHG

## 2023-11-08 DIAGNOSIS — H90.12 CONDUCTIVE HEARING LOSS OF LEFT EAR WITH UNRESTRICTED HEARING OF RIGHT EAR: ICD-10-CM

## 2023-11-08 DIAGNOSIS — M26.609 TEMPORAL MANDIBULAR JOINT DISORDER: ICD-10-CM

## 2023-11-08 DIAGNOSIS — H61.23 BILATERAL IMPACTED CERUMEN: ICD-10-CM

## 2023-11-08 DIAGNOSIS — Z71.6 ENCOUNTER FOR TOBACCO USE CESSATION COUNSELING: ICD-10-CM

## 2023-11-08 DIAGNOSIS — Z98.890 HISTORY OF EAR SURGERY: Primary | ICD-10-CM

## 2023-11-08 DIAGNOSIS — F17.200 TOBACCO DEPENDENCE SYNDROME: ICD-10-CM

## 2023-11-08 PROCEDURE — 69210 REMOVE IMPACTED EAR WAX UNI: CPT | Mod: 50 | Performed by: OTOLARYNGOLOGY

## 2023-11-08 ASSESSMENT — PAIN SCALES - GENERAL: PAINLEVEL: NO PAIN (0)

## 2023-11-08 NOTE — LETTER
11/8/2023         RE: Paulette Flores  [address]  [place] [address]        Dear Colleague,    Thank you for referring your patient, Paulette Flores, to the [facility]. Please see a copy of my visit note below.    Chief Complaint   Patient presents with     Cerumen Impaction     Needs ears cleaned     History of Present Illness  Paulette Flores is a 58 year old female who presents today for follow-up.  I have seen the patient previously for ear and sinus concerns.  She was last seen in February 2022 with acute onset right ear discomfort.  From the patient's history and physical exam, I felt that her ear discomfort was most related to TMJ issues.  She returns today for follow-up and ear cleaning.     From a symptom standpoint, the patient reports that overall her ears are stable. The patient denies significant hearing changes.  She has had multiple surgeries for what sounds like was patulous eustachian tube initially.  She did developed middle ear pathology and what sounds like retraction or tympanic membrane perforation.  She states has had 6 surgeries in her left ear.  The most recent surgery was over 15 years ago. She denies any otorrhea, bloody otorrhea, decreased hearing. No dizziness or vertigo. No significant tinnitus. She has had orthodontia twice previously, most recently she had Invisaline.      The patient underwent an audiogram performed 11/19/2021.  My review of the audiogram showed stable hearing from her June 2020 audiogram.  The patient has normal hearing in the right ear and mild sloping to severe conductive hearing loss in the left ear.  Pure-tone average was 8 dB on the right and 32 dB on the left.  Speech reception threshold was 5 dB on the right and 30 dB on the left.  The patient had 100% word recognition on the right and 96 % word recognition on the left.  The patient had a type A tympanogram on the right and a type A tympanogram on the left.    Past Medical  History  Patient Active Problem List   Diagnosis     Conductive hearing loss in left ear     DDD (degenerative disc disease), lumbar     Decreased GFR     Chronic midline thoracic back pain     Family history of hypothyroidism     Left knee pain     Current Medications  No current outpatient medications on file.    Allergies  Allergies   Allergen Reactions     Percocet [Oxycodone-Acetaminophen] Difficulty breathing     Throat closed     Vicodin [Hydrocodone-Acetaminophen] Nausea and Vomiting       Social History  Social History     Socioeconomic History     Marital status:    Tobacco Use     Smoking status: Light Smoker     Packs/day: 0.20     Years: 21.00     Additional pack years: 0.00     Total pack years: 4.20     Types: Cigarettes     Smokeless tobacco: Never     Tobacco comments:     trying to quit/cut down   Vaping Use     Vaping Use: Never used   Substance and Sexual Activity     Alcohol use: No     Alcohol/week: 0.0 standard drinks of alcohol     Drug use: No     Sexual activity: Yes     Partners: Male   Social History Narrative    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28    Grandchildren       Family History  Family History   Problem Relation Age of Onset     Hypertension Mother      Hypothyroidism Mother      Hypertension Father      Graves' disease Sister      Hypothyroidism Sister      Coronary Artery Disease Maternal Grandmother         MI     Hypertension Maternal Grandmother      Cerebrovascular Disease Maternal Grandfather      Diabetes Maternal Grandfather      Asthma Paternal Grandmother      Cerebrovascular Disease Paternal Grandfather      Breast Cancer No family hx of      Colon Cancer No family hx of        Review of Systems  As per HPI and PMHx, otherwise 10 system review including the head and neck, constitutional, eyes, respiratory, GI, skin, neurologic, lymphatic, endocrine, and allergy systems is negative.    Physical Exam  /80 (BP Location: Left arm, Patient  Position: Chair, Cuff Size: Adult Large)   Pulse 70   Temp 98.2  F (36.8  C) (Tympanic)   Wt 90.7 kg (200 lb)   LMP  (LMP Unknown)   BMI 36.00 kg/m    GENERAL: Patient is a pleasant, cooperative 58 year old female in no acute distress.  HEAD: Normocephalic, atraumatic.  Hair and scalp are normal.  EYES: Pupils are equal, round, reactive to light and accommodation.  Extraocular movements are intact.  The sclera nonicteric without injection.  The extraocular structures are normal.  EARS: See below.  NOSE: Nares are patent. Nasal mucosa is pink and moist. Negative anterior rhinoscopy.  ORAL CAVITY: Mucous membranes are moist. Dentition is in good repair. Palpation of the bilateral temporomandibular joints reveals significant tenderness over the right TMJ. There is some crepitus and late clicking noted bilaterally.  NEUROLOGIC: Cranial nerves II through XII are grossly intact.  Voice is strong. Patient is House-Brackmann I/VI bilaterally.   CARDIOVASCULAR: Extremities are warm and well-perfused.  No significant peripheral edema.  RESPIRATORY: Patient has nonlabored breathing without cough, wheeze, stridor.  PSYCHIATRIC: Patient is alert and oriented.  Mood and affect appear normal.  SKIN: Warm and dry.  No scalp, face, or neck lesions noted.     Physical Exam and Procedure     EARS: Normal shape and symmetry.  No tenderness when palpating the mastoid or tragal areas bilaterally.  The ears were then examined under the otic binocular microscope to perform cerumen removal.  Otoscopic exam on the right reveals impacted cerumen down to the level of the tympanic membrane.  The cerumen was removed with a curette.  The right tympanic membrane was round, intact without evidence of effusion.  No retraction, granulation, drainage, or evidence of cholesteatoma.       Attention was turned to the left ear.  Otoscopic exam on the left reveals impacted cerumen down to the level of tympanic membrane.  The cerumen was cleaned with a  curette.  The patient's left tympanic membrane is intact.  Her landmarks are bit atypical given her previous ear surgeries.  There is a little bit of respiratory variation to her tympanic membrane.  There is no evidence of middle ear effusion, no retraction, granulation, drainage, or evidence of cholesteatoma.       Assessment and Plan     ICD-10-CM    1. History of ear surgery  Z98.890 Remove Cerumen      2. Conductive hearing loss of left ear with unrestricted hearing of right ear  H90.12 Remove Cerumen      3. Temporal mandibular joint disorder  M26.609 Remove Cerumen      4. Bilateral impacted cerumen  H61.23 Remove Cerumen      5. Tobacco dependence syndrome  F17.200       6. Encounter for tobacco use cessation counseling  Z71.6          It was my pleasure seeing Paulette Flores today in clinic. The patient had cerumen cleaned from both ears today in office.  Her ear exam and ear symptoms are stable.  At this point time, I would recommend observation.  The patient can return to clinic in the future as needed with any problems or concerns.    James Villanueva MD  Department of Otolaryngology-Head and Neck Surgery  [facility]       Again, thank you for allowing me to participate in the care of your patient.        Sincerely,        James Villanueva MD

## 2023-11-08 NOTE — NURSING NOTE
"Initial /80 (BP Location: Left arm, Patient Position: Chair, Cuff Size: Adult Large)   Pulse 70   Temp 98.2  F (36.8  C) (Tympanic)   Wt 90.7 kg (200 lb)   LMP  (LMP Unknown)   BMI 36.00 kg/m   Estimated body mass index is 36 kg/m  as calculated from the following:    Height as of 6/28/23: 1.588 m (5' 2.5\").    Weight as of this encounter: 90.7 kg (200 lb). .  Asuncion Bello LPN    "

## 2023-12-13 ENCOUNTER — OFFICE VISIT (OUTPATIENT)
Dept: URGENT CARE | Facility: URGENT CARE | Age: 58
End: 2023-12-13
Payer: COMMERCIAL

## 2023-12-13 VITALS
WEIGHT: 219.8 LBS | HEART RATE: 83 BPM | BODY MASS INDEX: 39.56 KG/M2 | TEMPERATURE: 98.3 F | DIASTOLIC BLOOD PRESSURE: 81 MMHG | RESPIRATION RATE: 32 BRPM | SYSTOLIC BLOOD PRESSURE: 136 MMHG | OXYGEN SATURATION: 94 %

## 2023-12-13 DIAGNOSIS — J02.9 SORE THROAT: ICD-10-CM

## 2023-12-13 DIAGNOSIS — J06.9 VIRAL UPPER RESPIRATORY TRACT INFECTION WITH COUGH: Primary | ICD-10-CM

## 2023-12-13 LAB
DEPRECATED S PYO AG THROAT QL EIA: NEGATIVE
FLUAV AG SPEC QL IA: NEGATIVE
FLUBV AG SPEC QL IA: NEGATIVE
GROUP A STREP BY PCR: NOT DETECTED

## 2023-12-13 PROCEDURE — 87804 INFLUENZA ASSAY W/OPTIC: CPT | Performed by: PHYSICIAN ASSISTANT

## 2023-12-13 PROCEDURE — 87651 STREP A DNA AMP PROBE: CPT | Performed by: PHYSICIAN ASSISTANT

## 2023-12-13 PROCEDURE — 99213 OFFICE O/P EST LOW 20 MIN: CPT | Performed by: PHYSICIAN ASSISTANT

## 2023-12-13 RX ORDER — ALBUTEROL SULFATE 90 UG/1
1-2 AEROSOL, METERED RESPIRATORY (INHALATION) EVERY 4 HOURS PRN
COMMUNITY
Start: 2023-12-11 | End: 2024-08-08

## 2023-12-13 RX ORDER — BENZONATATE 100 MG/1
100 CAPSULE ORAL 3 TIMES DAILY PRN
COMMUNITY
Start: 2023-12-11 | End: 2024-08-08

## 2023-12-13 ASSESSMENT — ENCOUNTER SYMPTOMS
JOINT SWELLING: 0
CARDIOVASCULAR NEGATIVE: 1
DIARRHEA: 0
VOMITING: 0
ARTHRALGIAS: 0
NECK PAIN: 0
ENDOCRINE NEGATIVE: 1
RHINORRHEA: 0
MUSCULOSKELETAL NEGATIVE: 1
MYALGIAS: 0
EYES NEGATIVE: 1
COUGH: 1
WEAKNESS: 0
ALLERGIC/IMMUNOLOGIC NEGATIVE: 1
FEVER: 0
HEADACHES: 0
DIZZINESS: 0
CHILLS: 0
NECK STIFFNESS: 0
PALPITATIONS: 0
LIGHT-HEADEDNESS: 0
SORE THROAT: 0
WOUND: 0
SHORTNESS OF BREATH: 0
NAUSEA: 0
BACK PAIN: 0

## 2023-12-13 NOTE — LETTER
8/6/2020         RE: Paulette Flores  [address]  [place] [address]        Dear Colleague,    Thank you for referring your patient, Paulette Flores, to the [facility]. Please see a copy of my visit note below.    HPI:   Chief complaints: Paulette Flores is a 55 year old female who presents for treatment of nasal telangiectasias and angioma on the cheek with IPL      Review Of Systems  Eyes: negative  Ears/Nose/Throat: negative  Respiratory: No shortness of breath, dyspnea on exertion, cough, or hemoptysis  Cardiovascular: negative  Gastrointestinal: negative  Genitourinary: negative  Musculoskeletal: negative  Neurologic: negative  Psychiatric: negative  Skin: positive for vessels      PHYSICAL EXAM:    /76   Pulse 76   SpO2 96%   Skin exam performed as follows: Type 2 skin. Mood appropriate  Alert and Oriented X 3. Well developed, well nourished in no distress.  General appearance: Normal  Head including face: Normal  Eyes: conjunctiva and lids: Normal  Mouth: Lips, teeth, gums: Normal  Neck: Normal  Chest-breast/axillae: Normal  Back: Normal  Spleen and liver: Normal  Cardiovascular: Exam of peripheral vascular system by observation for swelling, varicosities, edema: Normal  Genitalia: groin, buttocks: Normal  Extremities: digits/nails (clubbing): Normal  Eccrine and Apocrine glands: Normal  Right upper extremity: Normal  Left upper extremity: Normal  Right lower extremity: Normal  Left lower extremity: Normal  Skin: Scalp and body hair: See below    1. Telangiectasias around ala bilaterally; angioma on the right cheek    ASSESSMENT/PLAN:     1. Facial telangiectasias and angioma - IPL laser performed to face. 560nm at 18-22 mJ.  Approximately 5 passes for angioma; 1-2 for nose. Patient tolerated well with no complications. Warned to be diligent with SPF for at least the next 2 months to avoid dyspigmentation. Allow 3-4 weeks for full effect; follow up in any residual areas. $250  cosmetic charge.   2. Paulette Flores to follow up with Primary Care provider regarding elevated blood pressure.        Follow-up: 1 month  CC:   Scribed By: Inocencia Davis, MS, PAREGAN        Again, thank you for allowing me to participate in the care of your patient.        Sincerely,        Inocencia Davis PA-C     Yes

## 2023-12-13 NOTE — PROGRESS NOTES
Chief Complaint:     Chief Complaint   Patient presents with    Cough     Cough, right ear pain, sore throat, congestion for the last week. Negative rapid covid at home       Results for orders placed or performed in visit on 12/13/23   Streptococcus A Rapid Screen w/Reflex to PCR - Clinic Collect     Status: Normal    Specimen: Throat; Swab   Result Value Ref Range    Group A Strep antigen Negative Negative   Influenza A & B Antigen - Clinic Collect     Status: Normal    Specimen: Nose; Swab   Result Value Ref Range    Influenza A antigen Negative Negative    Influenza B antigen Negative Negative    Narrative    Test results must be correlated with clinical data. If necessary, results should be confirmed by a molecular assay or viral culture.       Medical Decision Making:    Vital signs reviewed by Reyes Sheffield PA-C  /81   Pulse 83   Temp 98.3  F (36.8  C) (Tympanic)   Resp (!) 32   Wt 99.7 kg (219 lb 12.8 oz)   LMP  (LMP Unknown)   SpO2 94%   BMI 39.56 kg/m      Differential Diagnosis:  URI Adult/Peds:  Acute right otitis media, Bronchitis-viral, Influenza, Pneumonia, Sinusitis, Strep pharyngitis, Tonsilitis, Viral pharyngitis, Viral syndrome, and Viral upper respiratory illness        ASSESSMENT    1. Viral upper respiratory tract infection with cough    2. Sore throat        PLAN    Patient is in no acute distress.    Temp is 98.3 in clinic today, lung sounds were clear, and O2 sats at 94% on RA.    RST was negative.  We will call with PCR results only if positive.  Influenza was negative.    Rest, Push fluids, vaporizer, elevation of head of bed.  Ibuprofen and or Tylenol for any fever or body aches.  Over the counter cough suppressant- PRN- as discussed.   If symptoms worsen, recheck immediately otherwise follow up with your PCP in 1 week if symptoms are not improving.  Worrisome symptoms discussed with instructions to go to the ED.  Patient verbalized understanding and agreed with this  plan.    Labs:    Results for orders placed or performed in visit on 12/13/23   Streptococcus A Rapid Screen w/Reflex to PCR - Clinic Collect     Status: Normal    Specimen: Throat; Swab   Result Value Ref Range    Group A Strep antigen Negative Negative   Influenza A & B Antigen - Clinic Collect     Status: Normal    Specimen: Nose; Swab   Result Value Ref Range    Influenza A antigen Negative Negative    Influenza B antigen Negative Negative    Narrative    Test results must be correlated with clinical data. If necessary, results should be confirmed by a molecular assay or viral culture.        Vital signs reviewed by Reyes Sheffield PA-C  /81   Pulse 83   Temp 98.3  F (36.8  C) (Tympanic)   Resp (!) 32   Wt 99.7 kg (219 lb 12.8 oz)   LMP  (LMP Unknown)   SpO2 94%   BMI 39.56 kg/m      Current Meds      Current Outpatient Medications:     albuterol (PROAIR HFA/PROVENTIL HFA/VENTOLIN HFA) 108 (90 Base) MCG/ACT inhaler, Inhale 1-2 puffs into the lungs every 4 hours as needed, Disp: , Rfl:     benzonatate (TESSALON) 100 MG capsule, Take 100 mg by mouth 3 times daily as needed, Disp: , Rfl:       Respiratory History    occasional episodes of bronchitis      SUBJECTIVE    HPI: Paulette Flores is an 58 year old female who presents with chest congestion, cough nonproductive, occasional, ear pain right, and sore throat.  Symptoms began 1  week ago and has unchanged.  There is no shortness of breath, wheezing, and chest pain.  Patient is eating and drinking well.  No fever, nausea, vomiting, or diarrhea.    Patient denies any recent travel or exposure to known COVID positive tested individual.      ROS:     Review of Systems   Constitutional:  Negative for chills and fever.   HENT:  Positive for congestion. Negative for ear pain, rhinorrhea and sore throat.    Eyes: Negative.    Respiratory:  Positive for cough. Negative for shortness of breath.    Cardiovascular: Negative.  Negative for chest pain and  "palpitations.   Gastrointestinal:  Negative for diarrhea, nausea and vomiting.   Endocrine: Negative.    Genitourinary: Negative.    Musculoskeletal: Negative.  Negative for arthralgias, back pain, joint swelling, myalgias, neck pain and neck stiffness.   Skin: Negative.  Negative for rash and wound.   Allergic/Immunologic: Negative.  Negative for immunocompromised state.   Neurological:  Negative for dizziness, weakness, light-headedness and headaches.         Family History   Family History   Problem Relation Age of Onset    Hypertension Mother     Hypothyroidism Mother     Hypertension Father     Graves' disease Sister     Hypothyroidism Sister     Coronary Artery Disease Maternal Grandmother         MI    Hypertension Maternal Grandmother     Cerebrovascular Disease Maternal Grandfather     Diabetes Maternal Grandfather     Asthma Paternal Grandmother     Cerebrovascular Disease Paternal Grandfather     Breast Cancer No family hx of     Colon Cancer No family hx of         Problem history  Patient Active Problem List   Diagnosis    Conductive hearing loss in left ear    DDD (degenerative disc disease), lumbar    Decreased GFR    Chronic midline thoracic back pain    Family history of hypothyroidism    Left knee pain        Allergies  Allergies   Allergen Reactions    Percocet [Oxycodone-Acetaminophen] Difficulty breathing     Throat closed    Amoxicillin-Pot Clavulanate      \"stomach issues\"    Vicodin [Hydrocodone-Acetaminophen] Nausea and Vomiting        Social History  Social History     Socioeconomic History    Marital status:      Spouse name: Not on file    Number of children: Not on file    Years of education: Not on file    Highest education level: Not on file   Occupational History    Not on file   Tobacco Use    Smoking status: Light Smoker     Packs/day: 0.20     Years: 21.00     Additional pack years: 0.00     Total pack years: 4.20     Types: Cigarettes    Smokeless tobacco: Never    Tobacco " comments:     trying to quit/cut down   Vaping Use    Vaping Use: Never used   Substance and Sexual Activity    Alcohol use: No     Alcohol/week: 0.0 standard drinks of alcohol    Drug use: No    Sexual activity: Yes     Partners: Male   Other Topics Concern    Parent/sibling w/ CABG, MI or angioplasty before 65F 55M? 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   Constitutional:       General: She is not in acute distress.     Appearance: She is well-developed. She is not ill-appearing, toxic-appearing or diaphoretic.   HENT:      Head: Normocephalic and atraumatic.      Right Ear: Hearing, tympanic membrane, ear canal and external ear normal. Tympanic membrane is not perforated, erythematous, retracted or bulging.      Left Ear: Hearing, tympanic membrane, ear canal and external ear normal. Tympanic membrane is not perforated, erythematous, retracted or bulging.      Nose: Congestion present. No mucosal edema or rhinorrhea.      Right Sinus: No maxillary sinus tenderness or frontal sinus tenderness.      Left Sinus: No maxillary sinus tenderness or frontal sinus tenderness.      Mouth/Throat:      Pharynx: Posterior oropharyngeal erythema present. No pharyngeal swelling, oropharyngeal exudate or uvula swelling.      Tonsils: No tonsillar exudate or tonsillar abscesses. 0 on the  right. 0 on the left.   Eyes:      General:         Right eye: No discharge.         Left eye: No discharge.      Pupils: Pupils are equal, round, and reactive to light.   Cardiovascular:      Rate and Rhythm: Normal rate and regular rhythm.      Heart sounds: Normal heart sounds. No murmur heard.     No friction rub. No gallop.   Pulmonary:      Effort: Pulmonary effort is normal. No respiratory distress.      Breath sounds: Normal breath sounds. No decreased breath sounds, wheezing, rhonchi or rales.   Chest:      Chest wall: No tenderness.   Abdominal:      General: Bowel sounds are normal. There is no distension.      Palpations: Abdomen is soft. There is no mass.      Tenderness: There is no abdominal tenderness. There is no guarding.   Musculoskeletal:      Cervical back: Normal range of motion and neck supple.   Lymphadenopathy:      Head:      Right side of head: No submental, submandibular, tonsillar, preauricular or posterior auricular adenopathy.      Left side of head: No submental, submandibular, tonsillar, preauricular or posterior auricular adenopathy.      Cervical: No cervical adenopathy.      Right cervical: No superficial or posterior cervical adenopathy.     Left cervical: No superficial or posterior cervical adenopathy.   Skin:     General: Skin is warm and dry.      Findings: No rash.   Neurological:      Mental Status: She is alert and oriented to person, place, and time.      Cranial Nerves: No cranial nerve deficit.      Deep Tendon Reflexes: Reflexes are normal and symmetric.   Psychiatric:         Behavior: Behavior normal. Behavior is cooperative.         Thought Content: Thought content normal.         Judgment: Judgment normal.           Reyes Sheffield PA-C  12/13/2023, 11:13 AM

## 2023-12-20 ENCOUNTER — TELEPHONE (OUTPATIENT)
Dept: OTOLARYNGOLOGY | Facility: CLINIC | Age: 58
End: 2023-12-20
Payer: COMMERCIAL

## 2023-12-20 NOTE — TELEPHONE ENCOUNTER
[facility] Call Center    Phone Message    May a detailed message be left on voicemail: yes     Reason for Call: Symptoms or Concerns     If patient has red-flag symptoms, warm transfer to triage line    Current symptom or concern: Per pt she had a nurse look in her ear from work and it is red and swollen and she is wonder if she can get a prescription    Symptoms have been present for:  1 day(s)    Has patient previously been seen for this? No    By: Dr. Villanueva    Date: unknown    Are there any new or worsening symptoms? Yes: er pt she had a nurse look in her ear from work and it is red and swollen and she is wonder if she can get a prescription    Action Taken: Message routed to:  [facility] ENT    Travel Screening: Not Applicable

## 2023-12-20 NOTE — TELEPHONE ENCOUNTER
Attempted to call patient. Busy signal  Message sent to patient.   Patient needs appointment for eval prior to meds    Angelica MARTINEZ RN  [facility]

## 2024-02-20 ENCOUNTER — OFFICE VISIT (OUTPATIENT)
Dept: FAMILY MEDICINE | Facility: CLINIC | Age: 59
End: 2024-02-20
Payer: COMMERCIAL

## 2024-02-20 VITALS
TEMPERATURE: 98.2 F | OXYGEN SATURATION: 98 % | WEIGHT: 218 LBS | RESPIRATION RATE: 16 BRPM | HEART RATE: 78 BPM | DIASTOLIC BLOOD PRESSURE: 64 MMHG | SYSTOLIC BLOOD PRESSURE: 126 MMHG | BODY MASS INDEX: 39.24 KG/M2

## 2024-02-20 DIAGNOSIS — H81.12 BENIGN PAROXYSMAL POSITIONAL VERTIGO OF LEFT EAR: Primary | ICD-10-CM

## 2024-02-20 DIAGNOSIS — J01.90 SUBACUTE SINUSITIS, UNSPECIFIED LOCATION: ICD-10-CM

## 2024-02-20 PROCEDURE — 99213 OFFICE O/P EST LOW 20 MIN: CPT | Performed by: PHYSICIAN ASSISTANT

## 2024-02-20 RX ORDER — DOXYCYCLINE 100 MG/1
100 CAPSULE ORAL 2 TIMES DAILY
Qty: 20 CAPSULE | Refills: 0 | Status: SHIPPED | OUTPATIENT
Start: 2024-02-20 | End: 2024-03-01

## 2024-02-20 RX ORDER — PREDNISONE 20 MG/1
TABLET ORAL
Qty: 11 TABLET | Refills: 0 | Status: SHIPPED | OUTPATIENT
Start: 2024-02-20 | End: 2024-08-08

## 2024-02-20 NOTE — PROGRESS NOTES
"  Assessment & Plan       ICD-10-CM    1. Benign paroxysmal positional vertigo of left ear  H81.12 Physical Therapy Referral      2. Subacute sinusitis, unspecified location  J01.90 doxycycline hyclate (VIBRAMYCIN) 100 MG capsule     predniSONE (DELTASONE) 20 MG tablet          1) Referral back to physical therapy for Vertigo treatment.    2) Doxycycline and prednisone B&T. Follow up if symptoms fail to improve or worsen.       Prescription drug management    BMI  Estimated body mass index is 39.24 kg/m  as calculated from the following:    Height as of 6/28/23: 1.588 m (5' 2.5\").    Weight as of this encounter: 98.9 kg (218 lb).     Return in about 4 months (around 6/20/2024) for your annual physical, a med check, repeat labs, with Laura, in person.        Erendira Caldwell is a 58 year old, presenting for the following health issues:  Dizziness        2/20/2024    10:44 AM   Additional Questions   Roomed by Inocencia   Accompanied by Self         2/20/2024    10:44 AM   Patient Reported Additional Medications   Patient reports taking the following new medications na       Dizziness  Onset/Duration: Years, has been progressively getting worse, last Friday was pts worst episode. Friday pt woke up with vertigo and didn't start to feel better after 8:30PM, had to stay in fixed position to get relief. Pt unsure if related to worsening symptoms of Veritgo but in December pt was sick and still has lingering symptoms of having to constantly blow nose, sinus pressure pt and occasional sharp pains under Right Ear.  Description:   Do you feel faint: No  Does it feel like the surroundings (bed, room) are moving: YES  Unsteady/off balance: YES  Have you passed out or fallen: YES- Has fallen but did not injure self   Intensity: moderate to severe  Progression of Symptoms: worsening  Accompanying Signs & Symptoms:  Heart palpitations or chest pain: No  Nausea, vomiting: YES- Nausea with vomiting   Weakness or lack of coordination " in arms or legs: No  Vision or speech changes: No  Numbness or tingling: No  Ringing in ears (Tinnitus): YES- Left Ear   Hearing Loss: YES- Pt has had 6 surgery in Left Ear   History:   Head trauma/concussion history: YES - fall where pt hit head 3 years ago, was never seen for injury(per pt vertigo symptoms started pre-injury)  Previous similar symptoms: YES  Recent bleeding history: No  Any new medications (BP?): No  Precipitating factors:   Worse with activity: YES- Sometimes when pt is driving and goes over bump or hill. Sleeping will also make symptoms worse.   Worse with head movement: YES  Alleviating factors:   Does staying in a fixed position give relief: YES  Therapies tried and outcome: Treatment Therapy when pt was in her 30's, 40's and 50's with temporary relief, symptoms always return.     Persistent sinus congestion since December  Sinus pressure on right face and behind ear  Clear mucous  Went to  twice - gave her Tessalon pearles for cough  Mucinex has helped, but sxs haven't resolved       History of Present Illness       Reason for visit:  Vertigo    She eats 4 or more servings of fruits and vegetables daily.She consumes 0 sweetened beverage(s) daily.She exercises with enough effort to increase her heart rate 20 to 29 minutes per day.  She exercises with enough effort to increase her heart rate 3 or less days per week.   She is taking medications regularly.        Review of Systems  Constitutional, neuro, ENT systems are negative, except as otherwise noted.      Objective    /64 (BP Location: Left arm, Patient Position: Chair, Cuff Size: Adult Large)   Pulse 78   Temp 98.2  F (36.8  C) (Tympanic)   Resp 16   Wt 98.9 kg (218 lb)   LMP  (LMP Unknown)   SpO2 98%   BMI 39.24 kg/m    Body mass index is 39.24 kg/m .  Physical Exam   GENERAL: alert and no distress  EYES: nystagmus on the left  MS: no gross musculoskeletal defects noted, no edema  SKIN: no suspicious lesions or  marilyn  NEURO: Normal strength and tone, mentation intact and speech normal  PSYCH: mentation appears normal, affect normal/bright          Signed Electronically by: Laura Sarah PA-C

## 2024-02-26 ENCOUNTER — THERAPY VISIT (OUTPATIENT)
Dept: PHYSICAL THERAPY | Facility: CLINIC | Age: 59
End: 2024-02-26
Payer: COMMERCIAL

## 2024-02-26 DIAGNOSIS — H81.11 BENIGN PAROXYSMAL POSITIONAL VERTIGO, RIGHT: Primary | ICD-10-CM

## 2024-02-26 PROBLEM — H81.12 BENIGN PAROXYSMAL POSITIONAL VERTIGO OF LEFT EAR: Status: ACTIVE | Noted: 2024-02-26

## 2024-02-26 PROCEDURE — 95992 CANALITH REPOSITIONING PROC: CPT | Mod: GP | Performed by: PHYSICAL THERAPIST

## 2024-02-26 PROCEDURE — 97162 PT EVAL MOD COMPLEX 30 MIN: CPT | Mod: GP | Performed by: PHYSICAL THERAPIST

## 2024-02-28 ENCOUNTER — THERAPY VISIT (OUTPATIENT)
Dept: PHYSICAL THERAPY | Facility: CLINIC | Age: 59
End: 2024-02-28
Payer: COMMERCIAL

## 2024-02-28 DIAGNOSIS — R26.81 UNSTEADINESS ON FEET: ICD-10-CM

## 2024-02-28 DIAGNOSIS — H81.11 BENIGN PAROXYSMAL POSITIONAL VERTIGO, RIGHT: Primary | ICD-10-CM

## 2024-02-28 PROCEDURE — 97750 PHYSICAL PERFORMANCE TEST: CPT | Mod: GP | Performed by: PHYSICAL THERAPIST

## 2024-02-28 PROCEDURE — 97112 NEUROMUSCULAR REEDUCATION: CPT | Mod: GP | Performed by: PHYSICAL THERAPIST

## 2024-02-28 PROCEDURE — 97140 MANUAL THERAPY 1/> REGIONS: CPT | Mod: GP | Performed by: PHYSICAL THERAPIST

## 2024-03-04 ENCOUNTER — THERAPY VISIT (OUTPATIENT)
Dept: PHYSICAL THERAPY | Facility: CLINIC | Age: 59
End: 2024-03-04
Payer: COMMERCIAL

## 2024-03-04 DIAGNOSIS — R26.81 UNSTEADINESS ON FEET: Primary | ICD-10-CM

## 2024-03-04 PROCEDURE — 97112 NEUROMUSCULAR REEDUCATION: CPT | Mod: GP | Performed by: PHYSICAL THERAPIST

## 2024-03-04 NOTE — PATIENT INSTRUCTIONS
The goal of habituation exercise is to reduce the dizziness through repeated exposure to specific movements or visual stimuli that provokes your dizziness. These exercises are designed to mildly, or at the most, moderately provoke your symptoms of dizziness. Over time, with good compliance and perseverance, the dizziness intensity can reduce due to the brain learning to ignore the abnormal signal.    Habituation Principles  Perform movement or activity that provokes dizziness  When dizziness occurs, stop movement or activity  Use grounding techniques: Focus down on feet if standing, focus down on buttocks/legs if sitting.  You can also use visual fixation on stable target  Wait for symptoms to return to baseline  Continue movement or activity again and repeat steps if dizziness starts again

## 2024-03-13 ENCOUNTER — THERAPY VISIT (OUTPATIENT)
Dept: PHYSICAL THERAPY | Facility: CLINIC | Age: 59
End: 2024-03-13
Payer: COMMERCIAL

## 2024-03-13 DIAGNOSIS — R26.81 UNSTEADINESS ON FEET: ICD-10-CM

## 2024-03-13 DIAGNOSIS — H81.11 BENIGN PAROXYSMAL POSITIONAL VERTIGO, RIGHT: Primary | ICD-10-CM

## 2024-03-13 PROCEDURE — 97535 SELF CARE MNGMENT TRAINING: CPT | Mod: GP | Performed by: PHYSICAL THERAPIST

## 2024-03-13 PROCEDURE — 97112 NEUROMUSCULAR REEDUCATION: CPT | Mod: GP | Performed by: PHYSICAL THERAPIST

## 2024-03-13 NOTE — PATIENT INSTRUCTIONS
Foods to avoid:  Alcohol, especially red wine and beer  Aged cheeses, such as blue cheese or Parmesan  Aspartame and other artificial sweeteners  Beans  Caffeine  Chocolate  Citrus fruits  Foods with monosodium glutamate (MSG)  Deli meats

## 2024-03-14 ENCOUNTER — TELEPHONE (OUTPATIENT)
Dept: FAMILY MEDICINE | Facility: CLINIC | Age: 59
End: 2024-03-14
Payer: COMMERCIAL

## 2024-03-14 DIAGNOSIS — H81.12 BENIGN PAROXYSMAL POSITIONAL VERTIGO OF LEFT EAR: Primary | ICD-10-CM

## 2024-03-14 DIAGNOSIS — R42 DIZZINESS: ICD-10-CM

## 2024-03-14 NOTE — TELEPHONE ENCOUNTER
----- Message from Cata Mckenzie PT sent at 3/13/2024  2:47 PM CDT -----  Regarding: RE: ENT testing  Sorry! I combined those two thoughts.     I think she can start with VNG testing which will actually be done through audiology. If that doesn't show anything (I can review the results with her),  another thought would be to have ENT take a peek. I know the lead time on ENT is quite awhile often so may want to get the process going on that as well.       ----- Message -----  From: Laura Sarah PA-C  Sent: 3/13/2024   2:20 PM CDT  To: Cata Mckenzie PT  Subject: RE: ENT testing                                  I order that through the ENT referral?  Laura    ----- Message -----  From: Cata Mckenzie PT  Sent: 3/13/2024   2:13 PM CDT  To: Laura Sarah PA-C  Subject: ENT testing                                      Hi,    I have been working with Paulette for 5 sessions for the diagnosis of BPPV. At first she made significant progress reporting complete symptom resolution with rolling and bed mobility following repositioning maneuvers; we were able to even progress to higher level balance training and habituation exercises without symptoms. Today she returned stating the spinning had returned over the weekend but she was negative for BPPV testing. Her symptom report has always varied a little I would recommend another look by ENT.     Would you be able to place a referral for VNG testing? They do it [facility] at [facility] and in [place]. She has had these symptoms in episodic fashion over the last 5 years. Think it would be helpful to have a deeper evaluation at this point.     Let me know if you have additional questions.  Thank you!  Cata Mckenzie, PT, DPT

## 2024-03-15 ENCOUNTER — TELEPHONE (OUTPATIENT)
Dept: OTOLARYNGOLOGY | Facility: CLINIC | Age: 59
End: 2024-03-15
Payer: COMMERCIAL

## 2024-03-15 NOTE — TELEPHONE ENCOUNTER
[facility] Call Center    Phone Message    May a detailed message be left on voicemail: no     Reason for Call: Appointment Intake    Referring Provider Name: Laura Sarah PA-C   Diagnosis and/or Symptoms: BALANCE TESTING    PER PROTOCOLS NEEDS TO BE SCHEDULED BY CLINIC-PT ALSO HAS AN ENT REFERRAL FOR DIZZINESS, POSSIBLE FOR HER TO DO BOTH APPTS SAME DAY? UNABLE TO SPEAK WITH PT, LEFT VOICE MAIL    Action Taken: Message routed to:  [facility] ENT    Travel Screening: Not Applicable

## 2024-03-15 NOTE — TELEPHONE ENCOUNTER
Left Voicemail (1st Attempt) for the patient to call back and schedule the following:    Appointment type: BAL  Provider: any  Return date: next available  Specialty phone number: direct  Additional appointment(s) needed:   Additonal Notes: VNG  test. Can do in [place]. Patient already established with Dr. meyer

## 2024-03-18 NOTE — TELEPHONE ENCOUNTER
[facility] Call Center    Phone Message    May a detailed message be left on voicemail: yes     Reason for Call: Other: Pt returning call. Please call back. Thanks.     Action Taken: Other: ENT    Travel Screening: Not Applicable

## 2024-03-18 NOTE — TELEPHONE ENCOUNTER
Left Voicemail (1st Attempt) for the patient to call back and schedule the following:     Appointment type: BAL  Provider: any  Return date: next available  Specialty phone number: direct  Additional appointment(s) needed:   Additonal Notes: VNG  test. Can do in [place]. Patient already established with Dr. meyer

## 2024-04-05 ENCOUNTER — OFFICE VISIT (OUTPATIENT)
Dept: AUDIOLOGY | Facility: CLINIC | Age: 59
End: 2024-04-05
Payer: COMMERCIAL

## 2024-04-05 DIAGNOSIS — R42 DIZZINESS: ICD-10-CM

## 2024-04-05 DIAGNOSIS — H81.12 BENIGN PAROXYSMAL POSITIONAL VERTIGO OF LEFT EAR: ICD-10-CM

## 2024-04-05 PROCEDURE — 92542 POSITIONAL NYSTAGMUS TEST: CPT | Performed by: AUDIOLOGIST

## 2024-04-05 PROCEDURE — 92537 CALORIC VSTBLR TEST W/REC: CPT | Performed by: AUDIOLOGIST

## 2024-04-05 PROCEDURE — 92567 TYMPANOMETRY: CPT | Performed by: AUDIOLOGIST

## 2024-04-05 NOTE — PROGRESS NOTES
AUDIOLOGY REPORT-BALANCE ASSESSMENT    SUBJECTIVE: Paulette Flores, 59 year old, was seen in Audiology at the [facility] on 4/5/2024, for videonystagmography (VNG), referred by Laura Sarah PA-C. Patient presents with a chief complaint of dizziness. She describes her symptoms as an intermittent sensation of spinning or floating. She also sometimes has a feeling of fogginess or of being in a tunnel after turning. Symptoms typically last for a few seconds but have lasted up to a day. The patient reports that she has been treated for benign paroxysmal positional vertigo (BPPV) before, but recent testing for BPPV was negative. The patient also reports that she has not had a lot of true vertigo recently. The patient reports that rolling over in bed makes symptoms worse, and she sometimes wakes up due to symptoms, which are typically a floating sensation. Movement makes symptoms  worse, and sitting still helps. The patient also notes increased symptoms with changes in weather and temperature, and symptoms are worse in the spring and fall. The patient does not have major concerns about balance or falls, and she has not fallen.    The patient reports a history of six surgeries in her left ear, most recently when she was in her 40s. She reports that her dizziness symptoms first started after her ear surgeries. She reports a history of frequent ear infections as a child. She has had some ear infections as an adult as well, most recently around two years ago. 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 She reports that she has had surgical grafting in the left ear with her own tissue, but does not have any prosthetics in the ear. The patient reports that she has not had any recent ear drainage and she does not have ear pain  or pressure. She reports occasional buzzing and snapping tinnitus in the left ear lasting for up to a minute. Her last audiogram was on 11/19/2021 and results showed essentially normal hearing sensitivity in the right ear and mild to severe conductive hearing loss in the left ear. She has not noted any recent changes in hearing. The patient reports a family history of hearing loss with age. She notes that her mother has also had vertigo.    The patient reports that she does not generally get headaches or migraines, though she does have a headache today due to discontinuing caffeine. She reports that a niece and nephew have migraines. The patient reports that changes in light seem to increase her dizziness symptoms. She does not have a persistent sensation of motion after stopping, or a sensation of motion when still. She does not have motion intolerance. The patient reports that she does not have dizziness with abrupt pressure changes, such as coughing or lifting a heavy object.    The patient reports that she does not have vision concerns, blurred vision, or history of eye surgery. She reports that she does not have a history or cancer or chemotherapy.    Patient denies consumption of caffeinated beverages, nicotine, alcoholic substances, or use of medications with known vestibular interactions within the past 48 hours.    OBJECTIVE:  Videonystagmography (VNG) testing:  Prescreening:  Tympanograms: Normal eardrum mobility in the right ear. Hypercompliant eardrum mobility with an unusual shape and borderline negative pressure was noted in the left ear, with normal ear canal volume (consistent with previous testing). Note: this test is completed to determine the status of the middle ear before irrigations are completed.  Ocular range of motion and ocular counter roll: Normal, though patient found the range of motion task unpleasant and closed her eyes, especially for vertical eye movement.  Cross/cover: Normal  Head  Thrust: Negative     Nystagmus Tests:  Gaze-Horizontal with Fixation:   Center: Normal   Right: Normal   Left: Normal  Gaze-Vertical with Fixation:   Up: Normal   Down: Normal  Gaze with Fixation Denied   Center: 2 degree/second up beating nystagmus   Center repeat: 3 deg/s up beating and 1 deg/s left beating nystagmus   Right: Normal   Left: 2 deg/s left beating nystagmus (possibly endpoint/non-significant)   Up: 3 deg/s up beating nystagmus  High Frequency Headshake:   Horizontal: Negative   Vertical: Negative    Malathi-Hallpike Head Right: negative for nystagmus & symptoms   Malathi-Hallpike Head Left: negative for nystagmus & symptoms   Roll Test Head Right: negative for nystagmus & symptoms   Roll Test Head Left: negative for nystagmus & symptoms     Positional Testing:  Positionals: Supine: Normal  Positionals: Body Right: Mild non-fatiguing right rotary nystagmus  Positionals: Body Left: 3 deg/s up beating nystagmus  Positionals: Pre-Caloric: 2 deg/s up beating and 1 deg/s left beating nystagmus  NOTE: nystagmus suppressed with fixation    Oculomotor Tests:  Saccades: Normal  Anti-saccades: Normal  Pursuit: Normal gain with mildly saccadic pursuit    Calorics :  (Tested at 44 degrees and 30 degrees Celsius for 30 seconds for warm and cool water, respectively):  Right Warm Eye Speed: 11 degrees per second right beating  Left Warm Eye Speed: 13 degrees per second left beating  Right Cool Eye Speed: 6 degrees per second left beating  Left Cool Eye Speed: 7 degrees per second right beating  Difference between ear: 8% right hypofunction. (Greater than 25% considered clinically significant.)  Fixation Index: 0.08 Normal  Overall caloric test: Normal    Post Irrigations Otoscopy: Normal    The patient reported a sensation of water running down her throat during the second right irrigation, so tympanograms were repeated after calorics. Results were unchanged and normal ear canal volume was consistent with intact tympanic  membranes bilaterally.    ASSESSMENT:  1. Indications of central vestibular system involvement noted on today's exam were as follows:   - Mild spontaneous up beating nystagmus, intermittently mixed with left beating nystagmus, was evident throughout much of testing  - Mildly saccadic pursuit    2. There were no indications of peripheral vestibular system involvement noted on today's exam.     PLAN:  Follow-up with Laura Sarah PA-C and Dr. Mena regarding today's results and for medical management.  Please call this clinic at [phone] with questions regarding these results or recommendations.       Dionte Shaw, CCC-A  [place] Licensed Audiologist #[id number]  4/5/2024     CC:  BEV Long MD Stephanie Levasseur, PT

## 2024-04-09 NOTE — PROGRESS NOTES
"History of Present Illness - Paulette Flores is a very pleasant 59 year old female here to see me for the first time for dizziness.     She describes her symptoms as an intermittent sensation of spinning or floating. She also sometimes has a feeling of fogginess or of being in a tunnel after turning. Symptoms typically last for a few seconds but have lasted up to a day. The patient reports that she has been treated for benign paroxysmal positional vertigo (BPPV) before, but recent testing for BPPV was negative. The patient also reports that she has not had a lot of true vertigo recently. The patient reports that rolling over in bed makes symptoms worse, and she sometimes wakes up due to symptoms, which are typically a floating sensation. Movement makes symptoms worse, and sitting still helps. The patient also notes increased symptoms with changes in weather and temperature, and symptoms are worse in the spring and fall.     She recently had a visit on 4/5/24 for VNG testing. The only peripheral finding was a very mild 8% hypofunction on the RIGHT.  Otherwise assessment as follows:  1. Indications of central vestibular system involvement noted on today's exam were as follows:   - Mild spontaneous up beating nystagmus, intermittently mixed with left beating nystagmus, was evident throughout much of testing  - Mildly saccadic pursuit     2. There were no indications of peripheral vestibular system involvement noted on today's exam.     She tells me that she has had recurrent vertigo and has had many visits to the PT for canalith maneuvers.  She has always had this in the LEFT ear, and the PT worked.  But then the RIGHT side seems to be having issues.  She is Vestibular rehab again as well.  But then one morning she woke up feeling very light headed.  She had no other symptoms such as headache, double vision, nausea, or headache.      The only pattern she has noticed with this \"new\" type of dizziness is that with big " "weather changes or barometric shifts, she gets more symptoms.  Also, in general her vertigo seems to be primarily in the fall or spring.      Past Medical History -   Patient Active Problem List   Diagnosis    Conductive hearing loss in left ear    DDD (degenerative disc disease), lumbar    Decreased GFR    Chronic midline thoracic back pain    Family history of hypothyroidism    Left knee pain    Benign paroxysmal positional vertigo, right    Unsteadiness on feet       Current Medications -   Current Outpatient Medications:     albuterol (PROAIR HFA/PROVENTIL HFA/VENTOLIN HFA) 108 (90 Base) MCG/ACT inhaler, Inhale 1-2 puffs into the lungs every 4 hours as needed (Patient not taking: Reported on 2/20/2024), Disp: , Rfl:     benzonatate (TESSALON) 100 MG capsule, Take 100 mg by mouth 3 times daily as needed (Patient not taking: Reported on 2/20/2024), Disp: , Rfl:     predniSONE (DELTASONE) 20 MG tablet, Take 2 tabs daily x 3 days, then 1 tab daily x 3 days, then 1/2 tab daily x4 days., Disp: 11 tablet, Rfl: 0    Allergies -   Allergies   Allergen Reactions    Percocet [Oxycodone-Acetaminophen] Difficulty breathing     Throat closed    Amoxicillin-Pot Clavulanate      \"stomach issues\"    Vicodin [Hydrocodone-Acetaminophen] Nausea and Vomiting       Social History -   Social History     Socioeconomic History    Marital status:    Tobacco Use    Smoking status: Light Smoker     Packs/day: 0.20     Years: 21.00     Additional pack years: 0.00     Total pack years: 4.20     Types: Cigarettes    Smokeless tobacco: Never    Tobacco comments:     trying to quit/cut down   Vaping Use    Vaping Use: Never used   Substance and Sexual Activity    Alcohol use: No     Alcohol/week: 0.0 standard drinks of alcohol    Drug use: No    Sexual activity: Yes     Partners: Male   Social History Narrative    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28    Grandchildren     Social Determinants of Health     Interpersonal " Safety: Low Risk  (2/20/2024)    Interpersonal Safety     Do you feel physically and emotionally safe where you currently live?: Yes     Within the past 12 months, have you been hit, slapped, kicked or otherwise physically hurt by someone?: No     Within the past 12 months, have you been humiliated or emotionally abused in other ways by your partner or ex-partner?: No       Family History -   Family History   Problem Relation Age of Onset    Hypertension Mother     Hypothyroidism Mother     Hypertension Father     Graves' disease Sister     Hypothyroidism Sister     Coronary Artery Disease Maternal Grandmother         MI    Hypertension Maternal Grandmother     Cerebrovascular Disease Maternal Grandfather     Diabetes Maternal Grandfather     Asthma Paternal Grandmother     Cerebrovascular Disease Paternal Grandfather     Breast Cancer No family hx of     Colon Cancer No family hx of        Review of Systems - As per HPI and PMHx, otherwise 10+ system review of the head and neck, and general constitution is negative.    Physical Exam  /80   Pulse 55   Resp 16   Wt 98.9 kg (218 lb)   LMP  (LMP Unknown)   SpO2 94%   BMI 39.24 kg/m        General - The patient is well nourished and well developed, and appears to have good nutritional status.  Alert and oriented to person and place, answers questions and cooperates with examination appropriately.   Head and Face - Normocephalic and atraumatic, with no gross asymmetry noted of the contour of the facial features.  The facial nerve is intact, with strong symmetric movements.  Voice and Breathing - The patient was breathing comfortably without the use of accessory muscles. There was no wheezing, stridor, or stertor.  The patients voice was clear and strong, and had appropriate pitch and quality.  Ears - The tympanic membranes are normal in appearance, bony landmarks are intact.  No retraction, perforation, or masses.  No fluid or purulence was seen in the  external canal or the middle ear. No evidence of infection of the middle ear or external canal, cerumen was normal in appearance.  Eyes - Extraocular movements intact, and the pupils were reactive to light.  Sclera were not icteric or injected, conjunctiva were pink and moist.  ABNORMAL Balance evaluation - The patient was able to stand independently in the room, and had slight swaying with heels together and eyes closed.  On standing heel to toe, however, the patient immediately stumbled to the RIGHT with eyes closed.            A/P - Paulette Flores is a 59 year old female  (H81.12) Benign paroxysmal positional vertigo of left ear  (R42) Dizziness    Although her long term history does seem consistent with benign paroxysmal positional vertigo, these newer symptoms sound like something different.  The changes in weather and seasonal changes suggests possible hydrops, maybe even an atypical Meniere's.    Since she is now asymptomatic, we will wait.  But if the problems return, I would recommend an empiric trial of antihistamines and perhaps low dose Dyazide.    She also has an appointment on August 8 at [place], and I will see her then for follow up

## 2024-04-15 ENCOUNTER — OFFICE VISIT (OUTPATIENT)
Dept: OTOLARYNGOLOGY | Facility: CLINIC | Age: 59
End: 2024-04-15
Payer: COMMERCIAL

## 2024-04-15 VITALS
DIASTOLIC BLOOD PRESSURE: 80 MMHG | RESPIRATION RATE: 16 BRPM | OXYGEN SATURATION: 94 % | WEIGHT: 218 LBS | SYSTOLIC BLOOD PRESSURE: 125 MMHG | HEART RATE: 55 BPM | BODY MASS INDEX: 39.24 KG/M2

## 2024-04-15 DIAGNOSIS — H81.12 BENIGN PAROXYSMAL POSITIONAL VERTIGO OF LEFT EAR: ICD-10-CM

## 2024-04-15 DIAGNOSIS — R42 DIZZINESS: ICD-10-CM

## 2024-04-15 PROCEDURE — 99213 OFFICE O/P EST LOW 20 MIN: CPT | Performed by: OTOLARYNGOLOGY

## 2024-04-15 ASSESSMENT — PAIN SCALES - GENERAL: PAINLEVEL: NO PAIN (0)

## 2024-04-15 NOTE — LETTER
4/15/2024         RE: Paulette Flores  [address]  [place] [address]        Dear Colleague,    Thank you for referring your patient, Paulette Flores, to the [facility]. Please see a copy of my visit note below.    History of Present Illness - Paulette Flores is a very pleasant 59 year old female here to see me for the first time for dizziness.     She describes her symptoms as an intermittent sensation of spinning or floating. She also sometimes has a feeling of fogginess or of being in a tunnel after turning. Symptoms typically last for a few seconds but have lasted up to a day. The patient reports that she has been treated for benign paroxysmal positional vertigo (BPPV) before, but recent testing for BPPV was negative. The patient also reports that she has not had a lot of true vertigo recently. The patient reports that rolling over in bed makes symptoms worse, and she sometimes wakes up due to symptoms, which are typically a floating sensation. Movement makes symptoms worse, and sitting still helps. The patient also notes increased symptoms with changes in weather and temperature, and symptoms are worse in the spring and fall.     She recently had a visit on 4/5/24 for VNG testing. The only peripheral finding was a very mild 8% hypofunction on the RIGHT.  Otherwise assessment as follows:  1. Indications of central vestibular system involvement noted on today's exam were as follows:   - Mild spontaneous up beating nystagmus, intermittently mixed with left beating nystagmus, was evident throughout much of testing  - Mildly saccadic pursuit     2. There were no indications of peripheral vestibular system involvement noted on today's exam.     She tells me that she has had recurrent vertigo and has had many visits to the PT for canalith maneuvers.  She has always had this in the LEFT ear, and the PT worked.  But then the RIGHT side seems to be having issues.  She is Vestibular rehab  "again as well.  But then one morning she woke up feeling very light headed.  She had no other symptoms such as headache, double vision, nausea, or headache.      The only pattern she has noticed with this \"new\" type of dizziness is that with big weather changes or barometric shifts, she gets more symptoms.  Also, in general her vertigo seems to be primarily in the fall or spring.      Past Medical History -   Patient Active Problem List   Diagnosis     Conductive hearing loss in left ear     DDD (degenerative disc disease), lumbar     Decreased GFR     Chronic midline thoracic back pain     Family history of hypothyroidism     Left knee pain     Benign paroxysmal positional vertigo, right     Unsteadiness on feet       Current Medications -   Current Outpatient Medications:      albuterol (PROAIR HFA/PROVENTIL HFA/VENTOLIN HFA) 108 (90 Base) MCG/ACT inhaler, Inhale 1-2 puffs into the lungs every 4 hours as needed (Patient not taking: Reported on 2/20/2024), Disp: , Rfl:      benzonatate (TESSALON) 100 MG capsule, Take 100 mg by mouth 3 times daily as needed (Patient not taking: Reported on 2/20/2024), Disp: , Rfl:      predniSONE (DELTASONE) 20 MG tablet, Take 2 tabs daily x 3 days, then 1 tab daily x 3 days, then 1/2 tab daily x4 days., Disp: 11 tablet, Rfl: 0    Allergies -   Allergies   Allergen Reactions     Percocet [Oxycodone-Acetaminophen] Difficulty breathing     Throat closed     Amoxicillin-Pot Clavulanate      \"stomach issues\"     Vicodin [Hydrocodone-Acetaminophen] Nausea and Vomiting       Social History -   Social History     Socioeconomic History     Marital status:    Tobacco Use     Smoking status: Light Smoker     Packs/day: 0.20     Years: 21.00     Additional pack years: 0.00     Total pack years: 4.20     Types: Cigarettes     Smokeless tobacco: Never     Tobacco comments:     trying to quit/cut down   Vaping Use     Vaping Use: Never used   Substance and Sexual Activity     Alcohol use: " No     Alcohol/week: 0.0 standard drinks of alcohol     Drug use: No     Sexual activity: Yes     Partners: Male   Social History Narrative    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28    Grandchildren     Social Determinants of Health     Interpersonal Safety: Low Risk  (2/20/2024)    Interpersonal Safety      Do you feel physically and emotionally safe where you currently live?: Yes      Within the past 12 months, have you been hit, slapped, kicked or otherwise physically hurt by someone?: No      Within the past 12 months, have you been humiliated or emotionally abused in other ways by your partner or ex-partner?: No       Family History -   Family History   Problem Relation Age of Onset     Hypertension Mother      Hypothyroidism Mother      Hypertension Father      Graves' disease Sister      Hypothyroidism Sister      Coronary Artery Disease Maternal Grandmother         MI     Hypertension Maternal Grandmother      Cerebrovascular Disease Maternal Grandfather      Diabetes Maternal Grandfather      Asthma Paternal Grandmother      Cerebrovascular Disease Paternal Grandfather      Breast Cancer No family hx of      Colon Cancer No family hx of        Review of Systems - As per HPI and PMHx, otherwise 10+ system review of the head and neck, and general constitution is negative.    Physical Exam  /80   Pulse 55   Resp 16   Wt 98.9 kg (218 lb)   LMP  (LMP Unknown)   SpO2 94%   BMI 39.24 kg/m        General - The patient is well nourished and well developed, and appears to have good nutritional status.  Alert and oriented to person and place, answers questions and cooperates with examination appropriately.   Head and Face - Normocephalic and atraumatic, with no gross asymmetry noted of the contour of the facial features.  The facial nerve is intact, with strong symmetric movements.  Voice and Breathing - The patient was breathing comfortably without the use of accessory muscles. There was no  wheezing, stridor, or stertor.  The patients voice was clear and strong, and had appropriate pitch and quality.  Ears - The tympanic membranes are normal in appearance, bony landmarks are intact.  No retraction, perforation, or masses.  No fluid or purulence was seen in the external canal or the middle ear. No evidence of infection of the middle ear or external canal, cerumen was normal in appearance.  Eyes - Extraocular movements intact, and the pupils were reactive to light.  Sclera were not icteric or injected, conjunctiva were pink and moist.  ABNORMAL Balance evaluation - The patient was able to stand independently in the room, and had slight swaying with heels together and eyes closed.  On standing heel to toe, however, the patient immediately stumbled to the RIGHT with eyes closed.            A/P - Paulette Flores is a 59 year old female  (H81.12) Benign paroxysmal positional vertigo of left ear  (R42) Dizziness    Although her long term history does seem consistent with benign paroxysmal positional vertigo, these newer symptoms sound like something different.  The changes in weather and seasonal changes suggests possible hydrops, maybe even an atypical Meniere's.    Since she is now asymptomatic, we will wait.  But if the problems return, I would recommend an empiric trial of antihistamines and perhaps low dose Dyazide.    She also has an appointment on August 8 at [place], and I will see her then for follow up       Again, thank you for allowing me to participate in the care of your patient.        Sincerely,        Bandar Mena MD

## 2024-04-29 ENCOUNTER — THERAPY VISIT (OUTPATIENT)
Dept: PHYSICAL THERAPY | Facility: CLINIC | Age: 59
End: 2024-04-29
Payer: COMMERCIAL

## 2024-04-29 DIAGNOSIS — H81.11 BENIGN PAROXYSMAL POSITIONAL VERTIGO, RIGHT: Primary | ICD-10-CM

## 2024-04-29 DIAGNOSIS — R26.81 UNSTEADINESS ON FEET: ICD-10-CM

## 2024-04-29 PROCEDURE — 97112 NEUROMUSCULAR REEDUCATION: CPT | Mod: GP | Performed by: PHYSICAL THERAPIST

## 2024-04-29 NOTE — PROGRESS NOTES
DISCHARGE  Reason for Discharge: Patient has met all goals.    Discharge Plan: Patient to continue home program.    Referring Provider:  Laura Sarah    04/29/24 0500   Appointment Info   Signing clinician's name / credentials Cata Mckenzie, PT, DPT   Visits Used 5   Medical Diagnosis BPPV   PT Tx Diagnosis R posterior canalithesis BPPV   Progress Note/Certification   Onset of illness/injury or Date of Surgery 02/01/24   Therapy Frequency weekly   Predicted Duration up to 3 visits   Progress Note Due Date 04/26/24   PT Goal 1   Goal Identifier position tolerance   Goal Description Patient will report symptom free return to previously provoking position   Goal Progress symptom resolution   Target Date 04/26/24   Date Met 02/28/24   Subjective Report   Subjective Report Presents after folowing up with VNG and ENT as recommended indicating no peripheral involvement, 8% hypofunction and mild central involvement. Beleived to possibly be related to Menieres disease. Patient is asymptomatic and will pursue treatment if symptoms return. Presented today for graduation and final HEP discussusion   Objective Measure 1   Objective Measure mCTSIB   Details 120/120   Treatment Interventions (PT)   Interventions Neuromuscular Re-education   Therapeutic Procedure/Exercise   PTRx Ther Proc 1 Walking With Arm Swing   PTRx Ther Proc 1 - Details No Notes   PTRx Ther Proc 2 Walking Backwards   PTRx Ther Proc 2 - Details No Notes   Therapeutic Activity   PTRx Ther Act 1 Education Sheet General   PTRx Ther Act 1 - Details No Notes   Neuromuscular Re-education   Neuromuscular re-ed of mvmt, balance, coord, kinesthetic sense, posture, proprioception minutes (64702) 15   PTRx Neuro Re-ed 1 Tandem Eyes Closed Firm Surface   PTRx Neuro Re-ed 1 - Details No Notes   PTRx Neuro Re-ed 2 Semi-Tandem with Head Turns   PTRx Neuro Re-ed 2 - Details No Notes   PTRx Neuro Re-ed 3 Tandem Walking and Balance   PTRx Neuro Re-ed 3 - Details  No Notes   Skilled Intervention Reviewed and progressed HEP. Discussed modifications and symptoms   Education   Learner/Method Patient   Plan   Home program PTRX   Plan for next session DC to HEP   Total Session Time   Timed Code Treatment Minutes 15   Total Treatment Time (sum of timed and untimed services) 15

## 2024-07-12 ENCOUNTER — OFFICE VISIT (OUTPATIENT)
Dept: PODIATRY | Facility: CLINIC | Age: 59
End: 2024-07-12
Payer: COMMERCIAL

## 2024-07-12 ENCOUNTER — ANCILLARY PROCEDURE (OUTPATIENT)
Dept: GENERAL RADIOLOGY | Facility: CLINIC | Age: 59
End: 2024-07-12
Attending: PODIATRIST
Payer: COMMERCIAL

## 2024-07-12 VITALS
HEIGHT: 63 IN | SYSTOLIC BLOOD PRESSURE: 127 MMHG | BODY MASS INDEX: 38.62 KG/M2 | DIASTOLIC BLOOD PRESSURE: 81 MMHG | HEART RATE: 82 BPM | WEIGHT: 218 LBS

## 2024-07-12 DIAGNOSIS — L84 CALLUS OF FOOT: Primary | ICD-10-CM

## 2024-07-12 DIAGNOSIS — M77.51 CAPSULITIS OF METATARSOPHALANGEAL (MTP) JOINT OF RIGHT FOOT: ICD-10-CM

## 2024-07-12 DIAGNOSIS — L84 CALLUS OF FOOT: ICD-10-CM

## 2024-07-12 PROCEDURE — 73630 X-RAY EXAM OF FOOT: CPT | Mod: TC | Performed by: RADIOLOGY

## 2024-07-12 PROCEDURE — 99213 OFFICE O/P EST LOW 20 MIN: CPT | Performed by: PODIATRIST

## 2024-07-12 ASSESSMENT — PAIN SCALES - GENERAL: PAINLEVEL: SEVERE PAIN (7)

## 2024-07-12 NOTE — NURSING NOTE
"Chief Complaint   Patient presents with    RECHECK     Second toe top of the foot and into the third digit       Initial /81   Pulse 82   Ht 1.588 m (5' 2.5\")   Wt 98.9 kg (218 lb)   LMP  (LMP Unknown)   BMI 39.24 kg/m   Estimated body mass index is 39.24 kg/m  as calculated from the following:    Height as of this encounter: 1.588 m (5' 2.5\").    Weight as of this encounter: 98.9 kg (218 lb).  Medications and allergies reviewed.      Cata EMANUEL MA    "

## 2024-07-12 NOTE — PROGRESS NOTES
"Paulette returns to the office for reevaluation of the right foot.  The patient relates following the instructions given at the last visit with noted overall more pain and less improvement in function of the right foot.   The patient relates no other problems.    Vitals: /81   Pulse 82   Ht 1.588 m (5' 2.5\")   Wt 98.9 kg (218 lb)   LMP  (LMP Unknown)   BMI 39.24 kg/m    BMI= Body mass index is 39.24 kg/m .    Lower Extremity Physical Exam:      Neurovascular status remains unchanged.  Muscular exam is within normal limits to major muscle groups.  Integument is intact.      Noted pain on palpation under the lesser metatarsophalangeal joints of the right foot.  No erythema or edema noted.         Assessment:     ICD-10-CM    1. Callus of foot  L84 XR Foot Right G/E 3 Views      2. Capsulitis of metatarsophalangeal (MTP) joint of right foot  M77.51 Ankle/Foot Bracing Supplies Order Foot Insert; Right          Plan:    I have explained to Paulette about the progress of the conditions.  At this time, the patient was educated on the importance of offloading supportive shoes and other devices.  I demonstrated to the patient calf stretches to perform every hour daily until symptoms resolve.  After symptoms resolve, the patient was advised to perform the stretches 3 times daily to prevent future recurrence.  The patient was instructed to perform warm soaks with Epson salt after which to also apply over-the-counter Voltaren gel to deeply massage the injured tissue.  The patient was instructed to do this on a daily basis until symptoms resolve.   The patient was fitted with a Dynaflex insert that will aid in offloading the tension forces to the soft tissues and prevent further inflammation.   The patient may return in four weeks for reevaluation to determine if any further treatment will be needed.      Paulette verbalized agreement with and understanding of the rational for the diagnosis and treatment plan.  All " questions were answered to best of my ability and the patient's satisfaction. The patient was advised to contact the clinic with any questions that may arise after the clinic visit.      Disclaimer: This note consists of symbols derived from keyboarding, dictation and/or voice recognition software. As a result, there may be errors in the script that have gone undetected. Please consider this when interpreting information found in this chart.       NATALIE Crook D.P.M., [email or web address].

## 2024-07-12 NOTE — PATIENT INSTRUCTIONS

## 2024-07-12 NOTE — LETTER
"7/12/2024      Paulette Flores  [address]  [place] [address]      Dear Colleague,    Thank you for referring your patient, Paulette Flores, to the [facility] [place]. Please see a copy of my visit note below.    Paulette returns to the office for reevaluation of the right foot.  The patient relates following the instructions given at the last visit with noted overall more pain and less improvement in function of the right foot.   The patient relates no other problems.    Vitals: /81   Pulse 82   Ht 1.588 m (5' 2.5\")   Wt 98.9 kg (218 lb)   LMP  (LMP Unknown)   BMI 39.24 kg/m    BMI= Body mass index is 39.24 kg/m .    Lower Extremity Physical Exam:      Neurovascular status remains unchanged.  Muscular exam is within normal limits to major muscle groups.  Integument is intact.      Noted pain on palpation under the lesser metatarsophalangeal joints of the right foot.  No erythema or edema noted.         Assessment:     ICD-10-CM    1. Callus of foot  L84 XR Foot Right G/E 3 Views      2. Capsulitis of metatarsophalangeal (MTP) joint of right foot  M77.51 Ankle/Foot Bracing Supplies Order Foot Insert; Right          Plan:    I have explained to Paulette about the progress of the conditions.  At this time, the patient was educated on the importance of offloading supportive shoes and other devices.  I demonstrated to the patient calf stretches to perform every hour daily until symptoms resolve.  After symptoms resolve, the patient was advised to perform the stretches 3 times daily to prevent future recurrence.  The patient was instructed to perform warm soaks with Epson salt after which to also apply over-the-counter Voltaren gel to deeply massage the injured tissue.  The patient was instructed to do this on a daily basis until symptoms resolve.   The patient was fitted with a Dynaflex insert that will aid in offloading the tension forces to the soft tissues and prevent further " inflammation.   The patient may return in four weeks for reevaluation to determine if any further treatment will be needed.      Paulette verbalized agreement with and understanding of the rational for the diagnosis and treatment plan.  All questions were answered to best of my ability and the patient's satisfaction. The patient was advised to contact the clinic with any questions that may arise after the clinic visit.      Disclaimer: This note consists of symbols derived from keyboarding, dictation and/or voice recognition software. As a result, there may be errors in the script that have gone undetected. Please consider this when interpreting information found in this chart.       NATALIE Crook D.P.M., F.A.C.F.A.S.      Again, thank you for allowing me to participate in the care of your patient.        Sincerely,        Rafi Crook DPM

## 2024-07-29 ENCOUNTER — ANCILLARY PROCEDURE (OUTPATIENT)
Dept: MAMMOGRAPHY | Facility: CLINIC | Age: 59
End: 2024-07-29
Attending: PHYSICIAN ASSISTANT
Payer: COMMERCIAL

## 2024-07-29 DIAGNOSIS — Z12.31 VISIT FOR SCREENING MAMMOGRAM: ICD-10-CM

## 2024-07-29 PROCEDURE — 77063 BREAST TOMOSYNTHESIS BI: CPT | Mod: TC | Performed by: RADIOLOGY

## 2024-07-29 PROCEDURE — 77067 SCR MAMMO BI INCL CAD: CPT | Mod: TC | Performed by: RADIOLOGY

## 2024-07-31 NOTE — PROGRESS NOTES
History of Present Illness - Paulette Flores is a very pleasant 59 year old female here to see me in follow up from 4/15/24, and was seen for the first time for dizziness and also cerumen obstruction.    She describes her symptoms as an intermittent sensation of spinning or floating. She also sometimes has a feeling of fogginess or of being in a tunnel after turning. Symptoms typically last for a few seconds but have lasted up to a day. The patient reports that she has been treated for benign paroxysmal positional vertigo (BPPV) before, but recent testing for BPPV was negative. The patient also reports that she has not had a lot of true vertigo recently. The patient reports that rolling over in bed makes symptoms worse, and she sometimes wakes up due to symptoms, which are typically a floating sensation. Movement makes symptoms worse, and sitting still helps. The patient also notes increased symptoms with changes in weather and temperature, and symptoms are worse in the spring and fall.     She recently had a visit on 4/5/24 for VNG testing. The only peripheral finding was a very mild 8% hypofunction on the RIGHT.  Otherwise assessment as follows:  1. Indications of central vestibular system involvement noted on today's exam were as follows:   - Mild spontaneous up beating nystagmus, intermittently mixed with left beating nystagmus, was evident throughout much of testing  - Mildly saccadic pursuit     2. There were no indications of peripheral vestibular system involvement noted on today's exam.     She tells me that she has had recurrent vertigo and has had many visits to the PT for canalith maneuvers.  She has always had this in the LEFT ear, and the PT worked.  But then the RIGHT side seems to be having issues.  She is Vestibular rehab again as well.  But then one morning she woke up feeling very light headed.  She had no other symptoms such as headache, double vision, nausea, or headache.      The only pattern  "she has noticed with this \"new\" type of dizziness is that with big weather changes or barometric shifts, she gets more symptoms.  Also, in general her vertigo seems to be primarily in the fall or spring.      Past Medical History -   Patient Active Problem List   Diagnosis    Conductive hearing loss in left ear    DDD (degenerative disc disease), lumbar    Decreased GFR    Chronic midline thoracic back pain    Family history of hypothyroidism    Left knee pain    Benign paroxysmal positional vertigo, right    Unsteadiness on feet       Current Medications -   Current Outpatient Medications:     albuterol (PROAIR HFA/PROVENTIL HFA/VENTOLIN HFA) 108 (90 Base) MCG/ACT inhaler, Inhale 1-2 puffs into the lungs every 4 hours as needed (Patient not taking: Reported on 2/20/2024), Disp: , Rfl:     benzonatate (TESSALON) 100 MG capsule, Take 100 mg by mouth 3 times daily as needed (Patient not taking: Reported on 2/20/2024), Disp: , Rfl:     predniSONE (DELTASONE) 20 MG tablet, Take 2 tabs daily x 3 days, then 1 tab daily x 3 days, then 1/2 tab daily x4 days. (Patient not taking: Reported on 7/12/2024), Disp: 11 tablet, Rfl: 0    Allergies -   Allergies   Allergen Reactions    Percocet [Oxycodone-Acetaminophen] Difficulty breathing     Throat closed    Amoxicillin-Pot Clavulanate      \"stomach issues\"    Vicodin [Hydrocodone-Acetaminophen] Nausea and Vomiting       Social History -   Social History     Socioeconomic History    Marital status:    Tobacco Use    Smoking status: Light Smoker     Packs/day: 0.20     Years: 21.00     Additional pack years: 0.00     Total pack years: 4.20     Types: Cigarettes    Smokeless tobacco: Never    Tobacco comments:     trying to quit/cut down   Vaping Use    Vaping Use: Never used   Substance and Sexual Activity    Alcohol use: No     Alcohol/week: 0.0 standard drinks of alcohol    Drug use: No    Sexual activity: Yes     Partners: Male   Social History Narrative    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28    Grandchildren     Social Determinants of Health     Interpersonal Safety: Low Risk  (2/20/2024)    Interpersonal Safety     Do you feel physically and emotionally safe where you currently live?: Yes     Within the past 12 months, have you been hit, slapped, kicked or otherwise physically hurt by someone?: No     Within the past 12 months, have you been humiliated or emotionally abused in other ways by your partner or ex-partner?: No       Family History -   Family History   Problem Relation Age of Onset    Hypertension Mother     Hypothyroidism Mother     Hypertension Father     Graves' disease Sister     Hypothyroidism Sister     Coronary Artery Disease Maternal Grandmother         MI    Hypertension Maternal Grandmother     Cerebrovascular Disease Maternal Grandfather     Diabetes Maternal Grandfather     Asthma Paternal Grandmother     Cerebrovascular Disease Paternal Grandfather     Breast Cancer No family hx of     Colon Cancer No family hx of        Review of Systems - As per HPI and PMHx, otherwise 10+ system review of the head and neck, and general constitution is negative.    Physical Exam  LMP  (LMP Unknown)       General - The patient is well nourished and well developed, and appears to have good nutritional status.  Alert and oriented to person and place, answers questions and cooperates with examination appropriately.   Head and Face - Normocephalic and atraumatic, with no gross asymmetry noted of the contour of the facial features.  The facial nerve is intact, with strong symmetric movements.  Voice and Breathing - The patient was breathing comfortably without the use of accessory muscles. There was no wheezing, stridor, or stertor.  The patients voice was clear and strong, and had appropriate pitch and quality.    Cerumen Removal    Physical Exam and Procedure  Ears - On examination of the ears, I found that the BOTH sides had cerumen impaction.   Therefore, I positioned them in the examination chair in a semi-supine position, beginning with the right side.  I used the binocular surgical microscope to perform cerumen removal.  I began by using a cerumen loop to gently lift the edges of the cerumen mass away from the walls of the external canal.  Once I did this, I was able to suction away fragments of wax and debris using suction.  Once the mass was loose enough, the entire plug was pulled from the canal.  The tympanic membrane was intact, no sign of perforation or middle ear effusion.    I turned my attention to the contralateral side once again using the binocular surgical microscope to perform cerumen removal.  I began by using a cerumen loop to gently lift the edges of the cerumen mass away from the walls of the external canal.  Once I did this, I was able to suction away fragments of wax and debris using suction.  Once the mass was loose enough, the entire plug was pulled from the canal.  The tympanic membrane was intact, no sign of perforation or middle ear effusion.        A/P - Paulette Flores is a 59 year old female  (H81.12) Benign paroxysmal positional vertigo of left ear  (R42) Dizziness    The patient has had their cerumen procedurally removed today.  I have discussed ear care at home, including avoiding qtips or any other object placed in the canal.  I have also discussed that over the counter cerumen kits like Debrox or Cerumenex can be useful.    If no other issues, follow up with me in one year for an ear check.

## 2024-08-08 ENCOUNTER — OFFICE VISIT (OUTPATIENT)
Dept: OTOLARYNGOLOGY | Facility: CLINIC | Age: 59
End: 2024-08-08
Payer: COMMERCIAL

## 2024-08-08 VITALS — HEART RATE: 74 BPM | OXYGEN SATURATION: 99 % | SYSTOLIC BLOOD PRESSURE: 123 MMHG | DIASTOLIC BLOOD PRESSURE: 78 MMHG

## 2024-08-08 DIAGNOSIS — H61.23 BILATERAL IMPACTED CERUMEN: Primary | ICD-10-CM

## 2024-08-08 DIAGNOSIS — F17.200 NICOTINE DEPENDENCE, UNCOMPLICATED, UNSPECIFIED NICOTINE PRODUCT TYPE: ICD-10-CM

## 2024-08-08 PROCEDURE — 69210 REMOVE IMPACTED EAR WAX UNI: CPT | Performed by: OTOLARYNGOLOGY

## 2024-08-08 NOTE — LETTER
8/8/2024      Paulette Flores  [address]  [place] [address]      Dear Colleague,    Thank you for referring your patient, Paulette Flores, to the [facility]. Please see a copy of my visit note below.    History of Present Illness - Paulette Flores is a very pleasant 59 year old female here to see me in follow up from 4/15/24, and was seen for the first time for dizziness and also cerumen obstruction.    She describes her symptoms as an intermittent sensation of spinning or floating. She also sometimes has a feeling of fogginess or of being in a tunnel after turning. Symptoms typically last for a few seconds but have lasted up to a day. The patient reports that she has been treated for benign paroxysmal positional vertigo (BPPV) before, but recent testing for BPPV was negative. The patient also reports that she has not had a lot of true vertigo recently. The patient reports that rolling over in bed makes symptoms worse, and she sometimes wakes up due to symptoms, which are typically a floating sensation. Movement makes symptoms worse, and sitting still helps. The patient also notes increased symptoms with changes in weather and temperature, and symptoms are worse in the spring and fall.     She recently had a visit on 4/5/24 for VNG testing. The only peripheral finding was a very mild 8% hypofunction on the RIGHT.  Otherwise assessment as follows:  1. Indications of central vestibular system involvement noted on today's exam were as follows:   - Mild spontaneous up beating nystagmus, intermittently mixed with left beating nystagmus, was evident throughout much of testing  - Mildly saccadic pursuit     2. There were no indications of peripheral vestibular system involvement noted on today's exam.     She tells me that she has had recurrent vertigo and has had many visits to the PT for canalith maneuvers.  She has always had this in the LEFT ear, and the PT worked.  But then the RIGHT side  "seems to be having issues.  She is Vestibular rehab again as well.  But then one morning she woke up feeling very light headed.  She had no other symptoms such as headache, double vision, nausea, or headache.      The only pattern she has noticed with this \"new\" type of dizziness is that with big weather changes or barometric shifts, she gets more symptoms.  Also, in general her vertigo seems to be primarily in the fall or spring.      Past Medical History -   Patient Active Problem List   Diagnosis     Conductive hearing loss in left ear     DDD (degenerative disc disease), lumbar     Decreased GFR     Chronic midline thoracic back pain     Family history of hypothyroidism     Left knee pain     Benign paroxysmal positional vertigo, right     Unsteadiness on feet       Current Medications -   Current Outpatient Medications:      albuterol (PROAIR HFA/PROVENTIL HFA/VENTOLIN HFA) 108 (90 Base) MCG/ACT inhaler, Inhale 1-2 puffs into the lungs every 4 hours as needed (Patient not taking: Reported on 2/20/2024), Disp: , Rfl:      benzonatate (TESSALON) 100 MG capsule, Take 100 mg by mouth 3 times daily as needed (Patient not taking: Reported on 2/20/2024), Disp: , Rfl:      predniSONE (DELTASONE) 20 MG tablet, Take 2 tabs daily x 3 days, then 1 tab daily x 3 days, then 1/2 tab daily x4 days. (Patient not taking: Reported on 7/12/2024), Disp: 11 tablet, Rfl: 0    Allergies -   Allergies   Allergen Reactions     Percocet [Oxycodone-Acetaminophen] Difficulty breathing     Throat closed     Amoxicillin-Pot Clavulanate      \"stomach issues\"     Vicodin [Hydrocodone-Acetaminophen] Nausea and Vomiting       Social History -   Social History     Socioeconomic History     Marital status:    Tobacco Use     Smoking status: Light Smoker     Packs/day: 0.20     Years: 21.00     Additional pack years: 0.00     Total pack years: 4.20     Types: Cigarettes     Smokeless tobacco: Never     Tobacco comments:     trying to " quit/cut down   Vaping Use     Vaping Use: Never used   Substance and Sexual Activity     Alcohol use: No     Alcohol/week: 0.0 standard drinks of alcohol     Drug use: No     Sexual activity: Yes     Partners: Male   Social History Narrative    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     Social Determinants of Health     Interpersonal Safety: Low Risk  (2/20/2024)    Interpersonal Safety      Do you feel physically and emotionally safe where you currently live?: Yes      Within the past 12 months, have you been hit, slapped, kicked or otherwise physically hurt by someone?: No      Within the past 12 months, have you been humiliated or emotionally abused in other ways by your partner or ex-partner?: No       Family History -   Family History   Problem Relation Age of Onset     Hypertension Mother      Hypothyroidism Mother      Hypertension Father      Graves' disease Sister      Hypothyroidism Sister      Coronary Artery Disease Maternal Grandmother         MI     Hypertension Maternal Grandmother      Cerebrovascular Disease Maternal Grandfather      Diabetes Maternal Grandfather      Asthma Paternal Grandmother      Cerebrovascular Disease Paternal Grandfather      Breast Cancer No family hx of      Colon Cancer No family hx of        Review of Systems - As per HPI and PMHx, otherwise 10+ system review of the head and neck, and general constitution is negative.    Physical Exam  LMP  (LMP Unknown)       General - The patient is well nourished and well developed, and appears to have good nutritional status.  Alert and oriented to person and place, answers questions and cooperates with examination appropriately.   Head and Face - Normocephalic and atraumatic, with no gross asymmetry noted of the contour of the facial features.  The facial nerve is intact, with strong symmetric movements.  Voice and Breathing - The patient was breathing comfortably without the use of accessory muscles.  There was no wheezing, stridor, or stertor.  The patients voice was clear and strong, and had appropriate pitch and quality.    Cerumen Removal    Physical Exam and Procedure  Ears - On examination of the ears, I found that the BOTH sides had cerumen impaction.  Therefore, I positioned them in the examination chair in a semi-supine position, beginning with the right side.  I used the binocular surgical microscope to perform cerumen removal.  I began by using a cerumen loop to gently lift the edges of the cerumen mass away from the walls of the external canal.  Once I did this, I was able to suction away fragments of wax and debris using suction.  Once the mass was loose enough, the entire plug was pulled from the canal.  The tympanic membrane was intact, no sign of perforation or middle ear effusion.    I turned my attention to the contralateral side once again using the binocular surgical microscope to perform cerumen removal.  I began by using a cerumen loop to gently lift the edges of the cerumen mass away from the walls of the external canal.  Once I did this, I was able to suction away fragments of wax and debris using suction.  Once the mass was loose enough, the entire plug was pulled from the canal.  The tympanic membrane was intact, no sign of perforation or middle ear effusion.        A/P - Paulette Flores is a 59 year old female  (H81.12) Benign paroxysmal positional vertigo of left ear  (R42) Dizziness    The patient has had their cerumen procedurally removed today.  I have discussed ear care at home, including avoiding qtips or any other object placed in the canal.  I have also discussed that over the counter cerumen kits like Debrox or Cerumenex can be useful.    If no other issues, follow up with me in one year for an ear check.        Again, thank you for allowing me to participate in the care of your patient.        Sincerely,        Bandar Mena MD

## 2024-08-31 ENCOUNTER — HEALTH MAINTENANCE LETTER (OUTPATIENT)
Age: 59
End: 2024-08-31

## 2024-09-10 ENCOUNTER — OFFICE VISIT (OUTPATIENT)
Dept: FAMILY MEDICINE | Facility: CLINIC | Age: 59
End: 2024-09-10
Payer: COMMERCIAL

## 2024-09-10 VITALS
WEIGHT: 219 LBS | HEART RATE: 63 BPM | BODY MASS INDEX: 38.8 KG/M2 | HEIGHT: 63 IN | DIASTOLIC BLOOD PRESSURE: 78 MMHG | OXYGEN SATURATION: 98 % | RESPIRATION RATE: 16 BRPM | TEMPERATURE: 97.1 F | SYSTOLIC BLOOD PRESSURE: 128 MMHG

## 2024-09-10 DIAGNOSIS — Z00.00 ENCOUNTER FOR ROUTINE ADULT HEALTH EXAMINATION WITHOUT ABNORMAL FINDINGS: Primary | ICD-10-CM

## 2024-09-10 DIAGNOSIS — Z83.49 FAMILY HISTORY OF HYPOTHYROIDISM: ICD-10-CM

## 2024-09-10 LAB
CHOLEST SERPL-MCNC: 211 MG/DL
CREAT SERPL-MCNC: 1.12 MG/DL (ref 0.51–0.95)
EGFRCR SERPLBLD CKD-EPI 2021: 56 ML/MIN/1.73M2
FASTING STATUS PATIENT QL REPORTED: YES
FASTING STATUS PATIENT QL REPORTED: YES
GLUCOSE SERPL-MCNC: 96 MG/DL (ref 70–99)
HDLC SERPL-MCNC: 58 MG/DL
HGB BLD-MCNC: 14.5 G/DL (ref 11.7–15.7)
LDLC SERPL CALC-MCNC: 134 MG/DL
NONHDLC SERPL-MCNC: 153 MG/DL
TRIGL SERPL-MCNC: 96 MG/DL
TSH SERPL DL<=0.005 MIU/L-ACNC: 2.06 UIU/ML (ref 0.3–4.2)

## 2024-09-10 PROCEDURE — 82565 ASSAY OF CREATININE: CPT | Performed by: PHYSICIAN ASSISTANT

## 2024-09-10 PROCEDURE — 36415 COLL VENOUS BLD VENIPUNCTURE: CPT | Performed by: PHYSICIAN ASSISTANT

## 2024-09-10 PROCEDURE — 99396 PREV VISIT EST AGE 40-64: CPT | Performed by: PHYSICIAN ASSISTANT

## 2024-09-10 PROCEDURE — 80061 LIPID PANEL: CPT | Performed by: PHYSICIAN ASSISTANT

## 2024-09-10 PROCEDURE — 82947 ASSAY GLUCOSE BLOOD QUANT: CPT | Performed by: PHYSICIAN ASSISTANT

## 2024-09-10 PROCEDURE — 84443 ASSAY THYROID STIM HORMONE: CPT | Performed by: PHYSICIAN ASSISTANT

## 2024-09-10 PROCEDURE — 85018 HEMOGLOBIN: CPT | Performed by: PHYSICIAN ASSISTANT

## 2024-12-23 ENCOUNTER — OFFICE VISIT (OUTPATIENT)
Dept: URGENT CARE | Facility: URGENT CARE | Age: 59
End: 2024-12-23
Payer: COMMERCIAL

## 2024-12-23 VITALS
TEMPERATURE: 98.4 F | SYSTOLIC BLOOD PRESSURE: 146 MMHG | DIASTOLIC BLOOD PRESSURE: 82 MMHG | RESPIRATION RATE: 28 BRPM | HEART RATE: 78 BPM | WEIGHT: 224.2 LBS | OXYGEN SATURATION: 95 % | BODY MASS INDEX: 40.35 KG/M2

## 2024-12-23 DIAGNOSIS — R05.1 ACUTE COUGH: ICD-10-CM

## 2024-12-23 DIAGNOSIS — J01.01 ACUTE RECURRENT MAXILLARY SINUSITIS: Primary | ICD-10-CM

## 2024-12-23 PROCEDURE — 99213 OFFICE O/P EST LOW 20 MIN: CPT

## 2024-12-23 RX ORDER — DOXYCYCLINE 100 MG/1
100 CAPSULE ORAL 2 TIMES DAILY
Qty: 14 CAPSULE | Refills: 0 | Status: SHIPPED | OUTPATIENT
Start: 2024-12-23 | End: 2024-12-30

## 2024-12-24 NOTE — PROGRESS NOTES
Patient presents with:  URI: Pressure in face/teeth  Cough started last week- keeps getting worse      Clinical Decision Making:      ICD-10-CM    1. Acute recurrent maxillary sinusitis  J01.01 doxycycline hyclate (VIBRAMYCIN) 100 MG capsule      2. Acute cough  R05.1         Patient with acute recurrent maxillary sinusitis, will treat with doxycycline.  Also recommend Mucinex, nasal spray, and Tylenol as needed for symptom relief. Patient instructions as below. Discussed red flag symptoms for when to return.       Patient Instructions   1.  Take antibiotic according to bottle instructions with food to avoid stomach upset.  If you are prone to stomach upset with antibiotics, I recommend adding a probiotic to this regimen.  Culturelle is a trusted brand.  2.  I recommend using Mucinex to help thin mucus secretions.  Adding a saline nasal spray can also be helpful with clearing sinus congestion.  3.  Take Advil or Tylenol as needed for pain or fever control.  4.  Follow-up if you not having any improvement in your symptoms over the next 5 days.     HPI:  Paulette Flores is a 59 year old female who presents today with concerns of cough and facial pressure. Symptoms have been present for a little over a week and have seemed to get worse. Started with a cough but now she is getting facial pressure which feels similar to what she has gotten in the past with sinus infections. Cough is waking her up at night. Cough is dry. No chest congestion. No fevers. No chest pain or shortness of breath.     History obtained from the patient.    Problem List:  2024-02: Unsteadiness on feet  2024-02: Benign paroxysmal positional vertigo, right  2023-03: Left knee pain  2022-08: Hip pain, left  2022-07: Family history of hypothyroidism  2018-10: Chronic midline thoracic back pain  2017-08: Cervical cancer screening  2017-08: Decreased GFR  2016-05: Hip pain, left  2016-02: Chronic left-sided low back pain without sciatica  2016-02:  Sacroiliac joint dysfunction of right side  2016-02: DDD (degenerative disc disease), lumbar  2015-03: CARDIOVASCULAR SCREENING; LDL GOAL LESS THAN 160  2012-03: Conductive hearing loss in left ear      No past medical history on file.    Social History     Tobacco Use    Smoking status: Light Smoker     Current packs/day: 0.20     Average packs/day: 0.2 packs/day for 21.0 years (4.2 ttl pk-yrs)     Types: Cigarettes    Smokeless tobacco: Never    Tobacco comments:     trying to quit/cut down   Substance Use Topics    Alcohol use: No     Alcohol/week: 0.0 standard drinks of alcohol       ROS is negative other than what is noted in HPI.       Vitals:    12/23/24 1801   BP: (!) 146/82   Pulse: 78   Resp: 28   Temp: 98.4  F (36.9  C)   TempSrc: Oral   SpO2: 95%   Weight: 101.7 kg (224 lb 3.2 oz)       Physical Exam  Constitutional:       General: She is not in acute distress.     Appearance: She is not diaphoretic.   HENT:      Head: Normocephalic and atraumatic.      Right Ear: Tympanic membrane and external ear normal.      Left Ear: Tympanic membrane and external ear normal.      Mouth/Throat:      Pharynx: No oropharyngeal exudate or posterior oropharyngeal erythema.   Eyes:      Conjunctiva/sclera: Conjunctivae normal.   Cardiovascular:      Rate and Rhythm: Normal rate and regular rhythm.      Heart sounds: Normal heart sounds. No murmur heard.  Pulmonary:      Effort: Pulmonary effort is normal. No respiratory distress.      Breath sounds: Normal breath sounds.   Abdominal:      Palpations: Abdomen is soft.      Tenderness: There is no abdominal tenderness.   Musculoskeletal:         General: Normal range of motion.   Skin:     General: Skin is warm.      Capillary Refill: Capillary refill takes less than 2 seconds.   Neurological:      General: No focal deficit present.      Mental Status: She is alert.   Psychiatric:         Mood and Affect: Mood normal.         Thought Content: Thought content normal.          Judgment: Judgment normal.           At the end of the encounter, I discussed results, diagnosis, medications. Discussed red flags for immediate return to clinic/ER, as well as indications for follow up if no improvement. Patient understood and agreed to plan. Patient was stable for discharge.    MOSES Diaz [facility]    Patient/Family

## 2024-12-24 NOTE — PATIENT INSTRUCTIONS
1.  Take antibiotic according to bottle instructions with food to avoid stomach upset.  If you are prone to stomach upset with antibiotics, I recommend adding a probiotic to this regimen.  Culturelle is a trusted brand.  2.  I recommend using Mucinex to help thin mucus secretions.  Adding a saline nasal spray can also be helpful with clearing sinus congestion.  3.  Take Advil or Tylenol as needed for pain or fever control.  4.  Follow-up if you not having any improvement in your symptoms over the next 5 days.

## 2025-02-03 ENCOUNTER — MYC MEDICAL ADVICE (OUTPATIENT)
Dept: OTOLARYNGOLOGY | Facility: CLINIC | Age: 60
End: 2025-02-03
Payer: COMMERCIAL

## 2025-02-03 DIAGNOSIS — R42 DIZZINESS: Primary | ICD-10-CM

## 2025-02-04 RX ORDER — TRIAMTERENE AND HYDROCHLOROTHIAZIDE 37.5; 25 MG/1; MG/1
0.5 TABLET ORAL DAILY
Qty: 45 TABLET | Refills: 2 | Status: SHIPPED | OUTPATIENT
Start: 2025-02-04 | End: 2025-05-05

## 2025-02-20 ENCOUNTER — MYC MEDICAL ADVICE (OUTPATIENT)
Dept: OTOLARYNGOLOGY | Facility: CLINIC | Age: 60
End: 2025-02-20
Payer: COMMERCIAL

## 2025-02-20 DIAGNOSIS — R42 DIZZINESS: ICD-10-CM

## 2025-02-20 RX ORDER — TRIAMTERENE AND HYDROCHLOROTHIAZIDE 37.5; 25 MG/1; MG/1
1 TABLET ORAL DAILY
Qty: 90 TABLET | Refills: 3 | Status: SHIPPED | OUTPATIENT
Start: 2025-02-20

## 2025-05-26 ENCOUNTER — PATIENT OUTREACH (OUTPATIENT)
Dept: CARE COORDINATION | Facility: CLINIC | Age: 60
End: 2025-05-26
Payer: COMMERCIAL